# Patient Record
Sex: FEMALE | Race: BLACK OR AFRICAN AMERICAN | Employment: FULL TIME | URBAN - METROPOLITAN AREA
[De-identification: names, ages, dates, MRNs, and addresses within clinical notes are randomized per-mention and may not be internally consistent; named-entity substitution may affect disease eponyms.]

---

## 2017-01-02 ENCOUNTER — THERAPY VISIT (OUTPATIENT)
Dept: OCCUPATIONAL THERAPY | Facility: CLINIC | Age: 57
End: 2017-01-02
Payer: COMMERCIAL

## 2017-01-02 DIAGNOSIS — M65.90 SYNOVITIS AND TENOSYNOVITIS: ICD-10-CM

## 2017-01-02 DIAGNOSIS — M65.4 RADIAL STYLOID TENOSYNOVITIS: ICD-10-CM

## 2017-01-02 DIAGNOSIS — M25.542 PAIN IN THUMB JOINT WITH MOVEMENT OF LEFT HAND: Primary | ICD-10-CM

## 2017-01-02 PROCEDURE — 97140 MANUAL THERAPY 1/> REGIONS: CPT | Mod: GO | Performed by: OCCUPATIONAL THERAPIST

## 2017-01-02 PROCEDURE — 97165 OT EVAL LOW COMPLEX 30 MIN: CPT | Mod: GO | Performed by: OCCUPATIONAL THERAPIST

## 2017-01-02 PROCEDURE — 97110 THERAPEUTIC EXERCISES: CPT | Mod: GO | Performed by: OCCUPATIONAL THERAPIST

## 2017-01-02 NOTE — PROGRESS NOTES
Hand Therapy Initial Evaluation    Current Date:  1/2/2017    Diagnosis: L thumb DeQuervain's tendonitis  DOI: 12/28/16 MD order, started  Approximately 5 weeks 11/28/16    Subjective:  Roxie Vargas is a 56 year old right  hand dominant female.    Patient reports symptoms of pain of the left 1st  DC  which occurred due to unknown reasons . Since onset symptoms are Unchanged  Special tests: none .  Previous treatment: off shelf thumb spica (has been wearing 3 weeks now. Ibuprofen  General health as reported by patient is good. Pertinent medical history includes:auto immune hepatitis. Medical allergies:aspirin shrimp. Surgical history: none. Medication history: none.    Current occupation is   Currently working in normal job without restrictions  Job Tasks: computer work  Barriers include: none  Prior functional level: no limitations    Additional Occupational Profile Information (patterns of daily living, interests, values and needs): putting coat applying lotion to body, carry plate, putting seat belt on carry milk jug     Upper Extremity Functional Index Score:  SCORE:   Column Totals: /80: 66   (A lower score indicates greater disability.)    Objective:  Pain Level Report  VAS(0-10) 1/2/2017   At Rest: 0   With Use: 9   Report of Pain:  Location: L  wrist and thumb 1st DC   Pain Quality: Sharp  Frequency: intermittent    Pain is worst:  daytime  Exacerbated by: Use, Ibuprofen  Relieved by:  rest  Progression: Unchanged   ROM:Wrist UD measured with full composite thumb flexion  1/2/2017 Date: 1/2/2017 1/2/2017   Right  Side: Left    5 Wrist UD with Composite Thumb Flexion +10       Pain level report on scale of 0-10/10  Resisted Testing 1/2/2017   APL +9   EPB +9   RD +6   UD -   Wrist Ext -   Wrist Flex -     Strength:   (Measured in pounds)  Pain Report:  - none    + mild    ++ moderate    +++ severe     1/2/2017   Trials Right  Left    1  2  3 70  70  70 35  45  30   Average: 70 35      Lat Pinch  1/2/2017   Trials Right  Left    1  2  3 19  19  18 11  12  13   Average: 19 12     3 Pt Pinch  1/2/2017   Trials Right  Left    1  2  3 13  12  14 8  10  10   Average: 13 9     Pain Report:  - none    + mild    ++ moderate    +++ severe   Tenderness 1/2/2017   Radial Styloid +   SBRN -     Special Tests 1/2/2017   Finkelsteins R:  L:   Radial Nerve Tinel's -     Assessment:  Patient presents with symptoms consistent with diagnosis of DeQuervains tenosynovitis, with conservative intervention.   Patient's limitations or Problem List includes:  Pain, Decreased ROM/motion, Weakness, Decreased  and pinch strength and tightness in musculature of the left wrist and thumb which interferes with the patient's ability to perform Self Care Tasks (hygiene/washing hair ), Work Tasks, Household Chores and Driving  as compared to previous level of function.    Rehab Potential:  Excellent - Return to full activity, no limitations    Patient will benefit from skilled Occupational Therapy to increase wrist and thumb ROM, flexibility,  strength and pinch strength and decrease pain to return to previous activity level and resume normal daily tasks and to reach their rehab potential.    Barriers to Learning:  No barrier    Communication Issues:  Patient appears to be able to clearly communicate and understand verbal and written communication and follow directions correctly.    Assessment of Occupational Performance:  1-3 Performance Deficits  Identified Performance Deficits: bathing, household chores , driving and work      Clinical Decision Making (Complexity): Low complexity    Treatment Explanation:  The following has been discussed with the patient:    RX ordered/plan of care  Anticipated outcomes  Possible risks and side effects    Frequency:  1 X week, once daily  Duration:  for 4 weeks then has return appointment scheduled with Dr. Reno     Treatment Plan:    Modalities:    US   Therapeutic Exercise:    AROM of wrist and thumb  PROM with stretch to wrist and thumb extensors   Manual Techniques:   Re-alignment of the wrist in relation to radius (glide radially)  Interosseous membrane glide  Friction massage over 1st dorsal compartment  Myofascial release of the thumb extensors and flexors  Orthotic Fabrication:  Forearm based thumb spica orthosis  Neuromuscular: K tape     Home Program:   Exercise:   AROM of wrist and thumb, PROM with stretch into simultaneous thumb flexion and ulnar deviation  Gentle pain free wrist flex ext and RD, UD periodically during the day with brace off.   Orthosis:  Small off shelf for day and large off shelf for night   Forearm based thumb spica orthosis during the day and at night if needed. Will remove for exercise and hygiene.  Activity:   Avoid activities that exacerbate pain in the wrist or thumb.    Avoid  with twist, wide grasp.  Keep Thumb straight   Manual              FM and MFR 1st DC with lotion in am    Discharge Plan:    Achieve all LTG.  Independent in home treatment program.  Reach maximal therapeutic benefit.    Home Exercise Program:  FM and MFR 1st DC   Avoid using thumb in extension  Tendon gliding with PROM thumb flexion and wrist UD   Gentle pain free wrist flexion and extension     Next Visit:  Patient will bring work station pictures for activity review.   IF time allows introduce US K tape

## 2017-01-09 ENCOUNTER — THERAPY VISIT (OUTPATIENT)
Dept: OCCUPATIONAL THERAPY | Facility: CLINIC | Age: 57
End: 2017-01-09
Payer: COMMERCIAL

## 2017-01-09 DIAGNOSIS — M65.4 RADIAL STYLOID TENOSYNOVITIS: ICD-10-CM

## 2017-01-09 DIAGNOSIS — M25.542 PAIN IN THUMB JOINT WITH MOVEMENT OF LEFT HAND: Primary | ICD-10-CM

## 2017-01-09 DIAGNOSIS — M65.90 SYNOVITIS AND TENOSYNOVITIS: ICD-10-CM

## 2017-01-09 PROCEDURE — 97535 SELF CARE MNGMENT TRAINING: CPT | Mod: GO | Performed by: OCCUPATIONAL THERAPIST

## 2017-01-09 PROCEDURE — 97035 APP MDLTY 1+ULTRASOUND EA 15: CPT | Mod: GO | Performed by: OCCUPATIONAL THERAPIST

## 2017-01-09 PROCEDURE — 97140 MANUAL THERAPY 1/> REGIONS: CPT | Mod: GO | Performed by: OCCUPATIONAL THERAPIST

## 2017-01-09 NOTE — PROGRESS NOTES
SOAP note objective information for 1/9/2017.    Diagnosis: L thumb DeQuervain's tendonitis  DOI: 12/28/16 MD order, started  Approximately 5 weeks 11/28/16    Roxie Vargas is a 56 year old right  hand dominant female.Patient reports symptoms of pain of the left 1st DC which occurred due to unknown reasons. Since onset symptoms are Unchanged.  Special tests: none. Previous treatment: off shelf thumb spica (has been wearing 3 weeks now. Ibuprofen  General health as reported by patient is good. Pertinent medical history includes:auto immune hepatitis. Medical allergies:aspirin shrimp. Surgical history: none. Medication history: none.    Current occupation is   [employer].   Currently working in normal job without restrictions  Job Tasks: computer work    S:  Subjective changes as noted by patient: I brought my prefab night splint for you to see.   Functional changes noted by patient: Decreased Performance in pain, has had an increase in pain   Response to previous treatment: none   Patient has noted adverse reaction to: None      Objective:  Pain Level Report  VAS(0-10) 1/2/2017 1/9/17   At Rest: 0 3/10   With Use: 9 8/10   Report of Pain:  Location: L  wrist and thumb 1st DC   Pain Quality: Sharp  Frequency: intermittent    Pain is worst:  daytime  Exacerbated by: Use, Ibuprofen  Relieved by:  rest  Progression: Unchanged   ROM:Wrist UD measured with full composite thumb flexion  1/2/2017 Date: 1/2/2017 1/2/2017 1/9   Right  Side: Left  Left    5 Wrist UD with Composite Thumb Flexion +10 -10       Pain level report on scale of 0-10/10  Resisted Testing 1/2/2017   APL +9   EPB +9   RD +6   UD -   Wrist Ext -   Wrist Flex -     Strength:   (Measured in pounds)  Pain Report:  - none    + mild    ++ moderate    +++ severe     1/2/2017   Trials Right  Left    1  2  3 70  70  70 35  45  30   Average: 70 35     Lat Pinch  1/2/2017   Trials Right  Left    1  2  3 19  19  18 11  12  13    Average: 19 12     3 Pt Pinch  1/2/2017   Trials Right  Left    1  2  3 13  12  14 8  10  10   Average: 13 9     Pain Report:  - none    + mild    ++ moderate    +++ severe   Tenderness 1/2/2017   Radial Styloid +   SBRN -     Special Tests 1/2/2017   Finkelsteins R:  L:   Radial Nerve Tinel's -     A:  Response to therapy has been lack of progress in:  Pain:  intensity of pain has increased  Overall Assessment:  Patient would benefit from continued therapy to achieve rehab potential  STG/LTG:  See goal sheet for details and updates of remaining functional limitations.     Plan:  Frequency:  1 X week, once daily  Duration:  for 4 weeks then has return appointment scheduled with Dr. Reno       Treatment Plan:  Modalities:    US   Therapeutic Exercise:   AROM of wrist and thumb  PROM with stretch to wrist and thumb extensors   Manual Techniques:   Re-alignment of the wrist in relation to radius (glide radially)  Interosseous membrane glide  Friction massage over 1st dorsal compartment  Myofascial release of the thumb extensors and flexors  Orthotic Fabrication:  Forearm based thumb spica orthosis  Neuromuscular: K tape     Home Program:   Exercise:   AROM of wrist and thumb, PROM with stretch into simultaneous thumb flexion and ulnar deviation  Gentle pain free wrist flex ext and RD, UD periodically during the day with brace off.   Orthosis:  Small off shelf for day and large off shelf for night   Forearm based thumb spica orthosis during the day and at night if needed. Will remove for exercise and hygiene.  Activity:   Avoid activities that exacerbate pain in the wrist or thumb.    Avoid  with twist, wide grasp.  Keep Thumb straight   Manual              FM and MFR 1st DC with lotion in am    Home Exercise Program:  FM and MFR 1st DC   Avoid using thumb in extension  Tendon gliding with PROM thumb flexion and wrist UD   Small day time orthosis at work   Larger night time orthosis while sleeping   Gentle  pain free wrist flexion and extension   Work station, drop feet, get wrist rest, train self to rest between key strokes. Do not hold fingers up like flag poles between key strokes   Get arms level with keyboard, currently is on tray too low. Try getting keyboard up on desk next to monitor which seems more in line with elbows.     Next Visit:  Check implementation of work station recommendations.   US, MFR,tendon glide.  Start trial of K tape.

## 2017-01-16 ENCOUNTER — THERAPY VISIT (OUTPATIENT)
Dept: OCCUPATIONAL THERAPY | Facility: CLINIC | Age: 57
End: 2017-01-16
Payer: COMMERCIAL

## 2017-01-16 DIAGNOSIS — M65.4 RADIAL STYLOID TENOSYNOVITIS: ICD-10-CM

## 2017-01-16 DIAGNOSIS — M65.90 SYNOVITIS AND TENOSYNOVITIS: ICD-10-CM

## 2017-01-16 DIAGNOSIS — M25.542 PAIN IN THUMB JOINT WITH MOVEMENT OF LEFT HAND: Primary | ICD-10-CM

## 2017-01-16 PROCEDURE — 97035 APP MDLTY 1+ULTRASOUND EA 15: CPT | Mod: GO | Performed by: OCCUPATIONAL THERAPIST

## 2017-01-16 PROCEDURE — 97112 NEUROMUSCULAR REEDUCATION: CPT | Mod: GO | Performed by: OCCUPATIONAL THERAPIST

## 2017-01-16 PROCEDURE — 97140 MANUAL THERAPY 1/> REGIONS: CPT | Mod: GO | Performed by: OCCUPATIONAL THERAPIST

## 2017-01-16 NOTE — PROGRESS NOTES
SOAP note objective information for 1/16/2017.    Diagnosis: L thumb DeQuervain's tendonitis  DOI: 12/28/16 MD order, started  Approximately 5 weeks 11/28/16    Roxie Vargas is a 56 year old right  hand dominant female.Patient reports symptoms of pain of the left 1st DC which occurred due to unknown reasons. Since onset symptoms are Unchanged.  Special tests: none. Previous treatment: off shelf thumb spica (has been wearing 3 weeks now.),  Ibuprofen general health as reported by patient is good. Pertinent medical history includes:auto immune hepatitis. Medical allergies:aspirin shrimp. Surgical history: none. Medication history: none.    Current occupation is   [employer].   Currently working in normal job without restrictions  Job Tasks: computer work    O:  Pain Level Report  VAS(0-10) 1/2/2017 1/9/17 1/16/17   At Rest: 0 3/10 0/10   With Use: 9 8/10 7/10   Report of Pain:  Location: L  wrist and thumb 1st DC   Pain Quality: Sharp  Frequency: intermittent    Pain is worst:  daytime  Exacerbated by: Use, Ibuprofen  Relieved by:  rest  Progression: Unchanged   ROM:Wrist UD measured with full composite thumb flexion  1/2/2017 Date: 1/2/2017 1/2/2017 1/9 1/16   Right  Side: Left  Left     5 Wrist UD with Composite Thumb Flexion +10 -10 10       Pain level report on scale of 0-10/10  Resisted Testing 1/2/2017   APL +9   EPB +9   RD +6   UD -   Wrist Ext -   Wrist Flex -     Strength:   (Measured in pounds)  Pain Report:  - none    + mild    ++ moderate    +++ severe     1/2/2017   Trials Right  Left    1  2  3 70  70  70 35  45  30   Average: 70 35     Lat Pinch  1/2/2017   Trials Right  Left    1  2  3 19  19  18 11  12  13   Average: 19 12     3 Pt Pinch  1/2/2017   Trials Right  Left    1  2  3 13  12  14 8  10  10   Average: 13 9     Pain Report:  - none    + mild    ++ moderate    +++ severe   Tenderness 1/2/2017   Radial Styloid +   SBRN -     Special Tests 1/2/2017   Finkelsteins  R:  L:   Radial Nerve Tinel's -        Plan:  Frequency:  1 X week, once daily  Duration:  for 4 weeks then has return appointment scheduled with Dr. Reno       Treatment Plan:  Modalities:    US   Therapeutic Exercise:   AROM of wrist and thumb  PROM with stretch to wrist and thumb extensors   Manual Techniques:   Re-alignment of the wrist in relation to radius (glide radially)  Interosseous membrane glide  Friction massage over 1st dorsal compartment  Myofascial release of the thumb extensors and flexors  Orthotic Fabrication:  Forearm based thumb spica orthosis  Neuromuscular: K tape     Manual: 1st DC FM                Neuromuscular: Trial K tape    Home Exercise Program:  FM and MFR 1st DC   Avoid using thumb in extension  Tendon gliding with PROM thumb flexion and wrist UD   Small day time orthosis at work   Larger night time orthosis while sleeping   Gentle pain free wrist flexion and extension   Work station, drop feet, get wrist rest, train self to rest between key strokes. Do not hold fingers up like flag poles between key strokes   Get arms level with keyboard, currently is on tray too low. Try getting keyboard up on desk next to monitor which seems more in line with elbows. FM and MFR 1st DC with lotion in am  Self FM after shower     Next Visit:  Check response to K tape, implementation of work station recommendations.   US, MFR,tendon glide.

## 2017-03-06 DIAGNOSIS — D50.9 IRON DEFICIENCY ANEMIA, UNSPECIFIED IRON DEFICIENCY ANEMIA TYPE: Primary | ICD-10-CM

## 2017-06-19 DIAGNOSIS — K75.4 AUTOIMMUNE HEPATITIS (H): Primary | ICD-10-CM

## 2017-06-19 DIAGNOSIS — K75.4 AUTOIMMUNE HEPATITIS (H): ICD-10-CM

## 2017-06-19 LAB
ALBUMIN SERPL-MCNC: 3.6 G/DL (ref 3.4–5)
ALP SERPL-CCNC: 81 U/L (ref 40–150)
ALT SERPL W P-5'-P-CCNC: 16 U/L (ref 0–50)
ANION GAP SERPL CALCULATED.3IONS-SCNC: 6 MMOL/L (ref 3–14)
AST SERPL W P-5'-P-CCNC: 11 U/L (ref 0–45)
BILIRUB DIRECT SERPL-MCNC: <0.1 MG/DL (ref 0–0.2)
BILIRUB SERPL-MCNC: 0.3 MG/DL (ref 0.2–1.3)
BUN SERPL-MCNC: 11 MG/DL (ref 7–30)
CALCIUM SERPL-MCNC: 9.1 MG/DL (ref 8.5–10.1)
CHLORIDE SERPL-SCNC: 108 MMOL/L (ref 94–109)
CO2 SERPL-SCNC: 27 MMOL/L (ref 20–32)
CREAT SERPL-MCNC: 0.66 MG/DL (ref 0.52–1.04)
ERYTHROCYTE [DISTWIDTH] IN BLOOD BY AUTOMATED COUNT: 14.5 % (ref 10–15)
GFR SERPL CREATININE-BSD FRML MDRD: ABNORMAL ML/MIN/1.7M2
GLUCOSE SERPL-MCNC: 103 MG/DL (ref 70–99)
HCT VFR BLD AUTO: 36 % (ref 35–47)
HGB BLD-MCNC: 11.2 G/DL (ref 11.7–15.7)
INR PPP: 1.01 (ref 0.86–1.14)
MCH RBC QN AUTO: 28 PG (ref 26.5–33)
MCHC RBC AUTO-ENTMCNC: 31.1 G/DL (ref 31.5–36.5)
MCV RBC AUTO: 90 FL (ref 78–100)
PLATELET # BLD AUTO: 200 10E9/L (ref 150–450)
POTASSIUM SERPL-SCNC: 4.2 MMOL/L (ref 3.4–5.3)
PROT SERPL-MCNC: 7.9 G/DL (ref 6.8–8.8)
RBC # BLD AUTO: 4 10E12/L (ref 3.8–5.2)
SODIUM SERPL-SCNC: 141 MMOL/L (ref 133–144)
WBC # BLD AUTO: 6.3 10E9/L (ref 4–11)

## 2017-06-21 ENCOUNTER — OFFICE VISIT (OUTPATIENT)
Dept: GASTROENTEROLOGY | Facility: CLINIC | Age: 57
End: 2017-06-21
Attending: INTERNAL MEDICINE
Payer: COMMERCIAL

## 2017-06-21 VITALS
WEIGHT: 238.7 LBS | HEIGHT: 64 IN | RESPIRATION RATE: 16 BRPM | OXYGEN SATURATION: 99 % | HEART RATE: 88 BPM | BODY MASS INDEX: 40.75 KG/M2 | SYSTOLIC BLOOD PRESSURE: 119 MMHG | DIASTOLIC BLOOD PRESSURE: 81 MMHG | TEMPERATURE: 98 F

## 2017-06-21 DIAGNOSIS — K75.4 AUTOIMMUNE HEPATITIS (H): Primary | ICD-10-CM

## 2017-06-21 PROCEDURE — 99212 OFFICE O/P EST SF 10 MIN: CPT | Mod: ZF

## 2017-06-21 RX ORDER — CETIRIZINE HYDROCHLORIDE 10 MG/1
10 TABLET ORAL DAILY PRN
COMMUNITY
End: 2018-12-06

## 2017-06-21 ASSESSMENT — PAIN SCALES - GENERAL: PAINLEVEL: NO PAIN (0)

## 2017-06-21 NOTE — LETTER
6/21/2017      RE: Roxie Vagras  [id number] [facility] [id number]       CHIEF COMPLAINT:  Reason for the visit is autoimmune hepatitis.      HISTORY OF PRESENT ILLNESS:  Ms. Vargas is a 56-year-old [place] immigrant who I saw when she was admitted to the hospital on 12/2015 when she presented with very high liver function tests, fatigue and change urine color.  At that time, we did workup, and this included a liver biopsy.  Eventually, we found that she had chronic hepatitis which was consistent with autoimmune hepatitis.  She had some bridging fibrosis, and we started her on steroids and azathioprine.  The steroids were eventually discontinued, the azathioprine continued and she is tolerating it very good.  Her liver enzymes today were completely normal.  She does not have any signs of chronic liver disease including fluid retention, edema or abdominal distention.  Her weight has gone up which she attributes to her steroids, which she has discontinued since.  She has good appetite.  No nausea, vomiting or abdominal pain.  She is having good bowel movements.      Current Outpatient Prescriptions   Medication     cetirizine (ZYRTEC) 10 MG tablet     carboxymethylcellulose (CARBOXYMETHYLCELLULOSE SODIUM) 0.5 % SOLN ophthalmic solution     atovaquone-proguanil (MALARONE) 250-100 MG per tablet     azaTHIOprine (IMURAN) 50 MG tablet     Cholecalciferol (VITAMIN D PO)     ascorbic acid (VITAMIN C) 1000 MG TABS     ibuprofen (ADVIL,MOTRIN) 600 MG tablet     Ferrous Sulfate (IRON CR PO)     No current facility-administered medications for this visit.        PHYSICAL EXAMINATION:   VITAL SIGNS:  As of today, 06/21/2017, blood pressure is 119/81, temperature is 98 degrees Fahrenheit, pulse is 88, respirations 16, height is 1.626 meters, weight is 108.3 kg, BMI is 41.04.   HEENT:  Eyes are not icteric.  Mucosa moist.   NECK:  Supple, no lymphadenopathy.   CHEST:  Clear to auscultation.   ABDOMEN:  Not  distended, bowel sounds are present.  No hepatomegaly and no splenomegaly.   EXTREMITIES:  No edema, no clubbing.   CENTRAL NERVOUS SYSTEM:  Alert and oriented to place, person and time.  No asterixis noted.      IMPRESSION AND PLAN:  Autoimmune hepatitis.  Ms. Vargas has autoimmune hepatitis.  She is responding to the immunosuppression with azathioprine alone now.  She has achieved a clinical biochemical remission.  She will continue these, and we will talk with her about another liver biopsy to see if there is any regression of her fibrosis.  We did explain all that to her.  She will be coming here in 6 months, at which time if she continues to be having normal liver function tests then we will talk with her about the liver biopsy.  Otherwise, for her other medical issues, she will follow with her primary care physician.      This was a 25-minute visit of which more than 50% was spent in explaining to the patient what our plan of care was.  We answered all of her questions.      cc:   MD Wen Fuller MD

## 2017-06-21 NOTE — PROGRESS NOTES
CHIEF COMPLAINT:  Reason for the visit is autoimmune hepatitis.      HISTORY OF PRESENT ILLNESS:  Ms. Vargas is a 56-year-old [place] immigrant who I saw when she was admitted to the hospital on 12/2015 when she presented with very high liver function tests, fatigue and change urine color.  At that time, we did workup, and this included a liver biopsy.  Eventually, we found that she had chronic hepatitis which was consistent with autoimmune hepatitis.  She had some bridging fibrosis, and we started her on steroids and azathioprine.  The steroids were eventually discontinued, the azathioprine continued and she is tolerating it very good.  Her liver enzymes today were completely normal.  She does not have any signs of chronic liver disease including fluid retention, edema or abdominal distention.  Her weight has gone up which she attributes to her steroids, which she has discontinued since.  She has good appetite.  No nausea, vomiting or abdominal pain.  She is having good bowel movements.      Current Outpatient Prescriptions   Medication     cetirizine (ZYRTEC) 10 MG tablet     carboxymethylcellulose (CARBOXYMETHYLCELLULOSE SODIUM) 0.5 % SOLN ophthalmic solution     atovaquone-proguanil (MALARONE) 250-100 MG per tablet     azaTHIOprine (IMURAN) 50 MG tablet     Cholecalciferol (VITAMIN D PO)     ascorbic acid (VITAMIN C) 1000 MG TABS     ibuprofen (ADVIL,MOTRIN) 600 MG tablet     Ferrous Sulfate (IRON CR PO)     No current facility-administered medications for this visit.        PHYSICAL EXAMINATION:   VITAL SIGNS:  As of today, 06/21/2017, blood pressure is 119/81, temperature is 98 degrees Fahrenheit, pulse is 88, respirations 16, height is 1.626 meters, weight is 108.3 kg, BMI is 41.04.   HEENT:  Eyes are not icteric.  Mucosa moist.   NECK:  Supple, no lymphadenopathy.   CHEST:  Clear to auscultation.   ABDOMEN:  Not distended, bowel sounds are present.  No hepatomegaly and no splenomegaly.   EXTREMITIES:  No  edema, no clubbing.   CENTRAL NERVOUS SYSTEM:  Alert and oriented to place, person and time.  No asterixis noted.      IMPRESSION AND PLAN:  Autoimmune hepatitis.  Ms. Vargas has autoimmune hepatitis.  She is responding to the immunosuppression with azathioprine alone now.  She has achieved a clinical biochemical remission.  She will continue these, and we will talk with her about another liver biopsy to see if there is any regression of her fibrosis.  We did explain all that to her.  She will be coming here in 6 months, at which time if she continues to be having normal liver function tests then we will talk with her about the liver biopsy.  Otherwise, for her other medical issues, she will follow with her primary care physician.      This was a 25-minute visit of which more than 50% was spent in explaining to the patient what our plan of care was.  We answered all of her questions.      cc:   Tara Vargas MD

## 2017-06-21 NOTE — NURSING NOTE
"Chief Complaint   Patient presents with     RECHECK     autoimmune hepatitis       Initial /81 (BP Location: Left arm, Patient Position: Chair, Cuff Size: Adult Large)  Pulse 88  Temp 98  F (36.7  C) (Oral)  Resp 16  Ht 1.626 m (5' 4.02\")  Wt 108.3 kg (238 lb 11.2 oz)  SpO2 99%  BMI 40.95 kg/m2 Estimated body mass index is 40.95 kg/(m^2) as calculated from the following:    Height as of this encounter: 1.626 m (5' 4.02\").    Weight as of this encounter: 108.3 kg (238 lb 11.2 oz).  Medication Reconciliation: complete     [facility]  6/21/2017 11:01 AM        "

## 2017-06-21 NOTE — MR AVS SNAPSHOT
After Visit Summary   6/21/2017    Roxie Vargas    MRN: [id number]           Patient Information     Date Of Birth          1960        Visit Information        Provider Department      6/21/2017 10:50 AM Wen Neri MD [facility]        Today's Diagnoses     Autoimmune hepatitis (H)    -  1       Follow-ups after your visit        Follow-up notes from your care team     Return in about 1 year (around 6/21/2018).      Your next 10 appointments already scheduled     Canelo 15, 2018  5:00 PM CDT   LAB with  LAB   [facility] ([facility])    [address] [place] [id number] [phone]           Patient must bring picture ID.  Patient should be prepared to give a urine specimen  Please do not eat 10-12 hours before your appointment if you are coming in fasting for labs on lipids, cholesterol, or glucose (sugar).  Pregnant women should follow their Care Team instructions. Water with medications is okay. Do not drink coffee or other fluids.   If you have concerns about taking  your medications, please ask at office or if scheduling via saambaa, send a message by clicking on Secure Messaging, Message Your Care Team.            Jun 20, 2018  8:50 AM CDT   (Arrive by 8:35 AM)   Return General Liver with Wen Neri MD   [facility] ([facility])    [address] [place] [address] [phone]              Who to contact     If you have questions or need follow up information about today's clinic visit or your schedule please contact [facility] directly at [phone].  Normal or non-critical lab and imaging results will be communicated to you by MyChart, letter or phone within 4 business days after the clinic has received the results. If you do not hear from us within 7 days, please contact the clinic through MyChart or phone. If you  "have a critical or abnormal lab result, we will notify you by phone as soon as possible.  Submit refill requests through [employer] or call your pharmacy and they will forward the refill request to us. Please allow 3 business days for your refill to be completed.          Additional Information About Your Visit        SquareTradehart Information     [employer] gives you secure access to your electronic health record. If you see a primary care provider, you can also send messages to your care team and make appointments. If you have questions, please call your primary care clinic.  If you do not have a primary care provider, please call [phone] and they will assist you.        Care EveryWhere ID     This is your Care EveryWhere ID. This could be used by other organizations to access your [facility] medical records  [id number]        Your Vitals Were     Pulse Temperature Respirations Height Pulse Oximetry BMI (Body Mass Index)    88 98  F (36.7  C) (Oral) 16 1.626 m (5' 4.02\") 99% 40.95 kg/m2       Blood Pressure from Last 3 Encounters:   06/21/17 119/81   12/28/16 133/72   12/22/16 133/72    Weight from Last 3 Encounters:   06/21/17 108.3 kg (238 lb 11.2 oz)   12/28/16 106.1 kg (234 lb)   12/22/16 106.5 kg (234 lb 11.2 oz)              Today, you had the following     No orders found for display       Primary Care Provider Office Phone # Fax #    Marley Carlos Coronado [phone]       [facility] [address] [facility] [address]        Equal Access to Services     [facility]: Hadii aad ku hadasho Soomaali, waaxda luqadaha, qaybta kaalmada adegaurav, cara cheatham . So [facility] [phone].    ATENCIÓN: Si habla español, tiene a owod disposición servicios gratuitos de asistencia lingüística. Llame al [phone].    We comply with applicable federal civil rights laws and [place] laws. We do not discriminate on the basis of race, color, national origin, age, " disability sex, sexual orientation or gender identity.            Thank you!     Thank you for choosing [facility]  for your care. Our goal is always to provide you with excellent care. Hearing back from our patients is one way we can continue to improve our services. Please take a few minutes to complete the written survey that you may receive in the mail after your visit with us. Thank you!             Your Updated Medication List - Protect others around you: Learn how to safely use, store and throw away your medicines at www.disposemymeds.org.          This list is accurate as of: 6/21/17 11:59 PM.  Always use your most recent med list.                   Brand Name Dispense Instructions for use Diagnosis    ascorbic acid 1000 MG Tabs    vitamin C     Take 1,000 mg by mouth daily.        atovaquone-proguanil 250-100 MG per tablet    MALARONE    32 tablet    Take 1 tablet by mouth daily Start 2 days before travel and continue 7 days after return.    Preventive medication therapy needed, Counseling for travel       azaTHIOprine 50 MG tablet    IMURAN    30 tablet    Take 1 tablet (50 mg) by mouth daily    Autoimmune hepatitis (H)       carboxymethylcellulose 0.5 % Soln ophthalmic solution    carboxymethylcellulose sodium    1 Bottle    Place 1-2 drops into both eyes 3 times daily as needed for dry eyes    Episcleritis of both eyes       cetirizine 10 MG tablet    zyrTEC     Take 10 mg by mouth daily as needed for allergies        ibuprofen 600 MG tablet    ADVIL/MOTRIN     Take 600 mg by mouth every 6 hours as needed.        IRON CR PO      Take 1 tablet by mouth daily.        VITAMIN D PO      Take  by mouth daily.

## 2017-12-20 ENCOUNTER — OFFICE VISIT (OUTPATIENT)
Dept: OBGYN | Facility: CLINIC | Age: 57
End: 2017-12-20
Attending: ADVANCED PRACTICE MIDWIFE
Payer: COMMERCIAL

## 2017-12-20 VITALS
HEIGHT: 64 IN | BODY MASS INDEX: 41.85 KG/M2 | SYSTOLIC BLOOD PRESSURE: 135 MMHG | HEART RATE: 90 BPM | WEIGHT: 245.1 LBS | DIASTOLIC BLOOD PRESSURE: 79 MMHG

## 2017-12-20 DIAGNOSIS — Z12.4 SCREENING FOR MALIGNANT NEOPLASM OF CERVIX: ICD-10-CM

## 2017-12-20 DIAGNOSIS — Z01.419 ENCOUNTER FOR GYNECOLOGICAL EXAMINATION WITHOUT ABNORMAL FINDING: ICD-10-CM

## 2017-12-20 DIAGNOSIS — Z00.00 VISIT FOR PREVENTIVE HEALTH EXAMINATION: Primary | ICD-10-CM

## 2017-12-20 DIAGNOSIS — D25.9 UTERINE LEIOMYOMA, UNSPECIFIED LOCATION: ICD-10-CM

## 2017-12-20 PROCEDURE — G0145 SCR C/V CYTO,THINLAYER,RESCR: HCPCS | Performed by: ADVANCED PRACTICE MIDWIFE

## 2017-12-20 PROCEDURE — 99211 OFF/OP EST MAY X REQ PHY/QHP: CPT | Mod: ZF

## 2017-12-20 PROCEDURE — 87624 HPV HI-RISK TYP POOLED RSLT: CPT | Performed by: ADVANCED PRACTICE MIDWIFE

## 2017-12-20 ASSESSMENT — PAIN SCALES - GENERAL: PAINLEVEL: MILD PAIN (2)

## 2017-12-20 NOTE — NURSING NOTE
Annual exam   And low pelvic tightness  Feels a pulling sensation   Started three weeks ago-  Sensation comes and goes.  Rated a 2

## 2017-12-20 NOTE — MR AVS SNAPSHOT
After Visit Summary   12/20/2017    Roxie Vargas    MRN: [id number]           Patient Information     Date Of Birth          1960        Visit Information        Provider Department      12/20/2017 3:45 PM Ming Lorenzo CNM [facility]        Today's Diagnoses     Visit for preventive health examination    -  1    Screening for malignant neoplasm of cervix        Encounter for gynecological examination without abnormal finding        Uterine leiomyoma, unspecified location          Care Instructions      PREVENTIVE HEALTH RECOMMENDATIONS:   Most women need a yearly breast and pelvic exam.    A PAP screen, a test done DURING a pelvic exam, is NO longer recommended yearly.    March 2013, screening guidelines recommended by ACOG for PAP screen are:    1) First pap at age 21.    2) Pap every 3 years until age 30.    3) After age 30, pap every 3 years or Pap with HR HPV screen every 5 years until age 65.  4) Women do NOT need a vaginal Pap screen after a hysterectomy (surgical removal of the uterus) when they have not had cancer.    Exceptions:  1) Yearly pap if HIV+ or immunosuppressed secondary to organ transplant  2) AURA II-III continue routine screening for 20 years.    I encourage you continue looking for opportunities to choose a healthy lifestyle:       * Choose to eat a heart healthy diet. Check out the FOOD PLATE guidelines at: http://www.choosemyplate.gov/ for helpful hints on weight and cholesterol management.  Balance your caloric intake with exercise to maintain a BMI in the 22 to 26 range. For bone health: Eat calcium-rich foods like yogurt, broccoli or take chewable calcium pills (500 to 600 mg) twice a day with food.       * Exercise for at least an average of 30 minutes a day, 5 days of the week. This will help you control your weight, release stress, and help prevent disease.      * Take a Vitamin D3 supplement daily fall through spring and  during summer unless you aomh48-72' full body sun exposure to skin without sunscreen.      * DO wear sunscreen to prevent skin cancer after the first 15-30 minutes.      * Identify stressors in your life, find ways to release the stress, and, make time for yourself. PLEASE ask for help if mood changes last longer than two weeks.     * Limit alcohol to one drink per day.  No smoking.  Avoid second hand smoke. If you smoke, ask for help to stop.       *  If you are in a sexual relationship, talk with your partner about possible infection risks and take action to protect yourself from exposure to a sexual infection.    Please request an infection screen for STIs (sexually transmitted infections) if you are less than age 26 OR believe that you may be at risk.     Get a flu shot each year. Get a tetanus shot every 10 years. EVERYONE needs a pertussis (Whooping cough) booster.    See your dentist twice a year for an exam and preventive care cleaning.     Consider the following screen tests:    1) cholesterol test every 5 years.     2) yearly mammogram after age 40 unless you have identified risks.    3) colonoscopy every 10 years after age 50 unless you have identified risks.    4) diabetes blood test screening if you are at risk for diabetes.      Additional information that you may also find helpful:  The Internet now gives us access to LOTS of information -- some of it helpful, research documented and also plenty of harmful, anecdotal information that may not pertain to your situtaion. Consider visiting the following websites for accurate health information:    www.vitamindcouncil.org/ : Info and ongoing research re Vitamin D    www.fairview.org : Up to date and easily searchable information on multiple topics.    www.medlineplus.gov : medication info, interactive tutorials, watch real surgeries online    www.cdc.gov : public health info, travel advisories, epidemics (H1N1)    www.jodri/std.org: current research re  diagnosis, treatment and prevention of sexually contacted infections.    www.health.state.[place] : [place] dept of heatl, public health issues in [place], N1N1    www.familydoctor.org : good info from the Academy of Family Physicians                Follow-ups after your visit        Follow-up notes from your care team     Return in 1 year (on 12/20/2018) for Mammogram as soon as possible; Preventative Health Visit.      Your next 10 appointments already scheduled     Dec 26, 2017  1:00 PM CST   ULTRASOUND with [facility] ULTRASOUND   [facility] ([facility])    [facility] [address]  [address]  [facility] [id number] [phone]            Dec 28, 2017  2:30 PM CST   (Arrive by 2:15 PM)   MA SCREENING DIGITAL BILATERAL with URBCMA1   [facility] ([facility])    [address], [address]  [facility] [id number] [phone]           Do not use any powder, lotion or deodorant under your arms or on your breast. If you do, we will ask you to remove it before your exam.  Wear comfortable, two-piece clothing.  If you have any allergies, tell your care team.  Bring any previous mammograms from other facilities or have them mailed to the breast center.            Canelo 15, 2018  5:00 PM CDT   LAB with  LAB   [facility] ([facility])    [address]  1[address]  [facility] [id number] [phone]           Please do not eat 10-12 hours before your appointment if you are coming in fasting for labs on lipids, cholesterol, or glucose (sugar). This does not apply to pregnant women. Water, hot tea and black coffee (with nothing added) are okay. Do not drink other fluids, diet soda or chew gum.            Jun 20, 2018  8:50 AM CDT   (Arrive by 8:35 AM)   Return General Liver with Wen Neri MD   [facility] ([facility])    [address]  [address]  "[facility] [address] [phone]              Who to contact     Please call your clinic at [phone] to:    Ask questions about your health    Make or cancel appointments    Discuss your medicines    Learn about your test results    Speak to your doctor   If you have compliments or concerns about an experience at your clinic, or if you wish to file a complaint, please contact [facility] Patient Relations at [phone] or email us at [email or web address]         Additional Information About Your Visit        yoonewharAcunote Information     [employer] gives you secure access to your electronic health record. If you see a primary care provider, you can also send messages to your care team and make appointments. If you have questions, please call your primary care clinic.  If you do not have a primary care provider, please call [phone] and they will assist you.      [employer] is an electronic gateway that provides easy, online access to your medical records. With [employer], you can request a clinic appointment, read your test results, renew a prescription or communicate with your care team.     To access your existing account, please contact your [facility] or call [phone] for assistance.        Care EveryWhere ID     This is your Care EveryWhere ID. This could be used by other organizations to access your [facility] medical records  [id number]        Your Vitals Were     Pulse Height BMI (Body Mass Index)             90 1.631 m (5' 4.2\") 41.81 kg/m2          Blood Pressure from Last 3 Encounters:   12/20/17 135/79   06/21/17 119/81   12/28/16 133/72    Weight from Last 3 Encounters:   12/20/17 111.2 kg (245 lb 1.6 oz)   06/21/17 108.3 kg (238 lb 11.2 oz)   12/28/16 106.1 kg (234 lb)              We Performed the Following     HPV High Risk Types DNA Cervical     Pap imaged thin layer screen with HPV - recommended age 30 - 65 years " (select HPV order below)     Pap Smear Exam [] Do Not Remove     Pelvic and Breast Exam Procedure []        Primary Care Provider Office Phone # Fax #    Marley Carlos Coronado [phone]       [facility] [address]  [facility] [address]        Equal Access to Services     [facility] : Hadii aad ku hadasho Soomaali, waaxda luqadaha, qaybta kaalmada adeegyada, waxay mitain haychrissyn adeglenna vu labradn . So [facility] [phone].    ATENCIÓN: Si habla español, tiene a wood disposición servicios gratuitos de asistencia lingüística. Romero al [phone].    We comply with applicable federal civil rights laws and [place] laws. We do not discriminate on the basis of race, color, national origin, age, disability, sex, sexual orientation, or gender identity.            Thank you!     Thank you for choosing [facility]  for your care. Our goal is always to provide you with excellent care. Hearing back from our patients is one way we can continue to improve our services. Please take a few minutes to complete the written survey that you may receive in the mail after your visit with us. Thank you!             Your Updated Medication List - Protect others around you: Learn how to safely use, store and throw away your medicines at www.disposemymeds.org.          This list is accurate as of: 12/20/17 11:59 PM.  Always use your most recent med list.                   Brand Name Dispense Instructions for use Diagnosis    ascorbic acid 1000 MG Tabs    vitamin C     Take 1,000 mg by mouth daily.        atovaquone-proguanil 250-100 MG per tablet    MALARONE    32 tablet    Take 1 tablet by mouth daily Start 2 days before travel and continue 7 days after return.    Preventive medication therapy needed, Counseling for travel       azaTHIOprine 50 MG tablet    IMURAN    30 tablet    Take 1 tablet (50 mg) by mouth daily    Autoimmune hepatitis (H)       carboxymethylcellulose  0.5 % Soln ophthalmic solution    carboxymethylcellulose sodium    1 Bottle    Place 1-2 drops into both eyes 3 times daily as needed for dry eyes    Episcleritis of both eyes       cetirizine 10 MG tablet    zyrTEC     Take 10 mg by mouth daily as needed for allergies        ibuprofen 600 MG tablet    ADVIL/MOTRIN     Take 600 mg by mouth every 6 hours as needed.        IRON CR PO      Take 1 tablet by mouth daily.        VITAMIN D PO      Take  by mouth daily.

## 2017-12-20 NOTE — PATIENT INSTRUCTIONS

## 2017-12-20 NOTE — PROGRESS NOTES
"    Progress Note    SUBJECTIVE:  Roxie Vargas is an 57 year old, , who requests an Annual Preventive Exam.     Concerns today include: Feeling well overall.     Patient does note some lower abdominal cramping that has been coming and going. Describes as \"low pelvic tightening;\" unsure of how long she has noticed it. Not worsening. Denies any urinary or vaginal symptoms. Normal BM, no abdominal pain. Has had no vaginal bleeding since cessation of menses at age 43.  Has a hx of fibroids that were removed in . Had pelvic ultrasound in  and  that showed multiple fibroids. Pt would like a repeat pelvic ultrasound.    Not sexually active. Declines sti testing.    Followed closely by GI and IM d/t autoimmune hepatitis. Currently on azothioprine which is an immunosuppressive medication. Recommended yearly pap with hpv cotesting to which patient is agreeable. Last pap 16 NIL with negative HPV.     Last mammogram 16 normal with recommendation for yearly follow up. Patient plans to schedule in the next month. Denies any breast concerns, denies changes in skin color or texture, masses/lumps, pain or discharge.    Menstrual History:  Menstrual History 2017   Menarche age 16   Period Cycle (Days) post menapausal  46       Last    Lab Results   Component Value Date    PAP NIL 2016     History of abnormal Pap smear: No  Needs yearly pap with cotesting d/t immunosuppressive medication    Last   Lab Results   Component Value Date    HPV16 Negative 2016     Last   Lab Results   Component Value Date    HPV18 Negative 2016     Last   Lab Results   Component Value Date    HRHPV Negative 2016       Mammogram current: no (patient will schedule)  Last Mammogram:   Ma Screening Digital Bilateral    Result Date: 2015  Narrative: SCREENING MAMMOGRAM, BILATERAL, DIGITAL w/CAD - 2015 8:57 AM. HISTORY: No current breast complaints. COMPARISON:  11/3/2011, 3/13/2009, " 11/29/2008. BREAST DENSITY: Heterogeneously dense. COMMENTS: No significant change.             Last Colonoscopy:  Results for orders placed or performed during the hospital encounter of 04/01/13   COLONOSCOPY   Result Value Ref Range    COLONOSCOPY       [facility], [place]   [address] [place] [address] [phone]     Endoscopy Department  _______________________________________________________________________________  Patient Name: Roxie Vargas          Procedure Date: 4/1/2013 08:04:SS A4/P4     MRN: [id number]                       Account Number: [id number]                  YOB: 1960              Admit Type: Outpatient                      Age: 52                               [facility]                     Gender: Female                        Note Status: Finalized                      Attending MD: Tobias Merchant MD            Pause for the Cause: Pause for the cause   completed  _______________________________________________________________________________     Procedure:                Colonoscopy  Indications:              Screening for colorectal malignant neoplasm  Providers:                Tobias Merchant MD, Yoselyn Jalloh RN  Patient Profile:          52 year old  female with h/o anemia                             for [id number] colonoscopy  Referring MD:             Shannen King, NP  Medicines:                Fentanyl 100 micrograms IV, Midazolam 2 mg IV  Complications:            No immediate complications  _______________________________________________________________________________  Procedure:                Pre-Anesthesia Assessment:                            - Prior to the procedure, a History and Physical                             was performed, and patient medications and                             allergies were reviewed. The patient is competent.                             The risks and benefits of the  procedure and the                             sedation options and risks were discussed with the                             patient. All questions were answered and informed                             consent was obtained. Patient identification and                             proposed procedure were verified by the physician                             in the pre-procedure area. Mental Status                             Examination: alert and oriented. Airway                             Examination: normal oropharyngeal airway and neck                             mobility. Respiratory Examination: clear to                             auscultation. CV Examination: normal. Prophylactic                             Antibiotics: The patient does not require                             prophylactic antibiotics. Prior Anticoagulants: The                             patient has taken no previous anticoagulant or                             antiplatelet agents. ASA Grade Assessment: II - A                             patient with mild systemic disease. After reviewing                             the risks and benefits, the patient was deemed in                             satisfactory condition to undergo the procedure.                             The anesthesia plan was to use moderate sedation /                             analgesia (conscious sedation). Immediately prior                             to administration of medications, the patient was                             re-assessed for adequacy to receive sedatives. The                             heart rate, respiratory rate, oxygen saturations,                             blood pressure, adequacy of pulmonary ventilation,                             and response to care were monitored throughout the                             procedure. The physical status of the patient was                             re-assessed after the procedure.                             After obtaining informed consent, the colonoscope                             was passed under direct vision. Throughout the                             procedure, the patient's blood pressure, pulse, and                             oxygen saturations were monitored continuously. The                             Colonoscope was introduced through the anus and                             advanced to the cecum, identified by appendiceal                             orifice & ileocecal valve. The colonoscopy was                             performed without difficulty. The patient tolerated                             the procedure well. The quality of the bowel                             preparation was excellent.                                                                                   Findings:       The perianal and digital rectal examinations were normal. The colon        (entire examined portion) appeared normal. The retroflexed view of the        distal rectum was normal and showed no anal or rectal abnormalities.                                                                                   Impression:               - The entire examined colon is normal.  Recommendation:           - Discharge patient to home (with escort).                            - Repeat colonoscopy in 10 years for screening                             purposes.                            - Return to referring physician as previously                             scheduled.                                                                                     Signed electronically by Mendoza Merchant MD  _____________  Tobias Merchant MD  Signed Date: 4/1/2013 10:04:SS A4/P4  Number of Addenda: 0  I was physically present for the entire viewing portion of the exam.  __________________________  Signature of teaching physician  B4c/D4c  Note Initiated On: 4/1/2013 08:04:SS A4/P4  Scope Withdrawal Time: 0 hours 0 minutes 0 seconds   Scope Withdrawal  Time: 0 hours 0 minutes 0 seconds   Total Procedure Duration: 0 hours 0 minutes 0 seconds          HISTORY:    Current Outpatient Prescriptions on File Prior to Visit:  cetirizine (ZYRTEC) 10 MG tablet Take 10 mg by mouth daily as needed for allergies   carboxymethylcellulose (CARBOXYMETHYLCELLULOSE SODIUM) 0.5 % SOLN ophthalmic solution Place 1-2 drops into both eyes 3 times daily as needed for dry eyes   atovaquone-proguanil (MALARONE) 250-100 MG per tablet Take 1 tablet by mouth daily Start 2 days before travel and continue 7 days after return.   azaTHIOprine (IMURAN) 50 MG tablet Take 1 tablet (50 mg) by mouth daily   Cholecalciferol (VITAMIN D PO) Take  by mouth daily.   ascorbic acid (VITAMIN C) 1000 MG TABS Take 1,000 mg by mouth daily.   ibuprofen (ADVIL,MOTRIN) 600 MG tablet Take 600 mg by mouth every 6 hours as needed.   Ferrous Sulfate (IRON CR PO) Take 1 tablet by mouth daily.     No current facility-administered medications on file prior to visit.   Allergies   Allergen Reactions     Shrimp Anaphylaxis     Aspirin Other (See Comments)     Watery eyes. Happened a long time ago.      Immunization History   Administered Date(s) Administered     HEPA 2008, 2008     HepB 2008, 2008, 2008     Influenza Intranasal Vaccine 4 valent 10/05/2016     Mantoux Tuberculin Skin Test 2001     Meningococcal (Menactra ) 11/15/2016     Pneumo Conj 13-V (2010&after) 11/15/2016     TD (ADULT, 7+) 10/02/2000     TDAP Vaccine (Boostrix) 10/18/2011     Tdap (Adacel,Boostrix) 2008     Typhoid IM 11/15/2016       Obstetric History       T0      L1     SAB0   TAB1   Ectopic0   Multiple0   Live Births0      Past Medical History:   Diagnosis Date     Autoimmune hepatitis (H)      Iron deficiency anemia      Ulcer (H)      Past Surgical History:   Procedure Laterality Date     COLONOSCOPY  2013    Procedure: COLONOSCOPY;;  Surgeon: Tobias Merchant MD;  Location: [facility]      "uterine fibroids removed[  2005     wisdom teeth[       Family History   Problem Relation Age of Onset     Asthma Sister      Peptic Ulcer Disease Sister      and 2 nephews     Social History     Social History     Marital status: Single     Spouse name: N/A     Number of children: N/A     Years of education: N/A     Occupational History     Medical Records [employer]     Social History Main Topics     Smoking status: Never Smoker     Smokeless tobacco: Never Used     Alcohol use No     Drug use: No     Sexual activity: No     Other Topics Concern     None     Social History Narrative    Immigrated from [place] 1982.             How much exercise per week? Daily activities    How much calcium per day? supplements       How much caffeine per day? Tea 1     How much vitamin D per day? In supplements    Do you/your family wear seatbelts?  Yes    Do you/your family use safety helmets? Yes    Do you/your family use sunscreen? Yes    Do you/your family keep firearms in the home? No    Do you/your family have a smoke detector(s)? Yes            [facility]           ROS   ROS: 10 point ROS neg other than the symptoms noted above in the HPI.   No flowsheet data found.  No flowsheet data found.      EXAM:  Blood pressure 135/79, pulse 90, height 1.631 m (5' 4.2\"), weight 111.2 kg (245 lb 1.6 oz), not currently breastfeeding. Body mass index is 41.81 kg/(m^2).  General - pleasant female in no acute distress.  Skin - no suspicious lesions or rashes  EENT-  PERRLA, euthyroid with out palpable nodules  Neck - supple without lymphadenopathy.  Lungs - clear to auscultation bilaterally.  Heart - regular rate and rhythm without murmur.  Abdomen - soft, nontender, nondistended, no masses or organomegaly noted.  Musculoskeletal - no gross deformities.  Neurological - normal strength, sensation, and mental status.    Breast Exam:  Breast: Without visible skin changes. No dimpling or lesions seen.   " Breasts supple, non-tender with palpation, no dominant mass, nodularity, or nipple discharge noted bilaterally. Axillary nodes negative.      Pelvic Exam:  EG/BUS: Normal genital architecture without lesions, erythema or abnormal secretions Bartholin's, Urethra, Northridge's normal   Urethral meatus: normal without lesion  Urethra: no masses, tenderness, or scarring   Bladder: no masses or tenderness   Vagina: moist, pink, rugae with odorless and physiologic discharge  Secretions, mild atrophy noted  Cervix: extremely difficult to visualize cervix on spec exam, 2nd CNM to exam room and also unable to visualize. Additionally, unable to effectively palpate cervix on bimanual exam. Sample for pap obtained with assistance of colleague.   Uterus: exam findings compromised by body habitus, nontender  Adnexa: Not palpable secondary to body habitus, nontender  Rectum:anus normal       ASSESSMENT:  Encounter Diagnoses   Name Primary?     Visit for preventive health examination Yes     Screening for malignant neoplasm of cervix      Encounter for gynecological examination without abnormal finding      Uterine leiomyoma, unspecified location         PLAN:   Orders Placed This Encounter   Procedures     Pelvic and Breast Exam Procedure []     Pap Smear Exam [] Do Not Remove     US GYN Complete Transvaginal - 26575 (In Clinic)     Pap imaged thin layer screen with HPV - recommended age 30 - 65 years (select HPV order below)     HPV High Risk Types DNA Cervical       Additional teaching done at this visit regarding calcium (1200 mg per day), self breast exam, exercise, mental health and weight/diet.    - Consulted with Dr. Barth who recommended yearly pap with hpv cotesting d/t immunosuppressive medication.  - Pap with HPV cotesting collected today. Will follow up with results d/t difficulty of visualization and palpation of cervix.    - Recommended mammogram. Information for scheduling provided to patient.    - Pelvic  ultrasound ordered to reevaluate uterine fibroids and pelvic cramping.    - Colonoscopy due 2023.     - Pt interested in new internal med/pcp. Dr. Coronado and Dr. Costa's information given.    Return to clinic in one year for annual exam and pap.    Follow-up as needed.    SUZY Padilla, ALVERTOM

## 2017-12-20 NOTE — LETTER
"2017       RE: Roxie Vargas  [address] [facility] [address]     Dear Colleague,    Thank you for referring your patient, Roxie Vargas, to the [facility] at [facility]. Please see a copy of my visit note below.        Progress Note    SUBJECTIVE:  Roxie Vargas is an 57 year old, , who requests an Annual Preventive Exam.     Concerns today include: Feeling well overall.     Patient does note some lower abdominal cramping that has been coming and going. Describes as \"low pelvic tightening;\" unsure of how long she has noticed it. Not worsening. Denies any urinary or vaginal symptoms. Normal BM, no abdominal pain. Has had no vaginal bleeding since cessation of menses at age 43.  Has a hx of fibroids that were removed in . Had pelvic ultrasound in  and  that showed multiple fibroids. Pt would like a repeat pelvic ultrasound.    Not sexually active. Declines sti testing.    Followed closely by GI and IM d/t autoimmune hepatitis. Currently on azothioprine which is an immunosuppressive medication. Recommended yearly pap with hpv cotesting to which patient is agreeable. Last pap 16 NIL with negative HPV.     Last mammogram 16 normal with recommendation for yearly follow up. Patient plans to schedule in the next month. Denies any breast concerns, denies changes in skin color or texture, masses/lumps, pain or discharge.    Menstrual History:  Menstrual History 2017   Menarche age 16   Period Cycle (Days) post menapausal  46       Last    Lab Results   Component Value Date    PAP NIL 2016     History of abnormal Pap smear: No  Needs yearly pap with cotesting d/t immunosuppressive medication    Last   Lab Results   Component Value Date    HPV16 Negative 2016     Last   Lab Results   Component Value Date    HPV18 Negative 2016     Last   Lab Results   Component Value Date    HRHPV Negative " 12/22/2016       Mammogram current: no (patient will schedule)  Last Mammogram:   Ma Screening Digital Bilateral    Result Date: 11/20/2015  Narrative: SCREENING MAMMOGRAM, BILATERAL, DIGITAL w/CAD - 11/19/2015 8:57 AM. HISTORY: No current breast complaints. COMPARISON:  11/3/2011, 3/13/2009, 11/29/2008. BREAST DENSITY: Heterogeneously dense. COMMENTS: No significant change.             Last Colonoscopy:  Results for orders placed or performed during the hospital encounter of 04/01/13   COLONOSCOPY   Result Value Ref Range    COLONOSCOPY       [facility], [place]   [address] [place] [address] [phone]     Endoscopy Department  _______________________________________________________________________________  Patient Name: Roxie Vargas          Procedure Date: 4/1/2013 08:04:SS A4/P4     MRN: [id number]                       Account Number: [id number]                  YOB: 1960              Admit Type: Outpatient                      Age: 52                               [facility]                     Gender: Female                        Note Status: Finalized                      Attending MD: Tobias Merchant MD            Pause for the Cause: Pause for the cause   completed  _______________________________________________________________________________     Procedure:                Colonoscopy  Indications:              Screening for colorectal malignant neoplasm  Providers:                Tobias Merchant MD, Yoselyn Jalloh, RN  Patient Profile:          52 year old  female with h/o anemia                             for [id number] colonoscopy  Referring MD:             Shannen King, AUGUSTINE  Medicines:                Fentanyl 100 micrograms IV, Midazolam 2 mg IV  Complications:            No immediate complications  _______________________________________________________________________________  Procedure:                Pre-Anesthesia  Assessment:                            - Prior to the procedure, a History and Physical                             was performed, and patient medications and                             allergies were reviewed. The patient is competent.                             The risks and benefits of the procedure and the                             sedation options and risks were discussed with the                             patient. All questions were answered and informed                             consent was obtained. Patient identification and                             proposed procedure were verified by the physician                             in the pre-procedure area. Mental Status                             Examination: alert and oriented. Airway                             Examination: normal oropharyngeal airway and neck                             mobility. Respiratory Examination: clear to                             auscultation. CV Examination: normal. Prophylactic                             Antibiotics: The patient does not require                             prophylactic antibiotics. Prior Anticoagulants: The                             patient has taken no previous anticoagulant or                             antiplatelet agents. ASA Grade Assessment: II - A                             patient with mild systemic disease. After reviewing                             the risks and benefits, the patient was deemed in                             satisfactory condition to undergo the procedure.                             The anesthesia plan was to use moderate sedation /                             analgesia (conscious sedation). Immediately prior                             to administration of medications, the patient was                             re-assessed for adequacy to receive sedatives. The                             heart rate, respiratory rate, oxygen saturations,                              blood pressure, adequacy of pulmonary ventilation,                             and response to care were monitored throughout the                             procedure. The physical status of the patient was                             re-assessed after the procedure.                            After obtaining informed consent, the colonoscope                             was passed under direct vision. Throughout the                             procedure, the patient's blood pressure, pulse, and                             oxygen saturations were monitored continuously. The                             Colonoscope was introduced through the anus and                             advanced to the cecum, identified by appendiceal                             orifice & ileocecal valve. The colonoscopy was                             performed without difficulty. The patient tolerated                             the procedure well. The quality of the bowel                             preparation was excellent.                                                                                   Findings:       The perianal and digital rectal examinations were normal. The colon        (entire examined portion) appeared normal. The retroflexed view of the        distal rectum was normal and showed no anal or rectal abnormalities.                                                                                   Impression:               - The entire examined colon is normal.  Recommendation:           - Discharge patient to home (with escort).                            - Repeat colonoscopy in 10 years for screening                             purposes.                            - Return to referring physician as previously                             scheduled.                                                                                     Signed electronically by Mendoza Merchant MD  _____________  Tobias Merchant MD  Signed Date:  2013 10:04:SS A4/P4  Number of Addenda: 0  I was physically present for the entire viewing portion of the exam.  __________________________  Signature of teaching physician  [id number]  Note Initiated On: 2013 08:04:SS A4/P4  Scope Withdrawal Time: 0 hours 0 minutes 0 seconds   Scope Withdrawal Time: 0 hours 0 minutes 0 seconds   Total Procedure Duration: 0 hours 0 minutes 0 seconds          HISTORY:    Current Outpatient Prescriptions on File Prior to Visit:  cetirizine (ZYRTEC) 10 MG tablet Take 10 mg by mouth daily as needed for allergies   carboxymethylcellulose (CARBOXYMETHYLCELLULOSE SODIUM) 0.5 % SOLN ophthalmic solution Place 1-2 drops into both eyes 3 times daily as needed for dry eyes   atovaquone-proguanil (MALARONE) 250-100 MG per tablet Take 1 tablet by mouth daily Start 2 days before travel and continue 7 days after return.   azaTHIOprine (IMURAN) 50 MG tablet Take 1 tablet (50 mg) by mouth daily   Cholecalciferol (VITAMIN D PO) Take  by mouth daily.   ascorbic acid (VITAMIN C) 1000 MG TABS Take 1,000 mg by mouth daily.   ibuprofen (ADVIL,MOTRIN) 600 MG tablet Take 600 mg by mouth every 6 hours as needed.   Ferrous Sulfate (IRON CR PO) Take 1 tablet by mouth daily.     No current facility-administered medications on file prior to visit.   Allergies   Allergen Reactions     Shrimp Anaphylaxis     Aspirin Other (See Comments)     Watery eyes. Happened a long time ago.      Immunization History   Administered Date(s) Administered     HEPA 2008, 2008     HepB 2008, 2008, 2008     Influenza Intranasal Vaccine 4 valent 10/05/2016     Mantoux Tuberculin Skin Test 2001     Meningococcal (Menactra ) 11/15/2016     Pneumo Conj 13-V (2010&after) 11/15/2016     TD (ADULT, 7+) 10/02/2000     TDAP Vaccine (Boostrix) 10/18/2011     Tdap (Adacel,Boostrix) 2008     Typhoid IM 11/15/2016       Obstetric History       T0      L1     SAB0   TAB1   Ectopic0    "Multiple0   Live Births0      Past Medical History:   Diagnosis Date     Autoimmune hepatitis (H)      Iron deficiency anemia      Ulcer (H)      Past Surgical History:   Procedure Laterality Date     COLONOSCOPY  4/1/2013    Procedure: COLONOSCOPY;;  Surgeon: Tobias Merchant MD;  Location:  GI     uterine fibroids removed[  2005     wisdom teeth[       Family History   Problem Relation Age of Onset     Asthma Sister      Peptic Ulcer Disease Sister      and 2 nephews     Social History     Social History     Marital status: Single     Spouse name: N/A     Number of children: N/A     Years of education: N/A     Occupational History     Medical Records [facility]     Social History Main Topics     Smoking status: Never Smoker     Smokeless tobacco: Never Used     Alcohol use No     Drug use: No     Sexual activity: No     Other Topics Concern     None     Social History Narrative    Immigrated from [place] 1982.             How much exercise per week? Daily activities    How much calcium per day? supplements       How much caffeine per day? Tea 1     How much vitamin D per day? In supplements    Do you/your family wear seatbelts?  Yes    Do you/your family use safety helmets? Yes    Do you/your family use sunscreen? Yes    Do you/your family keep firearms in the home? No    Do you/your family have a smoke detector(s)? Yes            [facility]           ROS   ROS: 10 point ROS neg other than the symptoms noted above in the HPI.   No flowsheet data found.  No flowsheet data found.      EXAM:  Blood pressure 135/79, pulse 90, height 1.631 m (5' 4.2\"), weight 111.2 kg (245 lb 1.6 oz), not currently breastfeeding. Body mass index is 41.81 kg/(m^2).  General - pleasant female in no acute distress.  Skin - no suspicious lesions or rashes  EENT-  PERRLA, euthyroid with out palpable nodules  Neck - supple without lymphadenopathy.  Lungs - clear to auscultation bilaterally.  Heart - regular " rate and rhythm without murmur.  Abdomen - soft, nontender, nondistended, no masses or organomegaly noted.  Musculoskeletal - no gross deformities.  Neurological - normal strength, sensation, and mental status.    Breast Exam:  Breast: Without visible skin changes. No dimpling or lesions seen.   Breasts supple, non-tender with palpation, no dominant mass, nodularity, or nipple discharge noted bilaterally. Axillary nodes negative.      Pelvic Exam:  EG/BUS: Normal genital architecture without lesions, erythema or abnormal secretions Bartholin's, Urethra, Roodhouse's normal   Urethral meatus: normal without lesion  Urethra: no masses, tenderness, or scarring   Bladder: no masses or tenderness   Vagina: moist, pink, rugae with odorless and physiologic discharge  Secretions, mild atrophy noted  Cervix: extremely difficult to visualize cervix on spec exam, 2nd CNM to exam room and also unable to visualize. Additionally, unable to effectively palpate cervix on bimanual exam. Sample for pap obtained with assistance of colleague.   Uterus: exam findings compromised by body habitus, nontender  Adnexa: Not palpable secondary to body habitus, nontender  Rectum:anus normal       ASSESSMENT:  Encounter Diagnoses   Name Primary?     Visit for preventive health examination Yes     Screening for malignant neoplasm of cervix      Encounter for gynecological examination without abnormal finding      Uterine leiomyoma, unspecified location         PLAN:   Orders Placed This Encounter   Procedures     Pelvic and Breast Exam Procedure []     Pap Smear Exam [] Do Not Remove     US GYN Complete Transvaginal - 43496 (In Clinic)     Pap imaged thin layer screen with HPV - recommended age 30 - 65 years (select HPV order below)     HPV High Risk Types DNA Cervical       Additional teaching done at this visit regarding calcium (1200 mg per day), self breast exam, exercise, mental health and weight/diet.    - Consulted with Dr. Barth  who recommended yearly pap with hpv cotesting d/t immunosuppressive medication.  - Pap with HPV cotesting collected today. Will follow up with results d/t difficulty of visualization and palpation of cervix.    - Recommended mammogram. Information for scheduling provided to patient.    - Pelvic ultrasound ordered to reevaluate uterine fibroids and pelvic cramping.    - Colonoscopy due 2023.     - Pt interested in new internal med/pcp. Dr. Coronado and Dr. Costa's information given.    Return to clinic in one year for annual exam and pap.    Follow-up as needed.    SUZY Padilla, CNM

## 2017-12-26 ENCOUNTER — OFFICE VISIT (OUTPATIENT)
Dept: OBGYN | Facility: CLINIC | Age: 57
End: 2017-12-26
Attending: ADVANCED PRACTICE MIDWIFE
Payer: COMMERCIAL

## 2017-12-26 DIAGNOSIS — D25.9 UTERINE LEIOMYOMA, UNSPECIFIED LOCATION: ICD-10-CM

## 2017-12-26 PROCEDURE — 76830 TRANSVAGINAL US NON-OB: CPT | Mod: ZF

## 2017-12-26 NOTE — LETTER
12/26/2017       RE: Roxie Vargas  [address] [facility] [address]     Dear Colleague,    Thank you for referring your patient, Roxie Vargas, to the [facility] at [facility]. Please see a copy of my visit note below.    57 year old female presents for gynecologic ultrasound indicated by h/o fibroids, lower pubic pain and cramping.  This study was done transvaginally.    Uterine findings:   Presence: Visible Size: Normal 4.8 x 6.0 x 4.3 cm.  Endometrium = 8.5 mm. Thickened, fluid visualized. Difficult to appreciate due to displacement by myoma(s).    Cx length = 32.5 mm.      Flexion:  Anteverted Position: Midline Margins: Abnormal Shape: Abnormal   Contour: Regular Texture: Homogeneous Cavity: Abnormal Masses: Abnormal   Multiple myomas throughout.  The three largest:  1). Anterior right: 1.9 x 1.8 x 1.9cm  2). Posterior left: 3.3 x 2.9 x 2.8cm  3). Right mid: 3.6 x 3.3 x 3.0cm    Pelvic findings:    Right Adnexa: Normal   Left Adnexa: Normal   Bladder:  Normal         Cul - de - sac fluid: None    Ovarian follicles:   Right ovary:  1.5 x 1.6 x 1.8cm.     0 follicles     Left ovary:  1.4 x 1.8 x 2.2cm.     0 follicles      Comments:  Endometrium significantly displaced making interpretation of stripe difficult. Multiple myomata.     CRISELDA Hong

## 2017-12-26 NOTE — PROGRESS NOTES
57 year old female presents for gynecologic ultrasound indicated by h/o fibroids, lower pubic pain and cramping.  This study was done transvaginally.    Uterine findings:   Presence: Visible Size: Normal 4.8 x 6.0 x 4.3 cm.  Endometrium = 8.5 mm. Thickened, fluid visualized. Difficult to appreciate due to displacement by myoma(s).    Cx length = 32.5 mm.      Flexion:  Anteverted Position: Midline Margins: Abnormal Shape: Abnormal   Contour: Regular Texture: Homogeneous Cavity: Abnormal Masses: Abnormal   Multiple myomas throughout.  The three largest:  1). Anterior right: 1.9 x 1.8 x 1.9cm  2). Posterior left: 3.3 x 2.9 x 2.8cm  3). Right mid: 3.6 x 3.3 x 3.0cm    Pelvic findings:    Right Adnexa: Normal   Left Adnexa: Normal   Bladder:  Normal         Cul - de - sac fluid: None    Ovarian follicles:   Right ovary:  1.5 x 1.6 x 1.8cm.     0 follicles     Left ovary:  1.4 x 1.8 x 2.2cm.     0 follicles      Comments:  Endometrium significantly displaced making interpretation of stripe difficult. Multiple myomata.     Shefali Nova, CRISELDA Salgado

## 2017-12-26 NOTE — MR AVS SNAPSHOT
After Visit Summary   12/26/2017    Roxie Vargas    MRN: [id number]           Patient Information     Date Of Birth          1960        Visit Information        Provider Department      12/26/2017 1:00 PM [facility] ULTRASOUND [facility]        Today's Diagnoses     Uterine leiomyoma, unspecified location           Follow-ups after your visit        Your next 10 appointments already scheduled     Dec 28, 2017  2:30 PM CST   (Arrive by 2:15 PM)   MA SCREENING DIGITAL BILATERAL with URBCMA1   [facility] ([facility])    [address] [place] [address], [address]  [facility] [id number] [phone]           Do not use any powder, lotion or deodorant under your arms or on your breast. If you do, we will ask you to remove it before your exam.  Wear comfortable, two-piece clothing.  If you have any allergies, tell your care team.  Bring any previous mammograms from other facilities or have them mailed to the breast center.            Canelo 15, 2018  5:00 PM CDT   LAB with [facility] ([facility])    [address] [place] [address]  [address] [facility] [address] [phone]           Please do not eat 10-12 hours before your appointment if you are coming in fasting for labs on lipids, cholesterol, or glucose (sugar). This does not apply to pregnant women. Water, hot tea and black coffee (with nothing added) are okay. Do not drink other fluids, diet soda or chew gum.            Jun 20, 2018  8:50 AM CDT   (Arrive by 8:35 AM)   Return General Liver with Wen Neri MD   [facility] ([facility])    [address] [place] [address]  3rd Floor  [facility] [address] [phone]              Future tests that were ordered for you today     Open Future Orders        Priority Expected Expires Ordered    MA Screening Digital Bilateral Routine  12/26/2018 12/26/2017            Who to contact     Please  call your clinic at [phone] to:    Ask questions about your health    Make or cancel appointments    Discuss your medicines    Learn about your test results    Speak to your doctor   If you have compliments or concerns about an experience at your clinic, or if you wish to file a complaint, please contact [facility] Patient Relations at [phone] or email us at [email or web address]         Additional Information About Your Visit        MyChart Information     popexpertt gives you secure access to your electronic health record. If you see a primary care provider, you can also send messages to your care team and make appointments. If you have questions, please call your primary care clinic.  If you do not have a primary care provider, please call [phone] and they will assist you.      [employer] is an electronic gateway that provides easy, online access to your medical records. With [employer], you can request a clinic appointment, read your test results, renew a prescription or communicate with your care team.     To access your existing account, please contact your [facility] or call [phone] for assistance.        Care EveryWhere ID     This is your Care EveryWhere ID. This could be used by other organizations to access your [facility] medical records  [id number]         Blood Pressure from Last 3 Encounters:   12/20/17 135/79   06/21/17 119/81   12/28/16 133/72    Weight from Last 3 Encounters:   12/20/17 111.2 kg (245 lb 1.6 oz)   06/21/17 108.3 kg (238 lb 11.2 oz)   12/28/16 106.1 kg (234 lb)              We Performed the Following     US GYN Complete Transvaginal - 71794 (In Clinic)        Primary Care Provider Office Phone # Fax #    Marley Coronado [phone]       [facility] [address] [place] [id number]        Equal Access to Services     CALI SINGH AH: Roxy Rucker  waheribertoda luqadaha, qaybta kaalmorgan sarabia, cara carrollsydney ah. So [facility] [phone].    ATENCIÓN: Si franny kan, tiene a wood disposición servicios gratuitos de asistencia lingüística. Romero al [phone].    We comply with applicable federal civil rights laws and [place] laws. We do not discriminate on the basis of race, color, national origin, age, disability, sex, sexual orientation, or gender identity.            Thank you!     Thank you for choosing [facility]  for your care. Our goal is always to provide you with excellent care. Hearing back from our patients is one way we can continue to improve our services. Please take a few minutes to complete the written survey that you may receive in the mail after your visit with us. Thank you!             Your Updated Medication List - Protect others around you: Learn how to safely use, store and throw away your medicines at www.disposemymeds.org.          This list is accurate as of: 12/26/17 11:59 PM.  Always use your most recent med list.                   Brand Name Dispense Instructions for use Diagnosis    ascorbic acid 1000 MG Tabs    vitamin C     Take 1,000 mg by mouth daily.        atovaquone-proguanil 250-100 MG per tablet    MALARONE    32 tablet    Take 1 tablet by mouth daily Start 2 days before travel and continue 7 days after return.    Preventive medication therapy needed, Counseling for travel       azaTHIOprine 50 MG tablet    IMURAN    30 tablet    Take 1 tablet (50 mg) by mouth daily    Autoimmune hepatitis (H)       carboxymethylcellulose 0.5 % Soln ophthalmic solution    carboxymethylcellulose sodium    1 Bottle    Place 1-2 drops into both eyes 3 times daily as needed for dry eyes    Episcleritis of both eyes       cetirizine 10 MG tablet    zyrTEC     Take 10 mg by mouth daily as needed for allergies        ibuprofen 600 MG tablet    ADVIL/MOTRIN     Take 600 mg by mouth every 6 hours as  needed.        IRON CR PO      Take 1 tablet by mouth daily.        VITAMIN D PO      Take  by mouth daily.

## 2017-12-27 LAB
COPATH REPORT: NORMAL
PAP: NORMAL

## 2017-12-28 ENCOUNTER — RADIANT APPOINTMENT (OUTPATIENT)
Dept: MAMMOGRAPHY | Facility: CLINIC | Age: 57
End: 2017-12-28
Attending: ADVANCED PRACTICE MIDWIFE
Payer: COMMERCIAL

## 2017-12-28 DIAGNOSIS — Z12.31 VISIT FOR SCREENING MAMMOGRAM: ICD-10-CM

## 2017-12-28 PROCEDURE — G0202 SCR MAMMO BI INCL CAD: HCPCS

## 2017-12-29 LAB
FINAL DIAGNOSIS: NORMAL
HPV HR 12 DNA CVX QL NAA+PROBE: NEGATIVE
HPV16 DNA SPEC QL NAA+PROBE: NEGATIVE
HPV18 DNA SPEC QL NAA+PROBE: NEGATIVE
SPECIMEN DESCRIPTION: NORMAL

## 2018-05-21 DIAGNOSIS — K75.4 AUTOIMMUNE HEPATITIS (H): Primary | ICD-10-CM

## 2018-06-05 DIAGNOSIS — K75.4 AUTOIMMUNE HEPATITIS (H): ICD-10-CM

## 2018-06-05 LAB
ALBUMIN SERPL-MCNC: 3.6 G/DL (ref 3.4–5)
ALP SERPL-CCNC: 82 U/L (ref 40–150)
ALT SERPL W P-5'-P-CCNC: 10 U/L (ref 0–50)
ANION GAP SERPL CALCULATED.3IONS-SCNC: 9 MMOL/L (ref 3–14)
AST SERPL W P-5'-P-CCNC: 8 U/L (ref 0–45)
BILIRUB DIRECT SERPL-MCNC: <0.1 MG/DL (ref 0–0.2)
BILIRUB SERPL-MCNC: 0.3 MG/DL (ref 0.2–1.3)
BUN SERPL-MCNC: 7 MG/DL (ref 7–30)
CALCIUM SERPL-MCNC: 9.2 MG/DL (ref 8.5–10.1)
CHLORIDE SERPL-SCNC: 108 MMOL/L (ref 94–109)
CO2 SERPL-SCNC: 26 MMOL/L (ref 20–32)
CREAT SERPL-MCNC: 0.59 MG/DL (ref 0.52–1.04)
ERYTHROCYTE [DISTWIDTH] IN BLOOD BY AUTOMATED COUNT: 14.4 % (ref 10–15)
GFR SERPL CREATININE-BSD FRML MDRD: >90 ML/MIN/1.7M2
GLUCOSE SERPL-MCNC: 87 MG/DL (ref 70–99)
HCT VFR BLD AUTO: 36.3 % (ref 35–47)
HGB BLD-MCNC: 11.6 G/DL (ref 11.7–15.7)
INR PPP: 1.09 (ref 0.86–1.14)
MCH RBC QN AUTO: 27.3 PG (ref 26.5–33)
MCHC RBC AUTO-ENTMCNC: 32 G/DL (ref 31.5–36.5)
MCV RBC AUTO: 85 FL (ref 78–100)
PLATELET # BLD AUTO: 204 10E9/L (ref 150–450)
POTASSIUM SERPL-SCNC: 3.9 MMOL/L (ref 3.4–5.3)
PROT SERPL-MCNC: 8.2 G/DL (ref 6.8–8.8)
RBC # BLD AUTO: 4.25 10E12/L (ref 3.8–5.2)
SODIUM SERPL-SCNC: 143 MMOL/L (ref 133–144)
WBC # BLD AUTO: 6.8 10E9/L (ref 4–11)

## 2018-06-05 PROCEDURE — 80076 HEPATIC FUNCTION PANEL: CPT | Performed by: PHYSICIAN ASSISTANT

## 2018-06-05 PROCEDURE — 80048 BASIC METABOLIC PNL TOTAL CA: CPT | Performed by: PHYSICIAN ASSISTANT

## 2018-06-05 PROCEDURE — 36415 COLL VENOUS BLD VENIPUNCTURE: CPT | Performed by: PHYSICIAN ASSISTANT

## 2018-06-05 PROCEDURE — 85027 COMPLETE CBC AUTOMATED: CPT | Performed by: PHYSICIAN ASSISTANT

## 2018-06-05 PROCEDURE — 85610 PROTHROMBIN TIME: CPT | Performed by: PHYSICIAN ASSISTANT

## 2018-06-07 ENCOUNTER — OFFICE VISIT (OUTPATIENT)
Dept: GASTROENTEROLOGY | Facility: CLINIC | Age: 58
End: 2018-06-07
Attending: PHYSICIAN ASSISTANT
Payer: COMMERCIAL

## 2018-06-07 VITALS
TEMPERATURE: 98.2 F | HEIGHT: 64 IN | DIASTOLIC BLOOD PRESSURE: 84 MMHG | WEIGHT: 243.4 LBS | OXYGEN SATURATION: 98 % | HEART RATE: 71 BPM | SYSTOLIC BLOOD PRESSURE: 141 MMHG | BODY MASS INDEX: 41.55 KG/M2

## 2018-06-07 DIAGNOSIS — K75.4 AUTOIMMUNE HEPATITIS (H): ICD-10-CM

## 2018-06-07 PROCEDURE — G0463 HOSPITAL OUTPT CLINIC VISIT: HCPCS | Mod: ZF

## 2018-06-07 PROCEDURE — 91200 LIVER ELASTOGRAPHY: CPT | Mod: ZF

## 2018-06-07 RX ORDER — AZATHIOPRINE 50 MG/1
50 TABLET ORAL DAILY
Qty: 30 TABLET | Refills: 5 | Status: SHIPPED | OUTPATIENT
Start: 2018-06-07 | End: 2019-06-07

## 2018-06-07 ASSESSMENT — PAIN SCALES - GENERAL: PAINLEVEL: NO PAIN (0)

## 2018-06-07 NOTE — PROGRESS NOTES
Hepatology Clinic note  Roxie Vargas   Date of Birth 1960  Date of Service 2018         Assessment/plan:   Roxie Vargas is a 57 year old female with autoimmune hepatitis, maintained on 50 mg of azathioprine.  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  Her transaminases have been normal for the past 2 years and she has been very compliant with medications.  She may have some improvement in her fibrosis. We discussed re-evaluating with a non-invasive fibrosis scan today.  No biochemical or clinical signs of cirrhosis.  We did talk about the importance of maintaining a healthy weight including good nutrition, emphasizing moderation carbohydrates and regular exercise for continued liver health.     -Fibrosis scan today to reevaluate fibrosis  -Continue 50 mg of azathioprine daily  -Follow-up in clinic in 6 months with me or Dr. Daron Sanchez PA-C   [facility] Hepatology clinic    -----------------------------------------------------       HPI:   Roxie Vargas is a 57 year old Lebanese female with Autoimmune Hepatitis presenting for the follow-up.    Autoimmune Hepatitis   -Diagnosed: 2015  -Prior biopsy: 2106, bridging fibrosis   -Prior treatments: Prednisone for induction at diagnosis, Azothioprine   Previous IG on 2016    Patient last saw Dr. Neri on 2017.  Patient has been maintained on 50 mg of azathioprine for the past 2 years.  Her transaminases have been normal since 2016.  Patient has no particular complaints or concerns today.  She states she takes her medications regularly and has had no problems with refills of her medications.  She denies any recent hospitalizations or ER visits.  No new medications.  She does state that she has gained some weight recently, likely due to decreased exercise.  She states that she does have some lower extremity swelling that comes and goes throughout the day on occasion.  She has  "regular bowel movements. She has a history of anemia and she states her PCP is investigating the cause. She states that she does take her iron supplement every day.     Patient denies jaundice, abdominal distension or confusion.  Patient also denies melena, hematochezia or hematemesis. Patient denies  fevers, sweats or chills.      She works at the [employer].  She does not drink alcohol.    Medical hx Surgical hx   Past Medical History:   Diagnosis Date     Autoimmune hepatitis (H)      Fibroids      Iron deficiency anemia      Ulcer (H)     Past Surgical History:   Procedure Laterality Date     COLONOSCOPY  4/1/2013    Procedure: COLONOSCOPY;;  Surgeon: Tobias Merchant MD;  Location:  GI     dilation & curretage         uterine fibroids removed[  2005     wisdom teeth[                   Medications:     Current Outpatient Prescriptions   Medication     ascorbic acid (VITAMIN C) 1000 MG TABS     atovaquone-proguanil (MALARONE) 250-100 MG per tablet     azaTHIOprine (IMURAN) 50 MG tablet     carboxymethylcellulose (CARBOXYMETHYLCELLULOSE SODIUM) 0.5 % SOLN ophthalmic solution     cetirizine (ZYRTEC) 10 MG tablet     Cholecalciferol (VITAMIN D PO)     Ferrous Sulfate (IRON CR PO)     ibuprofen (ADVIL,MOTRIN) 600 MG tablet     No current facility-administered medications for this visit.             Allergies:     Allergies   Allergen Reactions     Shrimp Anaphylaxis     Aspirin Other (See Comments)     Watery eyes. Happened a long time ago.             Review of Systems:   10 points ROS was obtained and highlighted in the HPI, otherwise negative.          Physical Exam:   VS:  /84  Pulse 71  Temp 98.2  F (36.8  C) (Oral)  Ht 1.631 m (5' 4.2\")  Wt 110.4 kg (243 lb 6.4 oz)  SpO2 98%  BMI 41.52 kg/m2      Gen- well, NAD, A+Ox3, normal color  Lym- no palpable LAD  CVS- RRR  RS- CTA  Abd- central obesity, soft, nontender. No shifting dullness  Extr- hands normal, no LINDA  Skin- no rash or " jaundice  Neuro- no asterixis  Psych- normal mood         Data:   Reviewed in person and significant for:    Lab Results   Component Value Date     06/05/2018      Lab Results   Component Value Date    POTASSIUM 3.9 06/05/2018     Lab Results   Component Value Date    CHLORIDE 108 06/05/2018     Lab Results   Component Value Date    CO2 26 06/05/2018     Lab Results   Component Value Date    BUN 7 06/05/2018     Lab Results   Component Value Date    CR 0.59 06/05/2018       Lab Results   Component Value Date    WBC 6.8 06/05/2018     Lab Results   Component Value Date    HGB 11.6 06/05/2018     Lab Results   Component Value Date    HCT 36.3 06/05/2018     Lab Results   Component Value Date    MCV 85 06/05/2018     Lab Results   Component Value Date     06/05/2018       Lab Results   Component Value Date    AST 8 06/05/2018     Lab Results   Component Value Date    ALT 10 06/05/2018     No results found for: BILICONJ   Lab Results   Component Value Date    BILITOTAL 0.3 06/05/2018       Lab Results   Component Value Date    ALBUMIN 3.6 06/05/2018     Lab Results   Component Value Date    PROTTOTAL 8.2 06/05/2018      Lab Results   Component Value Date    ALKPHOS 82 06/05/2018       Lab Results   Component Value Date    INR 1.09 06/05/2018

## 2018-06-07 NOTE — PATIENT INSTRUCTIONS
Continue 50 mg of azathioprine every day  Focus on good nutrition, monitoring carbohydrates, and getting regular exercise

## 2018-06-07 NOTE — MR AVS SNAPSHOT
After Visit Summary   6/7/2018    Roxie Vargas    MRN: [id number]           Patient Information     Date Of Birth          1960        Visit Information        Provider Department      6/7/2018 2:30 PM Ridge Sanchez PA-C [facility] Hepatology        Today's Diagnoses     Autoimmune hepatitis (H)          Care Instructions    Continue 50 mg of azathioprine every day  Focus on good nutrition, monitoring carbohydrates, and getting regular exercise          Follow-ups after your visit        Follow-up notes from your care team     Return in about 6 months (around 12/7/2018).      Your next 10 appointments already scheduled     Canelo 15, 2018  5:00 PM CDT   LAB with  LAB   [facility] ([facility])    [address]  1st Floor  [facility] [address] [phone]           Please do not eat 10-12 hours before your appointment if you are coming in fasting for labs on lipids, cholesterol, or glucose (sugar). This does not apply to pregnant women. Water, hot tea and black coffee (with nothing added) are okay. Do not drink other fluids, diet soda or chew gum.            Dec 06, 2018  2:30 PM CST   (Arrive by 2:15 PM)   Return General Liver with Ridge Sanchez PA-C   [facility] Hepatology ([facility])    [address] [place] [address]  [address] [place] [address] [phone]              Future tests that were ordered for you today     Open Future Orders        Priority Expected Expires Ordered    Fibrosis Scan (In-Clinic) Routine 6/7/2018 6/7/2019 6/7/2018            Who to contact     If you have questions or need follow up information about today's clinic visit or your schedule please contact [facility] HEPATOLOGY directly at [phone].  Normal or non-critical lab and imaging results will be communicated to you by MyChart, letter or phone within 4 business days after the clinic has received the results. If you do not hear  "from us within 7 days, please contact the clinic through [employer] or phone. If you have a critical or abnormal lab result, we will notify you by phone as soon as possible.  Submit refill requests through [employer] or call your pharmacy and they will forward the refill request to us. Please allow 3 business days for your refill to be completed.          Additional Information About Your Visit        [employer] Information     [employer] gives you secure access to your electronic health record. If you see a primary care provider, you can also send messages to your care team and make appointments. If you have questions, please call your primary care clinic.  If you do not have a primary care provider, please call [phone] and they will assist you.        Care EveryWhere ID     This is your Care EveryWhere ID. This could be used by other organizations to access your [facility] medical records  [id number]        Your Vitals Were     Pulse Temperature Height Pulse Oximetry BMI (Body Mass Index)       71 98.2  F (36.8  C) (Oral) 1.631 m (5' 4.2\") 98% 41.52 kg/m2        Blood Pressure from Last 3 Encounters:   06/07/18 141/84   12/20/17 135/79   06/21/17 119/81    Weight from Last 3 Encounters:   06/07/18 110.4 kg (243 lb 6.4 oz)   12/20/17 111.2 kg (245 lb 1.6 oz)   06/21/17 108.3 kg (238 lb 11.2 oz)                 Where to get your medicines      These medications were sent to [facility] [address]  [address] [address]     Phone:  [phone]     azaTHIOprine 50 MG tablet          Primary Care Provider Office Phone # Fax #    Marley Coronado [phone]       [facility] [address]  [facility] [address]        Equal Access to Services     CALI SINGH : Roxy Rucker, elliott jeff, cara tariq. [facility] [phone].    ATENCIÓN: Si franny kan, " tiene a wood disposición servicios gratuitos de asistencia lingüística. Romero levin [phone].    We comply with applicable federal civil rights laws and [place] laws. We do not discriminate on the basis of race, color, national origin, age, disability, sex, sexual orientation, or gender identity.            Thank you!     Thank you for choosing [facility] HEPATOLOG[facility]  for your care. Our goal is always to provide you with excellent care. Hearing back from our patients is one way we can continue to improve our services. Please take a few minutes to complete the written survey that you may receive in the mail after your visit with us. Thank you!             Your Updated Medication List - Protect others around you: Learn how to safely use, store and throw away your medicines at www.disposemymeds.org.          This list is accurate as of 6/7/18  3:03 PM.  Always use your most recent med list.                   Brand Name Dispense Instructions for use Diagnosis    ascorbic acid 1000 MG Tabs    vitamin C     Take 1,000 mg by mouth daily.        atovaquone-proguanil 250-100 MG per tablet    MALARONE    32 tablet    Take 1 tablet by mouth daily Start 2 days before travel and continue 7 days after return.    Preventive medication therapy needed, Counseling for travel       azaTHIOprine 50 MG tablet    IMURAN    30 tablet    Take 1 tablet (50 mg) by mouth daily    Autoimmune hepatitis (H)       carboxymethylcellulose 0.5 % Soln ophthalmic solution    carboxymethylcellulose sodium    1 Bottle    Place 1-2 drops into both eyes 3 times daily as needed for dry eyes    Episcleritis of both eyes       cetirizine 10 MG tablet    zyrTEC     Take 10 mg by mouth daily as needed for allergies        ibuprofen 600 MG tablet    ADVIL/MOTRIN     Take 600 mg by mouth every 6 hours as needed.        IRON CR PO      Take 1 tablet by mouth daily.        VITAMIN D PO      Take  by mouth daily.

## 2018-06-07 NOTE — LETTER
2018      RE: Roxie Vargas  [address] [facility] [address]       Hepatology Clinic note  Roxie Vargas   Date of Birth 1960  Date of Service 2018         Assessment/plan:   Roxie Vargas is a 57 year old female with autoimmune hepatitis, maintained on 50 mg of azathioprine.  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  Her transaminases have been normal for the past 2 years and she has been very compliant with medications.  She may have some improvement in her fibrosis. We discussed re-evaluating with a non-invasive fibrosis scan today.  No biochemical or clinical signs of cirrhosis.  We did talk about the importance of maintaining a healthy weight including good nutrition, emphasizing moderation carbohydrates and regular exercise for continued liver health.     -Fibrosis scan today to reevaluate fibrosis  -Continue 50 mg of azathioprine daily  -Follow-up in clinic in 6 months with me or Dr. Daron Sanchez PA-C   [facility] Hepatology clinic    -----------------------------------------------------       HPI:   Roxie Vargas is a 57 year old Turkish female with Autoimmune Hepatitis presenting for the follow-up.    Autoimmune Hepatitis   -Diagnosed: 2015  -Prior biopsy: 2106, bridging fibrosis   -Prior treatments: Prednisone for induction at diagnosis, Azothioprine   Previous IG on 2016    Patient last saw Dr. Neri on 2017.  Patient has been maintained on 50 mg of azathioprine for the past 2 years.  Her transaminases have been normal since 2016.  Patient has no particular complaints or concerns today.  She states she takes her medications regularly and has had no problems with refills of her medications.  She denies any recent hospitalizations or ER visits.  No new medications.  She does state that she has gained some weight recently, likely due to decreased exercise.  She states that she does have some  "lower extremity swelling that comes and goes throughout the day on occasion.  She has regular bowel movements. She has a history of anemia and she states her PCP is investigating the cause. She states that she does take her iron supplement every day.     Patient denies jaundice, abdominal distension or confusion.  Patient also denies melena, hematochezia or hematemesis. Patient denies  fevers, sweats or chills.      She works at the [employer].  She does not drink alcohol.    Medical hx Surgical hx   Past Medical History:   Diagnosis Date     Autoimmune hepatitis (H)      Fibroids      Iron deficiency anemia      Ulcer (H)     Past Surgical History:   Procedure Laterality Date     COLONOSCOPY  4/1/2013    Procedure: COLONOSCOPY;;  Surgeon: Tobias Merchant MD;  Location: [facility] GI     dilation & curretage         uterine fibroids removed[  2005     wisdom teeth[                   Medications:     Current Outpatient Prescriptions   Medication     ascorbic acid (VITAMIN C) 1000 MG TABS     atovaquone-proguanil (MALARONE) 250-100 MG per tablet     azaTHIOprine (IMURAN) 50 MG tablet     carboxymethylcellulose (CARBOXYMETHYLCELLULOSE SODIUM) 0.5 % SOLN ophthalmic solution     cetirizine (ZYRTEC) 10 MG tablet     Cholecalciferol (VITAMIN D PO)     Ferrous Sulfate (IRON CR PO)     ibuprofen (ADVIL,MOTRIN) 600 MG tablet     No current facility-administered medications for this visit.             Allergies:     Allergies   Allergen Reactions     Shrimp Anaphylaxis     Aspirin Other (See Comments)     Watery eyes. Happened a long time ago.             Review of Systems:   10 points ROS was obtained and highlighted in the HPI, otherwise negative.          Physical Exam:   VS:  /84  Pulse 71  Temp 98.2  F (36.8  C) (Oral)  Ht 1.631 m (5' 4.2\")  Wt 110.4 kg (243 lb 6.4 oz)  SpO2 98%  BMI 41.52 kg/m2      Gen- well, NAD, A+Ox3, normal color  Lym- no palpable LAD  CVS- RRR  RS- CTA  Abd- central obesity, soft, " nontender. No shifting dullness  Extr- hands normal, no LINDA  Skin- no rash or jaundice  Neuro- no asterixis  Psych- normal mood         Data:   Reviewed in person and significant for:    Lab Results   Component Value Date     06/05/2018      Lab Results   Component Value Date    POTASSIUM 3.9 06/05/2018     Lab Results   Component Value Date    CHLORIDE 108 06/05/2018     Lab Results   Component Value Date    CO2 26 06/05/2018     Lab Results   Component Value Date    BUN 7 06/05/2018     Lab Results   Component Value Date    CR 0.59 06/05/2018       Lab Results   Component Value Date    WBC 6.8 06/05/2018     Lab Results   Component Value Date    HGB 11.6 06/05/2018     Lab Results   Component Value Date    HCT 36.3 06/05/2018     Lab Results   Component Value Date    MCV 85 06/05/2018     Lab Results   Component Value Date     06/05/2018       Lab Results   Component Value Date    AST 8 06/05/2018     Lab Results   Component Value Date    ALT 10 06/05/2018     No results found for: BILICONJ   Lab Results   Component Value Date    BILITOTAL 0.3 06/05/2018       Lab Results   Component Value Date    ALBUMIN 3.6 06/05/2018     Lab Results   Component Value Date    PROTTOTAL 8.2 06/05/2018      Lab Results   Component Value Date    ALKPHOS 82 06/05/2018       Lab Results   Component Value Date    INR 1.09 06/05/2018       Ridge Sanchez PA-C

## 2018-12-03 ENCOUNTER — TELEPHONE (OUTPATIENT)
Dept: GASTROENTEROLOGY | Facility: CLINIC | Age: 58
End: 2018-12-03

## 2018-12-03 DIAGNOSIS — K75.4 AUTOIMMUNE HEPATITIS (H): Primary | ICD-10-CM

## 2018-12-03 NOTE — TELEPHONE ENCOUNTER
[facility] Call Center    Phone Message    May a detailed message be left on voicemail: yes    Reason for Call: Other: Patient is requesting her lab orders be placed on her chart so she can use the lab on the [employer] tomorrow (she works on the [employer]).      Action Taken: Message routed to:  [facility] ([facility] Hepatology

## 2018-12-04 ENCOUNTER — PATIENT OUTREACH (OUTPATIENT)
Dept: CARE COORDINATION | Facility: CLINIC | Age: 58
End: 2018-12-04

## 2018-12-05 DIAGNOSIS — K75.4 AUTOIMMUNE HEPATITIS (H): ICD-10-CM

## 2018-12-05 LAB
ALBUMIN SERPL-MCNC: 3.6 G/DL (ref 3.4–5)
ALP SERPL-CCNC: 85 U/L (ref 40–150)
ALT SERPL W P-5'-P-CCNC: 14 U/L (ref 0–50)
ANION GAP SERPL CALCULATED.3IONS-SCNC: 7 MMOL/L (ref 3–14)
AST SERPL W P-5'-P-CCNC: 10 U/L (ref 0–45)
BILIRUB DIRECT SERPL-MCNC: <0.1 MG/DL (ref 0–0.2)
BILIRUB SERPL-MCNC: 0.3 MG/DL (ref 0.2–1.3)
BUN SERPL-MCNC: 10 MG/DL (ref 7–30)
CALCIUM SERPL-MCNC: 9.2 MG/DL (ref 8.5–10.1)
CHLORIDE SERPL-SCNC: 106 MMOL/L (ref 94–109)
CO2 SERPL-SCNC: 27 MMOL/L (ref 20–32)
CREAT SERPL-MCNC: 0.67 MG/DL (ref 0.52–1.04)
ERYTHROCYTE [DISTWIDTH] IN BLOOD BY AUTOMATED COUNT: 14.7 % (ref 10–15)
GFR SERPL CREATININE-BSD FRML MDRD: 90 ML/MIN/1.7M2
GLUCOSE SERPL-MCNC: 85 MG/DL (ref 70–99)
HCT VFR BLD AUTO: 36.9 % (ref 35–47)
HGB BLD-MCNC: 11.3 G/DL (ref 11.7–15.7)
INR PPP: 1.05 (ref 0.86–1.14)
MCH RBC QN AUTO: 26.3 PG (ref 26.5–33)
MCHC RBC AUTO-ENTMCNC: 30.6 G/DL (ref 31.5–36.5)
MCV RBC AUTO: 86 FL (ref 78–100)
PLATELET # BLD AUTO: 185 10E9/L (ref 150–450)
POTASSIUM SERPL-SCNC: 3.6 MMOL/L (ref 3.4–5.3)
PROT SERPL-MCNC: 8.1 G/DL (ref 6.8–8.8)
RBC # BLD AUTO: 4.29 10E12/L (ref 3.8–5.2)
SODIUM SERPL-SCNC: 140 MMOL/L (ref 133–144)
WBC # BLD AUTO: 7.1 10E9/L (ref 4–11)

## 2018-12-05 PROCEDURE — 85610 PROTHROMBIN TIME: CPT | Performed by: PHYSICIAN ASSISTANT

## 2018-12-05 PROCEDURE — 36415 COLL VENOUS BLD VENIPUNCTURE: CPT | Performed by: PHYSICIAN ASSISTANT

## 2018-12-05 PROCEDURE — 85027 COMPLETE CBC AUTOMATED: CPT | Performed by: PHYSICIAN ASSISTANT

## 2018-12-05 PROCEDURE — 80076 HEPATIC FUNCTION PANEL: CPT | Performed by: PHYSICIAN ASSISTANT

## 2018-12-05 PROCEDURE — 80048 BASIC METABOLIC PNL TOTAL CA: CPT | Performed by: PHYSICIAN ASSISTANT

## 2018-12-06 ENCOUNTER — OFFICE VISIT (OUTPATIENT)
Dept: GASTROENTEROLOGY | Facility: CLINIC | Age: 58
End: 2018-12-06
Attending: PHYSICIAN ASSISTANT
Payer: COMMERCIAL

## 2018-12-06 VITALS
HEIGHT: 64 IN | HEART RATE: 121 BPM | DIASTOLIC BLOOD PRESSURE: 68 MMHG | SYSTOLIC BLOOD PRESSURE: 112 MMHG | TEMPERATURE: 98.6 F | BODY MASS INDEX: 41.13 KG/M2 | WEIGHT: 240.9 LBS

## 2018-12-06 DIAGNOSIS — K75.4 AUTOIMMUNE HEPATITIS (H): Primary | ICD-10-CM

## 2018-12-06 PROCEDURE — G0463 HOSPITAL OUTPT CLINIC VISIT: HCPCS | Mod: ZF

## 2018-12-06 ASSESSMENT — PAIN SCALES - GENERAL: PAINLEVEL: NO PAIN (0)

## 2018-12-06 NOTE — LETTER
2018      RE: Roxie Vargas  [address] [facility] [address]       Hepatology Clinic note  Roxie Vargas   Date of Birth 1960  Date of Service 2018         Assessment/plan:   Roxie Vargas is a 58 year old female with history of autoimmune hepatitis maintained on 50 mg of Azathioprine. Recent Fibrosis scan showing F0-F1, 5.8 kilopascals. Her transaminases are normal and low-normal platelets, otherwise normal liver function. Her transaminases have been normal and well controlled for two and half years. We discussed the importance of continued compliance with medications. We also discussed the importance of eating healthfully and starting regular exercise for weight maintenance and prevent of fatty liver.     - Continue 50 mg Azathioprine daily   - Repeat BMP, Hepatic panel, CBC, INR in 6 months  - Follow up with PCP for routine health maintenance    - Follow up in clinic in 6 months     Ridge Sanchez PA-C   [facility] Hepatology clinic    -----------------------------------------------------       HPI:   Roxie Vargas is a 58 year old female  presenting for the follow-up.    Autoimmune Hepatitis   -Diagnosed: 2015  -Prior biopsy: 2106, bridging fibrosis   Fibrosis Scan: 2018, F0-F1 , 5.8 kilopascals   -Prior treatments: Prednisone for induction at diagnosis, Azothioprine   Previous IG on 2016     Patient was last seen by me on 2018. She denies any new medications, ER visits or hospitalizations. She takes her Azathioprine with good compliance.     She states she has gained about 10 lbs over the past year. Her appetite is normal. Her bowel habits have been normal. Patient denies jaundice, lower extremity edema, abdominal distension or confusion.  Patient also denies melena, hematochezia or hematemesis. Patient denies weight loss, fevers, sweats or chills.    She does not drink alcohol.     Medical hx Surgical hx   Past Medical History:  "  Diagnosis Date     Autoimmune hepatitis (H)      Fibroids      Iron deficiency anemia      Ulcer     Past Surgical History:   Procedure Laterality Date     COLONOSCOPY  4/1/2013    Procedure: COLONOSCOPY;;  Surgeon: Tobias Merchant MD;  Location:  GI     dilation & curretage         uterine fibroids removed[  2005     wisdom teeth[                   Medications:     Current Outpatient Prescriptions   Medication     ascorbic acid (VITAMIN C) 1000 MG TABS     atovaquone-proguanil (MALARONE) 250-100 MG per tablet     azaTHIOprine (IMURAN) 50 MG tablet     Ferrous Sulfate (IRON CR PO)     carboxymethylcellulose (CARBOXYMETHYLCELLULOSE SODIUM) 0.5 % SOLN ophthalmic solution     Cholecalciferol (VITAMIN D PO)     ibuprofen (ADVIL,MOTRIN) 600 MG tablet     No current facility-administered medications for this visit.             Allergies:     Allergies   Allergen Reactions     Shrimp Anaphylaxis     Aspirin Other (See Comments)     Watery eyes. Happened a long time ago.             Review of Systems:   10 points ROS was obtained and highlighted in the HPI, otherwise negative.          Physical Exam:   VS:  /68  Pulse 121  Temp 98.6  F (37  C) (Oral)  Ht 1.626 m (5' 4\")  Wt 109.3 kg (240 lb 14.4 oz)  BMI 41.35 kg/m2      Gen- well, NAD, A+Ox3, normal color  Lym- no palpable LAD  CVS- RRR  RS- CTA  Abd- central adiposity, soft, nontender.   Extr- hands normal, no LINDA  Skin- no rash or jaundice  Neuro- no asterixis  Psych- normal mood         Data:   Reviewed in person and significant for:    Lab Results   Component Value Date     12/05/2018      Lab Results   Component Value Date    POTASSIUM 3.6 12/05/2018     Lab Results   Component Value Date    CHLORIDE 106 12/05/2018     Lab Results   Component Value Date    CO2 27 12/05/2018     Lab Results   Component Value Date    BUN 10 12/05/2018     Lab Results   Component Value Date    CR 0.67 12/05/2018       Lab Results   Component Value Date    WBC 7.1 " 12/05/2018     Lab Results   Component Value Date    HGB 11.3 12/05/2018     Lab Results   Component Value Date    HCT 36.9 12/05/2018     Lab Results   Component Value Date    MCV 86 12/05/2018     Lab Results   Component Value Date     12/05/2018       Lab Results   Component Value Date    AST 10 12/05/2018     Lab Results   Component Value Date    ALT 14 12/05/2018     No results found for: BILICONJ   Lab Results   Component Value Date    BILITOTAL 0.3 12/05/2018       Lab Results   Component Value Date    ALBUMIN 3.6 12/05/2018     Lab Results   Component Value Date    PROTTOTAL 8.1 12/05/2018      Lab Results   Component Value Date    ALKPHOS 85 12/05/2018       Lab Results   Component Value Date    INR 1.05 12/05/2018       Ridge Sanchez PA-C

## 2018-12-06 NOTE — PROGRESS NOTES
Hepatology Clinic note  Roxie Vargas   Date of Birth 1960  Date of Service 2018         Assessment/plan:   Roxie Vargas is a 58 year old female with history of autoimmune hepatitis maintained on 50 mg of Azathioprine. Recent Fibrosis scan showing F0-F1, 5.8 kilopascals. Her transaminases are normal and low-normal platelets, otherwise normal liver function. Her transaminases have been normal and well controlled for two and half years. We discussed the importance of continued compliance with medications. We also discussed the importance of eating healthfully and starting regular exercise for weight maintenance and prevent of fatty liver.     - Continue 50 mg Azathioprine daily   - Repeat BMP, Hepatic panel, CBC, INR in 6 months  - Follow up with PCP for routine health maintenance    - Follow up in clinic in 6 months     Ridge Sanchez PA-C   [facility] Hepatology clinic    -----------------------------------------------------       HPI:   Roxie Vargas is a 58 year old female  presenting for the follow-up.    Autoimmune Hepatitis   -Diagnosed: 2015  -Prior biopsy: 2106, bridging fibrosis   Fibrosis Scan: 2018, F0-F1 , 5.8 kilopascals   -Prior treatments: Prednisone for induction at diagnosis, Azothioprine   Previous IG on 2016     Patient was last seen by me on 2018. She denies any new medications, ER visits or hospitalizations. She takes her Azathioprine with good compliance.     She states she has gained about 10 lbs over the past year. Her appetite is normal. Her bowel habits have been normal. Patient denies jaundice, lower extremity edema, abdominal distension or confusion.  Patient also denies melena, hematochezia or hematemesis. Patient denies weight loss, fevers, sweats or chills.    She does not drink alcohol.     Medical hx Surgical hx   Past Medical History:   Diagnosis Date     Autoimmune hepatitis (H)      Fibroids      Iron deficiency anemia       "Ulcer     Past Surgical History:   Procedure Laterality Date     COLONOSCOPY  4/1/2013    Procedure: COLONOSCOPY;;  Surgeon: Tobias Merchant MD;  Location: [facility]     dilation & curretage         uterine fibroids removed[  2005     wisdom teeth[                   Medications:     Current Outpatient Prescriptions   Medication     ascorbic acid (VITAMIN C) 1000 MG TABS     atovaquone-proguanil (MALARONE) 250-100 MG per tablet     azaTHIOprine (IMURAN) 50 MG tablet     Ferrous Sulfate (IRON CR PO)     carboxymethylcellulose (CARBOXYMETHYLCELLULOSE SODIUM) 0.5 % SOLN ophthalmic solution     Cholecalciferol (VITAMIN D PO)     ibuprofen (ADVIL,MOTRIN) 600 MG tablet     No current facility-administered medications for this visit.             Allergies:     Allergies   Allergen Reactions     Shrimp Anaphylaxis     Aspirin Other (See Comments)     Watery eyes. Happened a long time ago.             Review of Systems:   10 points ROS was obtained and highlighted in the HPI, otherwise negative.          Physical Exam:   VS:  /68  Pulse 121  Temp 98.6  F (37  C) (Oral)  Ht 1.626 m (5' 4\")  Wt 109.3 kg (240 lb 14.4 oz)  BMI 41.35 kg/m2      Gen- well, NAD, A+Ox3, normal color  Lym- no palpable LAD  CVS- RRR  RS- CTA  Abd- central adiposity, soft, nontender.   Extr- hands normal, no LINDA  Skin- no rash or jaundice  Neuro- no asterixis  Psych- normal mood         Data:   Reviewed in person and significant for:    Lab Results   Component Value Date     12/05/2018      Lab Results   Component Value Date    POTASSIUM 3.6 12/05/2018     Lab Results   Component Value Date    CHLORIDE 106 12/05/2018     Lab Results   Component Value Date    CO2 27 12/05/2018     Lab Results   Component Value Date    BUN 10 12/05/2018     Lab Results   Component Value Date    CR 0.67 12/05/2018       Lab Results   Component Value Date    WBC 7.1 12/05/2018     Lab Results   Component Value Date    HGB 11.3 12/05/2018     Lab Results "   Component Value Date    HCT 36.9 12/05/2018     Lab Results   Component Value Date    MCV 86 12/05/2018     Lab Results   Component Value Date     12/05/2018       Lab Results   Component Value Date    AST 10 12/05/2018     Lab Results   Component Value Date    ALT 14 12/05/2018     No results found for: BILICONJ   Lab Results   Component Value Date    BILITOTAL 0.3 12/05/2018       Lab Results   Component Value Date    ALBUMIN 3.6 12/05/2018     Lab Results   Component Value Date    PROTTOTAL 8.1 12/05/2018      Lab Results   Component Value Date    ALKPHOS 85 12/05/2018       Lab Results   Component Value Date    INR 1.05 12/05/2018

## 2018-12-06 NOTE — MR AVS SNAPSHOT
After Visit Summary   12/6/2018    Roxie Vargas    MRN: [id number]           Patient Information     Date Of Birth          1960        Visit Information        Provider Department      12/6/2018 2:30 PM Ridge Sanchez PA-C [facility] Hepatology         Follow-ups after your visit        Follow-up notes from your care team     Return in about 6 months (around 6/6/2019).      Your next 10 appointments already scheduled     Jun 06, 2019  2:30 PM CDT   (Arrive by 2:15 PM)   Return General Liver with HUBER Baca [facility] Hepatology ([facility])    [address]  [address]  [facility] [facility] [address] [phone]              Who to contact     If you have questions or need follow up information about today's clinic visit or your schedule please contact [facility] HEPATOLOGY directly at [phone].  Normal or non-critical lab and imaging results will be communicated to you by Telefonicahart, letter or phone within 4 business days after the clinic has received the results. If you do not hear from us within 7 days, please contact the clinic through Telefonicahart or phone. If you have a critical or abnormal lab result, we will notify you by phone as soon as possible.  Submit refill requests through [employer] or call your pharmacy and they will forward the refill request to us. Please allow 3 business days for your refill to be completed.          Additional Information About Your Visit        MyChart Information     [employer] gives you secure access to your electronic health record. If you see a primary care provider, you can also send messages to your care team and make appointments. If you have questions, please call your primary care clinic.  If you do not have a primary care provider, please call [phone] and they will assist you.        Care EveryWhere ID     This is your Care EveryWhere ID. This could be used by other organizations to access your  "[place] medical records  [id number]        Your Vitals Were     Pulse Temperature Height BMI (Body Mass Index)          121 98.6  F (37  C) (Oral) 1.626 m (5' 4\") 41.35 kg/m2         Blood Pressure from Last 3 Encounters:   12/06/18 112/68   06/07/18 141/84   12/20/17 135/79    Weight from Last 3 Encounters:   12/06/18 109.3 kg (240 lb 14.4 oz)   06/07/18 110.4 kg (243 lb 6.4 oz)   12/20/17 111.2 kg (245 lb 1.6 oz)              Today, you had the following     No orders found for display         Today's Medication Changes          These changes are accurate as of 12/6/18  2:33 PM.  If you have any questions, ask your nurse or doctor.               Stop taking these medicines if you haven't already. Please contact your care team if you have questions.     cetirizine 10 MG tablet   Commonly known as:  zyrTEC   Stopped by:  Ridge Sanchez PA-C                    Primary Care Provider Office Phone # Fax #    Marley Carlos Coronado [phone]       [facility] [address] [facility] [address]        Equal Access to Services     CALI SINGH AH: Hadii chirag smitho Somarbin, waaxda luqadaha, qaybta kaalmada adeegyada, cara souza. So [facility] [phone].    ATENCIÓN: Si habla español, tiene a wood disposición servicios gratuitos de asistencia lingüística. Llame al [phone].    We comply with applicable federal civil rights laws and [place] laws. We do not discriminate on the basis of race, color, national origin, age, disability, sex, sexual orientation, or gender identity.            Thank you!     Thank you for choosing [facility] HEPATOLOGY  for your care. Our goal is always to provide you with excellent care. Hearing back from our patients is one way we can continue to improve our services. Please take a few minutes to complete the written survey that you may receive in the mail after your visit with us. Thank you!             Your Updated " Medication List - Protect others around you: Learn how to safely use, store and throw away your medicines at www.disposemymeds.org.          This list is accurate as of 12/6/18  2:33 PM.  Always use your most recent med list.                   Brand Name Dispense Instructions for use Diagnosis    atovaquone-proguanil 250-100 MG tablet    MALARONE    32 tablet    Take 1 tablet by mouth daily Start 2 days before travel and continue 7 days after return.    Preventive medication therapy needed, Counseling for travel       azaTHIOprine 50 MG tablet    IMURAN    30 tablet    Take 1 tablet (50 mg) by mouth daily    Autoimmune hepatitis (H)       carboxymethylcellulose 0.5 % Soln ophthalmic solution    carboxymethylcellulose sodium    1 Bottle    Place 1-2 drops into both eyes 3 times daily as needed for dry eyes    Episcleritis of both eyes       ibuprofen 600 MG tablet    ADVIL/MOTRIN     Take 600 mg by mouth every 6 hours as needed.        IRON CR PO      Take 1 tablet by mouth daily.        vitamin C 1000 MG Tabs    ASCORBIC ACID     Take 1,000 mg by mouth daily.        VITAMIN D PO      Take  by mouth daily.

## 2018-12-06 NOTE — NURSING NOTE
"/68  Pulse 121  Temp 98.6  F (37  C) (Oral)  Ht 1.626 m (5' 4\")  Wt 109.3 kg (240 lb 14.4 oz)  BMI 41.35 kg/m2  Chief Complaint   Patient presents with     RECHECK     follow up with hepatitis, tzimmer xma       "

## 2019-06-04 DIAGNOSIS — K75.4 AUTOIMMUNE HEPATITIS (H): Primary | ICD-10-CM

## 2019-06-05 DIAGNOSIS — K75.4 AUTOIMMUNE HEPATITIS (H): ICD-10-CM

## 2019-06-05 LAB
ALBUMIN SERPL-MCNC: 3.5 G/DL (ref 3.4–5)
ALP SERPL-CCNC: 79 U/L (ref 40–150)
ALT SERPL W P-5'-P-CCNC: 14 U/L (ref 0–50)
ANION GAP SERPL CALCULATED.3IONS-SCNC: 6 MMOL/L (ref 3–14)
AST SERPL W P-5'-P-CCNC: 12 U/L (ref 0–45)
BILIRUB DIRECT SERPL-MCNC: <0.1 MG/DL (ref 0–0.2)
BILIRUB SERPL-MCNC: 0.3 MG/DL (ref 0.2–1.3)
BUN SERPL-MCNC: 8 MG/DL (ref 7–30)
CALCIUM SERPL-MCNC: 8.7 MG/DL (ref 8.5–10.1)
CHLORIDE SERPL-SCNC: 108 MMOL/L (ref 94–109)
CO2 SERPL-SCNC: 27 MMOL/L (ref 20–32)
CREAT SERPL-MCNC: 0.68 MG/DL (ref 0.52–1.04)
ERYTHROCYTE [DISTWIDTH] IN BLOOD BY AUTOMATED COUNT: 15.4 % (ref 10–15)
GFR SERPL CREATININE-BSD FRML MDRD: >90 ML/MIN/{1.73_M2}
GLUCOSE SERPL-MCNC: 104 MG/DL (ref 70–99)
HCT VFR BLD AUTO: 37.1 % (ref 35–47)
HGB BLD-MCNC: 11.5 G/DL (ref 11.7–15.7)
INR PPP: 0.98 (ref 0.86–1.14)
MCH RBC QN AUTO: 26.7 PG (ref 26.5–33)
MCHC RBC AUTO-ENTMCNC: 31 G/DL (ref 31.5–36.5)
MCV RBC AUTO: 86 FL (ref 78–100)
PLATELET # BLD AUTO: 216 10E9/L (ref 150–450)
POTASSIUM SERPL-SCNC: 3.5 MMOL/L (ref 3.4–5.3)
PROT SERPL-MCNC: 8 G/DL (ref 6.8–8.8)
RBC # BLD AUTO: 4.3 10E12/L (ref 3.8–5.2)
SODIUM SERPL-SCNC: 141 MMOL/L (ref 133–144)
WBC # BLD AUTO: 6.8 10E9/L (ref 4–11)

## 2019-06-05 PROCEDURE — 36415 COLL VENOUS BLD VENIPUNCTURE: CPT | Performed by: PHYSICIAN ASSISTANT

## 2019-06-05 PROCEDURE — 85027 COMPLETE CBC AUTOMATED: CPT | Performed by: PHYSICIAN ASSISTANT

## 2019-06-05 PROCEDURE — 80076 HEPATIC FUNCTION PANEL: CPT | Performed by: PHYSICIAN ASSISTANT

## 2019-06-05 PROCEDURE — 80048 BASIC METABOLIC PNL TOTAL CA: CPT | Performed by: PHYSICIAN ASSISTANT

## 2019-06-05 PROCEDURE — 85610 PROTHROMBIN TIME: CPT | Performed by: PHYSICIAN ASSISTANT

## 2019-06-07 ENCOUNTER — OFFICE VISIT (OUTPATIENT)
Dept: GASTROENTEROLOGY | Facility: CLINIC | Age: 59
End: 2019-06-07
Attending: PHYSICIAN ASSISTANT
Payer: COMMERCIAL

## 2019-06-07 VITALS
BODY MASS INDEX: 41.44 KG/M2 | WEIGHT: 241.4 LBS | TEMPERATURE: 98.9 F | OXYGEN SATURATION: 97 % | HEART RATE: 95 BPM | DIASTOLIC BLOOD PRESSURE: 78 MMHG | SYSTOLIC BLOOD PRESSURE: 121 MMHG

## 2019-06-07 DIAGNOSIS — K75.4 AUTOIMMUNE HEPATITIS (H): ICD-10-CM

## 2019-06-07 PROCEDURE — G0463 HOSPITAL OUTPT CLINIC VISIT: HCPCS | Mod: ZF

## 2019-06-07 RX ORDER — AZATHIOPRINE 50 MG/1
50 TABLET ORAL DAILY
Qty: 30 TABLET | Refills: 11 | Status: SHIPPED | OUTPATIENT
Start: 2019-06-07 | End: 2021-08-08

## 2019-06-07 ASSESSMENT — PAIN SCALES - GENERAL: PAINLEVEL: NO PAIN (0)

## 2019-06-07 NOTE — NURSING NOTE
"Chief Complaint   Patient presents with     RECHECK     6 month follwo up Autoimmune Hep     Vital signs:  Temp: 98.9  F (37.2  C) Temp src: Oral BP: 121/78 Pulse: 95     SpO2: 97 %       Weight: 109.5 kg (241 lb 6.4 oz)  Estimated body mass index is 41.44 kg/m  as calculated from the following:    Height as of 12/6/18: 1.626 m (5' 4\").    Weight as of this encounter: 109.5 kg (241 lb 6.4 oz).      Dipika Cordon CMA    "

## 2019-06-07 NOTE — PROGRESS NOTES
Hepatology Follow-up Clinic note  Roxie Vargas   Date of Birth 1960  Date of Service 2019    Reason for follow-up: Autoimmune Hepatitis          Assessment/plan:   Roxie Vargas is a 58 year old female with history of autoimmune hepatitis who is maintained on 50 mg Azathioprine. Her fibrosis scan showing F0-F1, 5.8 kilopascals. Her transaminases remain normal. Her liver function is otherwise normal. She has no physical stigmata of chronic liver disease. She is compliant with her medications and understands the importance of taking her medications and having regular follow-up. We discussed recommendations of weight loss to reduce risk factors for additional co-morbidities.     - Hepatic panel and CBC in 6 months  - Continue 50 mg Azathioprine daily  - Increase physical activity   - Recommend slow gradual weight loss  - Follow up with PCP for routine health maintenance   - Follow up in clinic in one year    Ridge Sanchez PA-C   [facility] Hepatology clinic    Total time involved with patient was 25 minutes with >50% of time involved with counseling and coordination of care as noted above.     -----------------------------------------------------       HPI:   Roxie Vargas is a 58 year old female presenting for follow-up.     Autoimmune Hepatitis   -Diagnosed: 2015  -Prior biopsy: 2106, bridging fibrosis   Fibrosis Scan: 2018, F0-F1 , 5.8 kilopascals   -Prior treatments:   - Prednisone for induction at diagnosis,   Azothioprine since 2016  Previous IG on 2016    Patient was last seen 2018. She denies any recent hospitalizations, ER visits or new medications. She has continued taking Azathioprine without difficulty.     She states she has gained weight again. 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 She does try to walk around at work and then to the parking ramp.    Patient denies  jaundice, abdominal distension or confusion.  Patient also denies melena, hematochezia or hematemesis. Patient denies weight loss, fevers, sweats or chills.    She does not drink alcohol.     Medical hx Surgical hx   Past Medical History:   Diagnosis Date     Autoimmune hepatitis (H)      Fibroids      Iron deficiency anemia      Ulcer     Past Surgical History:   Procedure Laterality Date     COLONOSCOPY  4/1/2013    Procedure: COLONOSCOPY;;  Surgeon: Tobias Merchant MD;  Location: [facility]     dilation & curretage         uterine fibroids removed[  2005     wisdom teeth[                   Medications:     Current Outpatient Medications   Medication     ascorbic acid (VITAMIN C) 1000 MG TABS     atovaquone-proguanil (MALARONE) 250-100 MG per tablet     azaTHIOprine (IMURAN) 50 MG tablet     Cholecalciferol (VITAMIN D PO)     Ferrous Sulfate (IRON CR PO)     ibuprofen (ADVIL,MOTRIN) 600 MG tablet     carboxymethylcellulose (CARBOXYMETHYLCELLULOSE SODIUM) 0.5 % SOLN ophthalmic solution     No current facility-administered medications for this visit.           Allergies:     Allergies   Allergen Reactions     Shrimp Anaphylaxis     Aspirin Other (See Comments)     Watery eyes. Happened a long time ago.             Review of Systems:   10 points ROS was obtained and highlighted in the HPI, otherwise negative.          Physical Exam:   VS:  /78 (BP Location: Right arm)   Pulse 95   Temp 98.9  F (37.2  C) (Oral)   Wt 109.5 kg (241 lb 6.4 oz)   SpO2 97%   BMI 41.44 kg/m        Gen: A&Ox3, NAD, obese  HEENT: non-icteric   CV: RRR, no overt murmurs  Lung: CTA Bilatererally, no wheezing or crackles.   Lym- no palpable lymphadenopathy  Abd: central adiposity, soft, NT, ND, no organomegaly.   Ext: intact pulses, trace edema   Skin: No rash,  no palmar erythema, telangiectasias or jaundice  Neuro: grossly intact, no asterixis   Psych: appropriate mood and affects           Data:   Reviewed in person and significant  for:    Lab Results   Component Value Date     06/05/2019      Lab Results   Component Value Date    POTASSIUM 3.5 06/05/2019     Lab Results   Component Value Date    CHLORIDE 108 06/05/2019     Lab Results   Component Value Date    CO2 27 06/05/2019     Lab Results   Component Value Date    BUN 8 06/05/2019     Lab Results   Component Value Date    CR 0.68 06/05/2019       Lab Results   Component Value Date    WBC 6.8 06/05/2019     Lab Results   Component Value Date    HGB 11.5 06/05/2019     Lab Results   Component Value Date    HCT 37.1 06/05/2019     Lab Results   Component Value Date    MCV 86 06/05/2019     Lab Results   Component Value Date     06/05/2019       Lab Results   Component Value Date    AST 12 06/05/2019     Lab Results   Component Value Date    ALT 14 06/05/2019     No results found for: BILICONJ   Lab Results   Component Value Date    BILITOTAL 0.3 06/05/2019       Lab Results   Component Value Date    ALBUMIN 3.5 06/05/2019     Lab Results   Component Value Date    PROTTOTAL 8.0 06/05/2019      Lab Results   Component Value Date    ALKPHOS 79 06/05/2019       Lab Results   Component Value Date    INR 0.98 06/05/2019

## 2019-10-23 NOTE — PROGRESS NOTES
Subjective     Roxie Vargas is a 59 year old female who presents to clinic today for the following health issues:    HPI   Joint Pain    Onset: ongoing x2 years    Description:   Location: left knee and right knee  Character: Sharp    Intensity: mild    Progression of Symptoms: worse    Accompanying Signs & Symptoms:  Other symptoms: none, but worse when climbing or going down stairs    History:   Previous similar pain: no       Precipitating factors:   Trauma or overuse: no     Alleviating factors:  Improved by: nothing    Therapies Tried and outcome: Physical therapy, not effective    -Patient reports bilateral knee pain for the last two years  -States that her pain came out of nowhere, not triggered by anything specific, gradually worsened, describes it as a 2-3/10 when going up stairs or getting in and out of the car, otherwise is not painful  -Notes that her knee will pop at times, does not hurt when this occurs  -States that her knee will lock up at times and be difficult to move, knee does not give out on her  -Patient will take Tylenol or Advil when the pain is intense, but does not take it regularly  -She has seen PT in the past but it was not effective  -She has a PMHx of autoimmune hepatitis  -Denies pain in other joints, knee swelling    Eyes  -Patient reports pain in her eye, believes it could be related to allergies, seems to occur more often when the weather gets cold  -She sometimes uses eye drops that were prescribed to her in the past for itching in her eyes  -Denies discharge    Problem list and histories reviewed & adjusted, as indicated.  Additional history: as documented    ROS:  CONSTITUTIONAL: NEGATIVE for fever, chills, change in weight  INTEGUMENTARY/SKIN: NEGATIVE for worrisome rashes, moles or lesions  EYES: NEGATIVE for vision changes, POSITIVE irritation  ENT/MOUTH: NEGATIVE for ear, mouth and throat problems  RESP: NEGATIVE for significant cough or SOB  BREAST: NEGATIVE for masses,  tenderness or discharge  CV: NEGATIVE for chest pain, palpitations or peripheral edema  GI: NEGATIVE for nausea, abdominal pain, heartburn, or change in bowel habits  : NEGATIVE for frequency, dysuria, or hematuria  MUSCULOSKELETAL: NEGATIVE for significant myalgia, POSITIVE arthralgias  NEURO: NEGATIVE for weakness, dizziness or paresthesias  ENDOCRINE: NEGATIVE for temperature intolerance, skin/hair changes  HEME: NEGATIVE for bleeding problems  PSYCHIATRIC: NEGATIVE for changes in mood or affect    This document serves as a record of the services and decisions personally performed and made by Ange Saez PA-C. It was created on her behalf by Martin Gary, trained medical scribe. The creation of this document is based on the provider's statements to the medical scribe.  Martin Gary 10:05 AM October 24, 2019    Patient Active Problem List   Diagnosis     Anemia     Astigmatism     Myopia     Acute hepatitis     Autoimmune hepatitis (H)     Pain in thumb joint with movement of left hand     Radial styloid tenosynovitis     Synovitis and tenosynovitis     Morbid obesity (H)     Past Surgical History:   Procedure Laterality Date     COLONOSCOPY  4/1/2013    Procedure: COLONOSCOPY;;  Surgeon: Tobias Merchant MD;  Location: [facility]     dilation & curretage         uterine fibroids removed[  2005     wisdom teeth[         Social History     Tobacco Use     Smoking status: Never Smoker     Smokeless tobacco: Never Used   Substance Use Topics     Alcohol use: No     Alcohol/week: 0.0 standard drinks     Family History   Problem Relation Age of Onset     Asthma Sister      Peptic Ulcer Disease Sister         and 2 nephews           Labs reviewed in EPIC  Patient Active Problem List   Diagnosis     Anemia     Astigmatism     Myopia     Acute hepatitis     Autoimmune hepatitis (H)     Pain in thumb joint with movement of left hand     Radial styloid tenosynovitis     Synovitis and tenosynovitis     Morbid obesity (H)  "    Past Surgical History:   Procedure Laterality Date     COLONOSCOPY  4/1/2013    Procedure: COLONOSCOPY;;  Surgeon: Tobias Merchant MD;  [facility]     dilation & curretage         uterine fibroids removed[  2005     wisdom teeth[         Social History     Tobacco Use     Smoking status: Never Smoker     Smokeless tobacco: Never Used   Substance Use Topics     Alcohol use: No     Alcohol/week: 0.0 standard drinks     Family History   Problem Relation Age of Onset     Asthma Sister      Peptic Ulcer Disease Sister         and 2 nephews         Current Outpatient Medications   Medication Sig Dispense Refill     ascorbic acid (VITAMIN C) 1000 MG TABS Take 1,000 mg by mouth daily.       atovaquone-proguanil (MALARONE) 250-100 MG per tablet Take 1 tablet by mouth daily Start 2 days before travel and continue 7 days after return. 32 tablet 0     azaTHIOprine (IMURAN) 50 MG tablet Take 1 tablet (50 mg) by mouth daily 30 tablet 11     carboxymethylcellulose (CARBOXYMETHYLCELLULOSE SODIUM) 0.5 % SOLN ophthalmic solution Place 1-2 drops into both eyes 3 times daily as needed for dry eyes 1 Bottle 3     Cholecalciferol (VITAMIN D PO) Take  by mouth daily.       Ferrous Sulfate (IRON CR PO) Take 1 tablet by mouth daily.       ibuprofen (ADVIL,MOTRIN) 600 MG tablet Take 600 mg by mouth every 6 hours as needed.         OBJECTIVE:                                                    /70   Pulse 79   Temp 98.5  F (36.9  C) (Oral)   Ht 1.628 m (5' 4.09\")   Wt 111.5 kg (245 lb 14.4 oz)   SpO2 100%   BMI 42.08 kg/m   Body mass index is 42.08 kg/m .   GENERAL:: healthy, alert and no distress  EYES: Eyes grossly normal to inspection, PERRL and conjunctivae and sclerae normal, pteryerium in the left lateral eye  MS: extremities- no gross deformities noted, no edema, pain over the patella with resisted combined knee flexion and dorsiflexion bilaterally, negative anterior and posterior drawer's sign on the left, knee " "crepitus on the left with extension bilaterally, bilateral hip pain with hip flexion  SKIN: no suspicious lesions, no rashes  NEURO: strength and tone- normal, sensory exam- grossly normal, mentation- intact, speech- normal, reflexes- symmetric  PSYCH: Alert and oriented times 3; speech- coherent , normal rate and volume; able to articulate logical thoughts, able to abstract reason, no tangential thoughts, no hallucinations or delusions, affect- normal    No results found for this or any previous visit (from the past 24 hour(s)).       ASSESSMENT/PLAN:                                                        ICD-10-CM    1. Chronic pain of both knees M25.561 XR Knee AP Standing Bilateral    M25.562 XR Knee Bilateral 3 Views    G89.29 ORTHO  REFERRAL   2. Morbid obesity (H) E66.01      Likely arthritis. Failed physical therapy. Follow up with ortho. Weight loss would help. Return to clinic for any new or worsening symptoms or go to ER Urgent care in off hours     Patient Instructions   Get xrays done today  Schedule follow up with ortho  Use ibuprofen as needed  Return to clinic for any new or worsening symptoms or go to ER Urgent care in off hours           Estimated body mass index is 42.08 kg/m  as calculated from the following:    Height as of this encounter: 1.628 m (5' 4.09\").    Weight as of this encounter: 111.5 kg (245 lb 14.4 oz).       The information in this document, created by the medical scribe for me, accurately reflects the services I personally performed and the decisions made by me. I have reviewed and approved this document for accuracy prior to leaving the patient care area.  October 24, 2019 10:05 AM    Ange Saez  [facility] [place]    "

## 2019-10-24 ENCOUNTER — HOSPITAL ENCOUNTER (OUTPATIENT)
Dept: GENERAL RADIOLOGY | Facility: CLINIC | Age: 59
Discharge: HOME | End: 2019-10-24
Attending: PHYSICIAN ASSISTANT | Admitting: PHYSICIAN ASSISTANT
Payer: COMMERCIAL

## 2019-10-24 ENCOUNTER — OFFICE VISIT (OUTPATIENT)
Dept: FAMILY MEDICINE | Facility: CLINIC | Age: 59
End: 2019-10-24
Payer: COMMERCIAL

## 2019-10-24 ENCOUNTER — ANCILLARY PROCEDURE (OUTPATIENT)
Dept: MAMMOGRAPHY | Facility: CLINIC | Age: 59
End: 2019-10-24
Attending: INTERNAL MEDICINE
Payer: COMMERCIAL

## 2019-10-24 VITALS
HEART RATE: 79 BPM | DIASTOLIC BLOOD PRESSURE: 70 MMHG | BODY MASS INDEX: 41.98 KG/M2 | WEIGHT: 245.9 LBS | TEMPERATURE: 98.5 F | SYSTOLIC BLOOD PRESSURE: 111 MMHG | HEIGHT: 64 IN | OXYGEN SATURATION: 100 %

## 2019-10-24 DIAGNOSIS — M25.561 CHRONIC PAIN OF BOTH KNEES: ICD-10-CM

## 2019-10-24 DIAGNOSIS — G89.29 CHRONIC PAIN OF BOTH KNEES: Primary | ICD-10-CM

## 2019-10-24 DIAGNOSIS — M25.562 CHRONIC PAIN OF BOTH KNEES: ICD-10-CM

## 2019-10-24 DIAGNOSIS — M25.562 CHRONIC PAIN OF BOTH KNEES: Primary | ICD-10-CM

## 2019-10-24 DIAGNOSIS — M25.561 CHRONIC PAIN OF BOTH KNEES: Primary | ICD-10-CM

## 2019-10-24 DIAGNOSIS — Z12.31 VISIT FOR SCREENING MAMMOGRAM: ICD-10-CM

## 2019-10-24 DIAGNOSIS — G89.29 CHRONIC PAIN OF BOTH KNEES: ICD-10-CM

## 2019-10-24 DIAGNOSIS — E66.01 MORBID OBESITY (H): ICD-10-CM

## 2019-10-24 PROCEDURE — 73562 X-RAY EXAM OF KNEE 3: CPT | Mod: 50

## 2019-10-24 PROCEDURE — 99214 OFFICE O/P EST MOD 30 MIN: CPT | Performed by: PHYSICIAN ASSISTANT

## 2019-10-24 PROCEDURE — 77067 SCR MAMMO BI INCL CAD: CPT

## 2019-10-24 ASSESSMENT — MIFFLIN-ST. JEOR: SCORE: 1681.89

## 2019-10-24 NOTE — PATIENT INSTRUCTIONS
Get xrays done today  Schedule follow up with ortho  Use ibuprofen as needed  Return to clinic for any new or worsening symptoms or go to ER Urgent care in off hours

## 2019-11-04 NOTE — TELEPHONE ENCOUNTER
DIAGNOSIS: Chronic pain of both knees/Ange Saez PA-C/XR/Pref 1/ortho con   APPOINTMENT DATE: 11.7.19   NOTES STATUS DETAILS   OFFICE NOTE from referring provider Internal 10.24.19 Dr. Saez   OFFICE NOTE from other specialist N/A    DISCHARGE SUMMARY from hospital N/A    DISCHARGE REPORT from the ER N/A    OPERATIVE REPORT N/A    MEDICATION LIST Internal    IMPLANT RECORD/STICKER N/A    LABS     CBC/DIFF Internal 6.7.19   CULTURES N/A    INJECTIONS DONE IN RADIOLOGY N/A    MRI N/A    CT SCAN N/A    XRAYS (IMAGES & REPORTS) Internal 10.24.19     TUMOR     PATHOLOGY  Slides & report N/A

## 2019-11-05 ENCOUNTER — HEALTH MAINTENANCE LETTER (OUTPATIENT)
Age: 59
End: 2019-11-05

## 2019-11-07 ENCOUNTER — PRE VISIT (OUTPATIENT)
Dept: ORTHOPEDICS | Facility: CLINIC | Age: 59
End: 2019-11-07

## 2019-11-07 ENCOUNTER — OFFICE VISIT (OUTPATIENT)
Dept: ORTHOPEDICS | Facility: CLINIC | Age: 59
End: 2019-11-07
Payer: COMMERCIAL

## 2019-11-07 VITALS — BODY MASS INDEX: 41.97 KG/M2 | WEIGHT: 245.81 LBS | HEIGHT: 64 IN

## 2019-11-07 DIAGNOSIS — M17.0 PRIMARY OSTEOARTHRITIS OF BOTH KNEES: Primary | ICD-10-CM

## 2019-11-07 ASSESSMENT — PAIN SCALES - GENERAL: PAINLEVEL: MILD PAIN (3)

## 2019-11-07 ASSESSMENT — MIFFLIN-ST. JEOR: SCORE: 1681.43

## 2019-11-07 NOTE — LETTER
"  11/7/2019      RE: Roxie Vargas  [id number] [facility] [id number]       Sports Medicine Clinic Visit    PCP: Marley Coronado    Roxie Vargas is a 59 year old female who is seen  in consultation at the request of Dr. Saez presenting with bilateral knee pain. The left knee is worse.     Injury: None    Location of Pain: bilateral anterior knee pain  Duration of Pain: 2 year(s); 1 week worsening  Rating of Pain: 4/10  Pain is better with: No Treatment tried to date  Pain is worse with: Kneeling, walking  Additional Features: Popping. 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)  Treatment so far consists of: No Treatment tried to date  Prior History of related problems: None    Ht 1.628 m (5' 4.09\")   Wt 111.5 kg (245 lb 13 oz)   BMI 42.08 kg/m            PMH:  Past Medical History:   Diagnosis Date     Autoimmune hepatitis (H)      Colitis, ulcerative (H)      Fibroids      Heartburn      Iron deficiency anemia      Ulcer        Active problem list:  Patient Active Problem List   Diagnosis     Anemia     Astigmatism     Myopia     Acute hepatitis     Autoimmune hepatitis (H)     Pain in thumb joint with movement of left hand     Radial styloid tenosynovitis     Synovitis and tenosynovitis     Morbid obesity (H)       FH:  Family History   Problem Relation Age of Onset     Asthma Sister      Peptic Ulcer Disease Sister         and 2 nephews     Hypertension Mother        SH:  Social History     Socioeconomic History     Marital status: Single     Spouse name: Not on file     Number of children: Not on file     Years of education: Not on file     Highest education level: Not on file   Occupational History     Occupation: Medical Records     Employer: [employer]   Social Needs     Financial resource strain: Not on file     Food insecurity:     Worry: Not on file     Inability: Not on file     Transportation needs:     Medical: Not on file     Non-medical: Not on file "   Tobacco Use     Smoking status: Never Smoker     Smokeless tobacco: Never Used   Substance and Sexual Activity     Alcohol use: No     Alcohol/week: 0.0 standard drinks     Drug use: No     Sexual activity: Not Currently   Lifestyle     Physical activity:     Days per week: Not on file     Minutes per session: Not on file     Stress: Not on file   Relationships     Social connections:     Talks on phone: Not on file     Gets together: Not on file     Attends Congregation service: Not on file     Active member of club or organization: Not on file     Attends meetings of clubs or organizations: Not on file     Relationship status: Not on file     Intimate partner violence:     Fear of current or ex partner: Not on file     Emotionally abused: Not on file     Physically abused: Not on file     Forced sexual activity: Not on file   Other Topics Concern     Parent/sibling w/ CABG, MI or angioplasty before 65F 55M? Not Asked   Social History Narrative    Immigrated from [place] 1982.             How much exercise per week? Daily activities    How much calcium per day? supplements       How much caffeine per day? Tea 1     How much vitamin D per day? In supplements    Do you/your family wear seatbelts?  Yes    Do you/your family use safety helmets? Yes    Do you/your family use sunscreen? Yes    Do you/your family keep firearms in the home? No    Do you/your family have a smoke detector(s)? Yes            Reviewed [facility] 12-       MEDS:  See EMR, reviewed  ALL:  See EMR, reviewed    REVIEW OF SYSTEMS:  CONSTITUTIONAL:NEGATIVE for fever, chills, change in weight  INTEGUMENTARY/SKIN: NEGATIVE for worrisome rashes, moles or lesions  EYES: NEGATIVE for vision changes or irritation  ENT/MOUTH: NEGATIVE for ear, mouth and throat problems  RESP:NEGATIVE for significant cough or SOB  BREAST: NEGATIVE for masses, tenderness or discharge  CV: NEGATIVE for chest pain, palpitations or peripheral edema  GI: NEGATIVE for  "nausea, abdominal pain, heartburn, or change in bowel habits  :NEGATIVE for frequency, dysuria, or hematuria  :NEGATIVE for frequency, dysuria, or hematuria  NEURO: NEGATIVE for weakness, dizziness or paresthesias  ENDOCRINE: NEGATIVE for temperature intolerance, skin/hair changes  HEME/ALLERGY/IMMUNE: NEGATIVE for bleeding problems  PSYCHIATRIC: NEGATIVE for changes in mood or affect      Ht 1.628 m (5' 4.09\")   Wt 111.5 kg (245 lb 13 oz)   BMI 42.08 kg/m               KNEE 1-2 VIEWS,BILATERAL Oct 18, 2011 4:24:00 PM      HISTORY:   BILATERAL KNEE PAIN      COMPARISON: None.     FINDINGS: There is slight to moderate narrowing of the medial femoral  tibial joint spaces on the right and left. There is no evidence of the  knee effusion. There is question is subtle subchondral radiolucency,  on the lateral film, deep to the patellar cartilage on the right.  There is minimal osteophytic spur formation at the superior and  inferior poles of the right patella.     IMPRESSION:   1. Mild to moderate medial femoral tibial joint space narrowing  symmetrically and bilaterally consistent with some degenerative  change.     2. Some degenerative change thought to be present patellofemoral joint  on the right with marginal osteophytic spurs associated superior and  inferior pole of the patella and subchondral radiolucency approximate  8mm in size.             3 views bilateral knee(s) radiographs 10/24/2019 11:57 AM     History: Chronic pain of both knees; Chronic pain of both knees;  Chronic pain of both knees     Comparison: 10/18/2011     Findings:     Standing AP view of bilateral knees, lateral and patellofemoral views  of the each knee were obtained.      Left: No acute osseous abnormality.  No joint effusion.     Mild medial compartment joint space loss. Subchondral cystlike changes  of the patella.     No patellar tilt or lateral subluxation.  Soft tissue is unremarkable.     Right:  No acute osseous abnormality.  No " joint effusion.     Mild medial compartment joint space loss. Subchondral cystlike changes  of the patella.     No patellar tilt or lateral subluxation.  Soft tissue is unremarkable.                                                                      Impression:  1. No acute osseous abnormality.  2. Bilateral knee osteoarthrosis, most notable at the medial and  patellofemoral compartments.        PAT OSUNA            Subjective: This 59-year-old female in the last 4 days has had discomfort with her left knee especially with kneeling.  But when she looks back over the last year she is had a tendency to have intermittent bilateral knee discomfort with activity.  Especially with kneeling and stairs.  Is not associated with radicular pain in the extremity.  Is not associated with locking catching in the knee.  8 years ago she did some physical therapy for her knees and found it helpful.  At that time she had x-rays of both knees that showed mild amount of medial joint space narrowing.  Today she has standing x-rays of the knees that continue to show a mild amount of medial joint space narrowing as well as some patellofemoral DJD this more prominent on the right knee than the left knee.    Objective: Above ideal weight.  The bilateral knees reveal no effusion.  She can flex and extend both knees fully.  She is nontender over the patellar facets bilaterally.  Nontender over the medial joint line or the lateral joint line of either knee.  I can flex and extend both knees fully.  She has normal range of motion of both hips.  Anterior posterior drawer is negative bilaterally.  Nontender over the patellar tendon or pes anserine bursa or distal IT band of either knee.  Overlying skin is intact.  Appropriate conversation affect.    Assessment DJD of the bilateral knees    Plan: We went over x-rays in detail and compared them to x-rays in 2011.  We discussed options such as cortisone injections or Synvisc injections.   She knows if she wanted to pursue Synvisc injection she would need to check with her insurance company to see if it is covered.  She does not want injections at this time.  She would like to see physical therapy again as it was helpful in the past.  She is intent on losing weight which was encouraged.  She tries to limit her Tylenol use because she has a history of autoimmune hepatitis.  Her liver specialist allowed her to use it once a week and she is had recently normal liver function tests so I think this is reasonable.  They asked her to avoid nonsteroidal anti-inflammatories.  She knows injections are a possibility but she like to avoid them for now.  She will follow-up for injections as needed.    This note was created with the use of Dragon software and unintentional spelling or errors may have occurred.      Quinn Ramirez MD

## 2019-11-07 NOTE — PROGRESS NOTES
"Sports Medicine Clinic Visit    PCP: Marley Coronado    Roxie Vargas is a 59 year old female who is seen  in consultation at the request of Dr. Saez presenting with bilateral knee pain. The left knee is worse.     Injury: None    Location of Pain: bilateral anterior knee pain  Duration of Pain: 2 year(s); 1 week worsening  Rating of Pain: 4/10  Pain is better with: No Treatment tried to date  Pain is worse with: Kneeling, walking  Additional Features: Popping. 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)  Treatment so far consists of: No Treatment tried to date  Prior History of related problems: None    Ht 1.628 m (5' 4.09\")   Wt 111.5 kg (245 lb 13 oz)   BMI 42.08 kg/m           PMH:  Past Medical History:   Diagnosis Date     Autoimmune hepatitis (H)      Colitis, ulcerative (H)      Fibroids      Heartburn      Iron deficiency anemia      Ulcer        Active problem list:  Patient Active Problem List   Diagnosis     Anemia     Astigmatism     Myopia     Acute hepatitis     Autoimmune hepatitis (H)     Pain in thumb joint with movement of left hand     Radial styloid tenosynovitis     Synovitis and tenosynovitis     Morbid obesity (H)       FH:  Family History   Problem Relation Age of Onset     Asthma Sister      Peptic Ulcer Disease Sister         and 2 nephews     Hypertension Mother        SH:  Social History     Socioeconomic History     Marital status: Single     Spouse name: Not on file     Number of children: Not on file     Years of education: Not on file     Highest education level: Not on file   Occupational History     Occupation: Medical Records     Employer: [employer]   Social Needs     Financial resource strain: Not on file     Food insecurity:     Worry: Not on file     Inability: Not on file     Transportation needs:     Medical: Not on file     Non-medical: Not on file   Tobacco Use     Smoking status: Never Smoker     Smokeless tobacco: Never Used   Substance and " Sexual Activity     Alcohol use: No     Alcohol/week: 0.0 standard drinks     Drug use: No     Sexual activity: Not Currently   Lifestyle     Physical activity:     Days per week: Not on file     Minutes per session: Not on file     Stress: Not on file   Relationships     Social connections:     Talks on phone: Not on file     Gets together: Not on file     Attends [employer] service: Not on file     Active member of club or organization: Not on file     Attends meetings of clubs or organizations: Not on file     Relationship status: Not on file     Intimate partner violence:     Fear of current or ex partner: Not on file     Emotionally abused: Not on file     Physically abused: Not on file     Forced sexual activity: Not on file   Other Topics Concern     Parent/sibling w/ CABG, MI or angioplasty before 65F 55M? Not Asked   Social History Narrative    Immigrated from [place] 1982.             How much exercise per week? Daily activities    How much calcium per day? supplements       How much caffeine per day? Tea 1     How much vitamin D per day? In supplements    Do you/your family wear seatbelts?  Yes    Do you/your family use safety helmets? Yes    Do you/your family use sunscreen? Yes    Do you/your family keep firearms in the home? No    Do you/your family have a smoke detector(s)? Yes            Reviewed [facility] 12-       MEDS:  See EMR, reviewed  ALL:  See EMR, reviewed    REVIEW OF SYSTEMS:  CONSTITUTIONAL:NEGATIVE for fever, chills, change in weight  INTEGUMENTARY/SKIN: NEGATIVE for worrisome rashes, moles or lesions  EYES: NEGATIVE for vision changes or irritation  ENT/MOUTH: NEGATIVE for ear, mouth and throat problems  RESP:NEGATIVE for significant cough or SOB  BREAST: NEGATIVE for masses, tenderness or discharge  CV: NEGATIVE for chest pain, palpitations or peripheral edema  GI: NEGATIVE for nausea, abdominal pain, heartburn, or change in bowel habits  :NEGATIVE for frequency, dysuria, or  "hematuria  :NEGATIVE for frequency, dysuria, or hematuria  NEURO: NEGATIVE for weakness, dizziness or paresthesias  ENDOCRINE: NEGATIVE for temperature intolerance, skin/hair changes  HEME/ALLERGY/IMMUNE: NEGATIVE for bleeding problems  PSYCHIATRIC: NEGATIVE for changes in mood or affect      Ht 1.628 m (5' 4.09\")   Wt 111.5 kg (245 lb 13 oz)   BMI 42.08 kg/m              KNEE 1-2 VIEWS,BILATERAL Oct 18, 2011 4:24:00 PM      HISTORY:   BILATERAL KNEE PAIN      COMPARISON: None.     FINDINGS: There is slight to moderate narrowing of the medial femoral  tibial joint spaces on the right and left. There is no evidence of the  knee effusion. There is question is subtle subchondral radiolucency,  on the lateral film, deep to the patellar cartilage on the right.  There is minimal osteophytic spur formation at the superior and  inferior poles of the right patella.     IMPRESSION:   1. Mild to moderate medial femoral tibial joint space narrowing  symmetrically and bilaterally consistent with some degenerative  change.     2. Some degenerative change thought to be present patellofemoral joint  on the right with marginal osteophytic spurs associated superior and  inferior pole of the patella and subchondral radiolucency approximate  8mm in size.             3 views bilateral knee(s) radiographs 10/24/2019 11:57 AM     History: Chronic pain of both knees; Chronic pain of both knees;  Chronic pain of both knees     Comparison: 10/18/2011     Findings:     Standing AP view of bilateral knees, lateral and patellofemoral views  of the each knee were obtained.      Left: No acute osseous abnormality.  No joint effusion.     Mild medial compartment joint space loss. Subchondral cystlike changes  of the patella.     No patellar tilt or lateral subluxation.  Soft tissue is unremarkable.     Right:  No acute osseous abnormality.  No joint effusion.     Mild medial compartment joint space loss. Subchondral cystlike changes  of the " patella.     No patellar tilt or lateral subluxation.  Soft tissue is unremarkable.                                                                      Impression:  1. No acute osseous abnormality.  2. Bilateral knee osteoarthrosis, most notable at the medial and  patellofemoral compartments.        PAT OSUNA            Subjective: This 59-year-old female in the last 4 days has had discomfort with her left knee especially with kneeling.  But when she looks back over the last year she is had a tendency to have intermittent bilateral knee discomfort with activity.  Especially with kneeling and stairs.  Is not associated with radicular pain in the extremity.  Is not associated with locking catching in the knee.  8 years ago she did some physical therapy for her knees and found it helpful.  At that time she had x-rays of both knees that showed mild amount of medial joint space narrowing.  Today she has standing x-rays of the knees that continue to show a mild amount of medial joint space narrowing as well as some patellofemoral DJD this more prominent on the right knee than the left knee.    Objective: Above ideal weight.  The bilateral knees reveal no effusion.  She can flex and extend both knees fully.  She is nontender over the patellar facets bilaterally.  Nontender over the medial joint line or the lateral joint line of either knee.  I can flex and extend both knees fully.  She has normal range of motion of both hips.  Anterior posterior drawer is negative bilaterally.  Nontender over the patellar tendon or pes anserine bursa or distal IT band of either knee.  Overlying skin is intact.  Appropriate conversation affect.    Assessment DJD of the bilateral knees    Plan: We went over x-rays in detail and compared them to x-rays in 2011.  We discussed options such as cortisone injections or Synvisc injections.  She knows if she wanted to pursue Synvisc injection she would need to check with her insurance  company to see if it is covered.  She does not want injections at this time.  She would like to see physical therapy again as it was helpful in the past.  She is intent on losing weight which was encouraged.  She tries to limit her Tylenol use because she has a history of autoimmune hepatitis.  Her liver specialist allowed her to use it once a week and she is had recently normal liver function tests so I think this is reasonable.  They asked her to avoid nonsteroidal anti-inflammatories.  She knows injections are a possibility but she like to avoid them for now.  She will follow-up for injections as needed.    This note was created with the use of Dragon software and unintentional spelling or errors may have occurred.

## 2019-11-07 NOTE — Clinical Note
Thank you for allowing me to see your patient in Sports Medicine Clinic.  Please see the attached copy of our visit.Sincerely,Quinn Ramirez MD

## 2019-11-13 ENCOUNTER — THERAPY VISIT (OUTPATIENT)
Dept: PHYSICAL THERAPY | Facility: CLINIC | Age: 59
End: 2019-11-13
Attending: FAMILY MEDICINE
Payer: COMMERCIAL

## 2019-11-13 DIAGNOSIS — M17.0 OSTEOARTHRITIS OF BOTH KNEES, UNSPECIFIED OSTEOARTHRITIS TYPE: ICD-10-CM

## 2019-11-13 DIAGNOSIS — M17.0 PRIMARY OSTEOARTHRITIS OF BOTH KNEES: ICD-10-CM

## 2019-11-13 PROCEDURE — 97530 THERAPEUTIC ACTIVITIES: CPT | Mod: GP | Performed by: PHYSICAL THERAPIST

## 2019-11-13 PROCEDURE — 97110 THERAPEUTIC EXERCISES: CPT | Mod: GP | Performed by: PHYSICAL THERAPIST

## 2019-11-13 PROCEDURE — 97161 PT EVAL LOW COMPLEX 20 MIN: CPT | Mod: GP | Performed by: PHYSICAL THERAPIST

## 2019-11-13 ASSESSMENT — ACTIVITIES OF DAILY LIVING (ADL)
GO UP STAIRS: ACTIVITY IS MINIMALLY DIFFICULT
SIT WITH YOUR KNEE BENT: ACTIVITY IS MINIMALLY DIFFICULT
WEAKNESS: THE SYMPTOM AFFECTS MY ACTIVITY SLIGHTLY
WALK: ACTIVITY IS NOT DIFFICULT
GO DOWN STAIRS: ACTIVITY IS MINIMALLY DIFFICULT
KNEEL ON THE FRONT OF YOUR KNEE: I AM UNABLE TO DO THE ACTIVITY
HOW_WOULD_YOU_RATE_THE_OVERALL_FUNCTION_OF_YOUR_KNEE_DURING_YOUR_USUAL_DAILY_ACTIVITIES?: NEARLY NORMAL
AS_A_RESULT_OF_YOUR_KNEE_INJURY,_HOW_WOULD_YOU_RATE_YOUR_CURRENT_LEVEL_OF_DAILY_ACTIVITY?: NEARLY NORMAL
SWELLING: THE SYMPTOM AFFECTS MY ACTIVITY SLIGHTLY
SQUAT: ACTIVITY IS MINIMALLY DIFFICULT
STIFFNESS: THE SYMPTOM PREVENTS ME FROM ALL DAILY ACTIVITIES
GIVING WAY, BUCKLING OR SHIFTING OF KNEE: THE SYMPTOM PREVENTS ME FROM ALL DAILY ACTIVITIES
PAIN: THE SYMPTOM AFFECTS MY ACTIVITY SLIGHTLY
RISE FROM A CHAIR: ACTIVITY IS FAIRLY DIFFICULT
STAND: I AM UNABLE TO DO THE ACTIVITY

## 2019-11-13 NOTE — PROGRESS NOTES
[facility]  System  Physical Exam  General   ROS  KNEE EVALUATION    Physical Therapy Initial Examination/Evaluation November 13, 2019   Roxie Vargas is a 59 year old female referred to physical therapy by Dr. Quinn Ramirez for treatment of bilat knee OA with Precautions/Restrictions/MD instructions E&T   Therapist Assessment:   Clinical Impression: Pt presents to [facility]  with primary complaint of bilat knee pain with L Knee worse than R.  Per clinical examination, pt with significant strength deficits in bilat LEs, both hip and knee. Pt will benefit from skilled physical therapy for strength training as well as education on proper body mechanics for daily activities.      Subjective: Pt reports that both knees have been painful since 2011. She has had PT In the past. She has noticed that L hip has been more painful as well.   DOI/onset: MD order: 11/07/2019   Mechanism of injury: None   DOS NA   Related PMH: chronic bilat knee issues Previous treatment: PT    Imaging: X-ray    Chief Complaint: Bilat knee pain with L worse than R   Pain: rest 3 /10, activity 3/10  Described as: Achy  Alleviated by: none Exacerbated by: Cold, stairs, kneeling, sitting for prolonged periods Progression of symptoms since initial onset: staying the same or worsening Time of day when pain is worse: no time of day    Sleeping: no issues    Social history: No stairs in home   Occupation: Analyst  Job duties: computer work    Current HEP/exercise regimen: None   Patient's goals are find exercises to help manage the pain; get back to the gym    Other pertinent PMH: Hepatitis, Menopausal General health as reported by patient: Fair   Return to MD: prn        Static Posture  Foot: Pes planus/cavus: planus     Knee: Varus/Valgus: valgus    Hip: Adduction/IR: IR       Dynamic Movement Screen  Single leg stance observations: 5s on the L, 20s on the R  Double limb squat observations: Good motion, poor mechanics       Range of  Motion        Knee AROM Extension Flexion   Left    Right        Left Right  Hamstring ROM WNL WNL      Hip and Knee Strength   MMT Hip Abduction Hip Extension Hip Flexion  Knee Flexion Knee Ext   Left 3/5 2+/5 3/5 4-/5 4-/5   Right 3/5 2+/5 3/5 4/5 4+/5     Knee MMT  Quad Set Decreased strength bilat    SLR Quad lag on the L without cueing     PALPATION  Unable to reproduce pain with palpation     Assessment/Plan:  Patient is a 59 year old female with both sides knee complaints.    Patient has the following significant findings with corresponding treatment plan.                Diagnosis 1:  Primary Knee OA  Pain -  hot/cold therapy, self management, education and home program  Decreased strength - therapeutic exercise, therapeutic activities and home program  Impaired balance - neuro re-education, therapeutic activities and home program  Impaired muscle performance - neuro re-education and home program  Decreased function - therapeutic activities and home program    Therapy Evaluation Codes:   1) History comprised of:   Personal factors that impact the plan of care:      Overall behavior pattern, Past/current experiences and Time since onset of symptoms.    Comorbidity factors that impact the plan of care are:      Menopausal and Hepatitis.     Medications impacting care: Vitamins.  2) Examination of Body Systems comprised of:   Body structures and functions that impact the plan of care:      Knee.   Activity limitations that impact the plan of care are:      Sitting, Squatting/kneeling, Stairs and Standing.  3) Clinical presentation characteristics are:   Stable/Uncomplicated.  4) Decision-Making    Low complexity using standardized patient assessment instrument and/or measureable assessment of functional outcome.  Cumulative Therapy Evaluation is: Low complexity.    Previous and current functional limitations:  (See Goal Flow Sheet for this information)    Short term and Long term goals: (See Goal  Flow Sheet for this information)     Communication ability:  Patient appears to be able to clearly communicate and understand verbal and written communication and follow directions correctly.  Treatment Explanation - The following has been discussed with the patient:   RX ordered/plan of care  Anticipated outcomes  Possible risks and side effects  This patient would benefit from PT intervention to resume normal activities.   Rehab potential is good.    Frequency:  1 X week, once daily  Duration:  for 6 weeks  Discharge Plan:  Achieve all LTG.  Independent in home treatment program.  Reach maximal therapeutic benefit.    Please refer to the daily flowsheet for treatment today, total treatment time and time spent performing 1:1 timed codes.

## 2019-11-25 ENCOUNTER — THERAPY VISIT (OUTPATIENT)
Dept: PHYSICAL THERAPY | Facility: CLINIC | Age: 59
End: 2019-11-25
Payer: COMMERCIAL

## 2019-11-25 DIAGNOSIS — M17.0 OSTEOARTHRITIS OF BOTH KNEES, UNSPECIFIED OSTEOARTHRITIS TYPE: ICD-10-CM

## 2019-11-25 PROCEDURE — 97110 THERAPEUTIC EXERCISES: CPT | Mod: GP | Performed by: PHYSICAL THERAPIST

## 2019-12-11 NOTE — PROGRESS NOTES
HPI:  Patient complains of itching in both eyes. She uses allergy drops OTC which helps.       Pertinent Medical History:    Acute hepatitis    Autoimmune hepatitis    Morbid obesity    Anemia    Ulcerative colitis    Iron deficiency anemia    Ocular History:    Viral Conjunctivitis both eyes 2016    Eye Medications:    None    Assessment and Plan:  1.   Allergic Conjunctivitis, both eyes.      Start pataday qday both eyes.     2.   White Without Pressure, right eye. Retina attached.     Educated on signs and symptoms of a retinal detachment to be seen immediately.       Medical History:  Past Medical History:   Diagnosis Date     Autoimmune hepatitis (H)      Colitis, ulcerative (H)      Fibroids      Heartburn      Iron deficiency anemia      Ulcer        Medications:  Current Outpatient Medications   Medication Sig Dispense Refill     ascorbic acid (VITAMIN C) 1000 MG TABS Take 1,000 mg by mouth daily.       atovaquone-proguanil (MALARONE) 250-100 MG per tablet Take 1 tablet by mouth daily Start 2 days before travel and continue 7 days after return. 32 tablet 0     azaTHIOprine (IMURAN) 50 MG tablet Take 1 tablet (50 mg) by mouth daily 30 tablet 11     carboxymethylcellulose (CARBOXYMETHYLCELLULOSE SODIUM) 0.5 % SOLN ophthalmic solution Place 1-2 drops into both eyes 3 times daily as needed for dry eyes 1 Bottle 3     Cholecalciferol (VITAMIN D PO) Take  by mouth daily.       Ferrous Sulfate (IRON CR PO) Take 1 tablet by mouth daily.       ibuprofen (ADVIL,MOTRIN) 600 MG tablet Take 600 mg by mouth every 6 hours as needed.     Complete documentation of historical and exam elements from today's encounter can be found in the full encounter summary report (not reduplicated in this progress note). I personally obtained the chief complaint(s) and history of present illness.  I confirmed and edited as necessary the review of systems, past medical/surgical history, family history, social history, and examination  findings as documented by others; and I examined the patient myself. I personally reviewed the relevant tests, images, and reports as documented above. I formulated and edited as necessary the assessment and plan and discussed the findings and management plan with the patient and family. - William Perdue OD

## 2019-12-12 ENCOUNTER — OFFICE VISIT (OUTPATIENT)
Dept: OPHTHALMOLOGY | Facility: CLINIC | Age: 59
End: 2019-12-12
Payer: COMMERCIAL

## 2019-12-12 DIAGNOSIS — H10.13 ALLERGIC CONJUNCTIVITIS OF BOTH EYES: Primary | ICD-10-CM

## 2019-12-12 DIAGNOSIS — H35.89 WHITE WITHOUT PRESSURE OF PERIPHERAL RETINA OF RIGHT EYE: ICD-10-CM

## 2019-12-12 RX ORDER — OLOPATADINE HYDROCHLORIDE 2 MG/ML
1 SOLUTION/ DROPS OPHTHALMIC DAILY
Qty: 1 BOTTLE | Refills: 11 | Status: SHIPPED | OUTPATIENT
Start: 2019-12-12

## 2019-12-12 ASSESSMENT — CONF VISUAL FIELD
METHOD: COUNTING FINGERS
OD_NORMAL: 1
OS_NORMAL: 1

## 2019-12-12 ASSESSMENT — TONOMETRY
OS_IOP_MMHG: 10
OD_IOP_MMHG: 11
IOP_METHOD: ICARE

## 2019-12-12 ASSESSMENT — VISUAL ACUITY
OS_SC: 20/20
OD_SC: 20/20
METHOD: SNELLEN - LINEAR

## 2019-12-12 ASSESSMENT — SLIT LAMP EXAM - LIDS
COMMENTS: NORMAL
COMMENTS: NORMAL

## 2019-12-12 ASSESSMENT — EXTERNAL EXAM - RIGHT EYE: OD_EXAM: NORMAL

## 2019-12-12 ASSESSMENT — EXTERNAL EXAM - LEFT EYE: OS_EXAM: NORMAL

## 2019-12-12 NOTE — NURSING NOTE
Chief Complaints and History of Present Illnesses   Patient presents with     Eye Itching Both Eyes     Chief Complaint(s) and History of Present Illness(es)     Eye Itching Both Eyes     Laterality: both eyes    Characteristics: red blood on eye    Associated symptoms: irritation and itching.  Negative for burning and tearing    Pain scale: 0/10    Frequency: intermittently    Duration: 2 weeks    Course: gradually worsening    Treatments tried: allergy drops    Response to treatment: no improvement              Comments     Pt states noticed itchiness in both eyes starting 2 weeks ago. Pt states nothing helps it get better. Pt feels vision stays stable. Refresh -not used often.      EVER Barnhart COT 3:27 PM December 12, 2019

## 2019-12-20 ENCOUNTER — OFFICE VISIT (OUTPATIENT)
Dept: OBGYN | Facility: CLINIC | Age: 59
End: 2019-12-20
Attending: OBSTETRICS & GYNECOLOGY
Payer: COMMERCIAL

## 2019-12-20 VITALS
DIASTOLIC BLOOD PRESSURE: 80 MMHG | WEIGHT: 249 LBS | SYSTOLIC BLOOD PRESSURE: 121 MMHG | HEART RATE: 84 BPM | HEIGHT: 64 IN | BODY MASS INDEX: 42.51 KG/M2

## 2019-12-20 DIAGNOSIS — D25.9 UTERINE LEIOMYOMA, UNSPECIFIED LOCATION: Primary | ICD-10-CM

## 2019-12-20 DIAGNOSIS — Z12.4 SCREENING FOR MALIGNANT NEOPLASM OF CERVIX: ICD-10-CM

## 2019-12-20 DIAGNOSIS — Z01.419 ENCOUNTER FOR GYNECOLOGICAL EXAMINATION WITHOUT ABNORMAL FINDING: ICD-10-CM

## 2019-12-20 PROCEDURE — 87624 HPV HI-RISK TYP POOLED RSLT: CPT | Performed by: OBSTETRICS & GYNECOLOGY

## 2019-12-20 PROCEDURE — G0145 SCR C/V CYTO,THINLAYER,RESCR: HCPCS | Performed by: OBSTETRICS & GYNECOLOGY

## 2019-12-20 ASSESSMENT — ANXIETY QUESTIONNAIRES
3. WORRYING TOO MUCH ABOUT DIFFERENT THINGS: NOT AT ALL
6. BECOMING EASILY ANNOYED OR IRRITABLE: NOT AT ALL
GAD7 TOTAL SCORE: 0
1. FEELING NERVOUS, ANXIOUS, OR ON EDGE: NOT AT ALL
2. NOT BEING ABLE TO STOP OR CONTROL WORRYING: NOT AT ALL
7. FEELING AFRAID AS IF SOMETHING AWFUL MIGHT HAPPEN: NOT AT ALL
5. BEING SO RESTLESS THAT IT IS HARD TO SIT STILL: NOT AT ALL

## 2019-12-20 ASSESSMENT — MIFFLIN-ST. JEOR: SCORE: 1695.89

## 2019-12-20 ASSESSMENT — PATIENT HEALTH QUESTIONNAIRE - PHQ9
5. POOR APPETITE OR OVEREATING: NOT AT ALL
SUM OF ALL RESPONSES TO PHQ QUESTIONS 1-9: 0

## 2019-12-20 NOTE — LETTER
2019       RE: Roxie Vargas  [address] [facility] [address]     Dear Colleague,    Thank you for referring your patient, Roxie Vargas, to the [facility] at [facility]. Please see a copy of my visit note below.      Progress Note    SUBJECTIVE:  Roxie Vargas is an 59 year old, , who requests an Annual Preventive Exam.   She has a known hx of fibroids and is s/p myomectomy about  with ?recurrent or persistent fibroids by U/S's; most recent U/S 2017; with endometrial displacment; max 33 mm.  She denies vaginal spotting. She is not sexually active. Menopause at age 43. Yearly paps were recommended by Dr. Barth for Autoimmune hepatitis and ulcerative colitis on immunosuppression.        Menstrual History:  Menstrual History 2017   Menarche Age 16   Period Cycle (Days) post menapausal  46       Last    Lab Results   Component Value Date    PAP NIL 2017         Last   Lab Results   Component Value Date    HPV16 Negative 2017     Last   Lab Results   Component Value Date    HPV18 Negative 2017     Last   Lab Results   Component Value Date    HRHPV Negative 2017       Mammogram current: yes  Last Mammogram:   Ma Screening Digital Bilateral    Result Date: 10/25/2019  Narrative: Examination: Bilateral digital screening mammography with computer aided detection. Comparison: Mammogram 2017, 2016, 2015. History/Family history: No symptoms, routine screening. BREAST DENSITY: Heterogeneously dense. COMMENTS: No significant change.     Ma Screening Digital Bilateral    Result Date: 2017  Narrative: Examination: Bilateral digital screening mammography with computer aided detection. Comparison: Mammogram 2016, 2015, 11/3/2011 History: No symptoms, routine screening. BREAST DENSITY: Heterogeneously dense. COMMENTS: No significant change.        Last  Colonoscopy:  Results for orders placed or performed during the hospital encounter of 04/01/13   COLONOSCOPY   Result Value Ref Range    COLONOSCOPY       [facility], [place]   [address] [place] [address] [phone]     Endoscopy Department  _______________________________________________________________________________  Patient Name: Roxie Vargas          Procedure Date: 4/1/2013 08:04:SS A4/P4     MRN: [id number]                       Account Number: [id number]                  YOB: 1960              Admit Type: Outpatient                      Age: 52                               [facility]                     Gender: Female                        Note Status: Finalized                      Attending MD: Tobias Merchant MD            Pause for the Cause: Pause for the cause   completed  _______________________________________________________________________________     Procedure:                Colonoscopy  Indications:              Screening for colorectal malignant neoplasm  Providers:                Tobias Merchant MD, Yoselyn Jalloh RN  Patient Profile:          52 year old  female with h/o anemia                             for [id number] colonoscopy  Referring MD:             Shannen King NP  Medicines:                Fentanyl 100 micrograms IV, Midazolam 2 mg IV  Complications:            No immediate complications  _______________________________________________________________________________  Procedure:                Pre-Anesthesia Assessment:                            - Prior to the procedure, a History and Physical                             was performed, and patient medications and                             allergies were reviewed. The patient is competent.                             The risks and benefits of the procedure and the                             sedation options and risks were discussed with the                              patient. All questions were answered and informed                             consent was obtained. Patient identification and                             proposed procedure were verified by the physician                             in the pre-procedure area. Mental Status                             Examination: alert and oriented. Airway                             Examination: normal oropharyngeal airway and neck                             mobility. Respiratory Examination: clear to                             auscultation. CV Examination: normal. Prophylactic                             Antibiotics: The patient does not require                             prophylactic antibiotics. Prior Anticoagulants: The                             patient has taken no previous anticoagulant or                             antiplatelet agents. ASA Grade Assessment: II - A                             patient with mild systemic disease. After reviewing                             the risks and benefits, the patient was deemed in                             satisfactory condition to undergo the procedure.                             The anesthesia plan was to use moderate sedation /                             analgesia (conscious sedation). Immediately prior                             to administration of medications, the patient was                             re-assessed for adequacy to receive sedatives. The                             heart rate, respiratory rate, oxygen saturations,                             blood pressure, adequacy of pulmonary ventilation,                             and response to care were monitored throughout the                             procedure. The physical status of the patient was                             re-assessed after the procedure.                            After obtaining informed consent, the colonoscope                             was passed under direct  vision. Throughout the                             procedure, the patient's blood pressure, pulse, and                             oxygen saturations were monitored continuously. The                             Colonoscope was introduced through the anus and                             advanced to the cecum, identified by appendiceal                             orifice & ileocecal valve. The colonoscopy was                             performed without difficulty. The patient tolerated                             the procedure well. The quality of the bowel                             preparation was excellent.                                                                                   Findings:       The perianal and digital rectal examinations were normal. The colon        (entire examined portion) appeared normal. The retroflexed view of the        distal rectum was normal and showed no anal or rectal abnormalities.                                                                                   Impression:               - The entire examined colon is normal.  Recommendation:           - Discharge patient to home (with escort).                            - Repeat colonoscopy in 10 years for screening                             purposes.                            - Return to referring physician as previously                             scheduled.                                                                                     Signed electronically by Mendoza Merchant MD  _____________  Tobias Merchant MD  Signed Date: 4/1/2013 10:04:SS A4/P4  Number of Addenda: 0  I was physically present for the entire viewing portion of the exam.  __________________________  Signature of teaching physician  B4c/D4c  Note Initiated On: 4/1/2013 08:04:SS A4/P4  Scope Withdrawal Time: 0 hours 0 minutes 0 seconds   Scope Withdrawal Time: 0 hours 0 minutes 0 seconds   Total Procedure Duration: 0 hours 0 minutes 0 seconds           HISTORY:  ascorbic acid (VITAMIN C) 1000 MG TABS, Take 1,000 mg by mouth daily.  atovaquone-proguanil (MALARONE) 250-100 MG per tablet, Take 1 tablet by mouth daily Start 2 days before travel and continue 7 days after return.  azaTHIOprine (IMURAN) 50 MG tablet, Take 1 tablet (50 mg) by mouth daily  carboxymethylcellulose (CARBOXYMETHYLCELLULOSE SODIUM) 0.5 % SOLN ophthalmic solution, Place 1-2 drops into both eyes 3 times daily as needed for dry eyes  Cholecalciferol (VITAMIN D PO), Take  by mouth daily.  Ferrous Sulfate (IRON CR PO), Take 1 tablet by mouth daily.  ibuprofen (ADVIL,MOTRIN) 600 MG tablet, Take 600 mg by mouth every 6 hours as needed.  olopatadine (PATADAY) 0.2 % ophthalmic solution, Place 1 drop into both eyes daily    No current facility-administered medications on file prior to visit.     Allergies   Allergen Reactions     Shrimp Anaphylaxis     Aspirin Other (See Comments)     Watery eyes. Happened a long time ago.      Immunization History   Administered Date(s) Administered     HEPA 2008, 2008     HepA-Adult 2008     HepB 2008, 2008, 2008     HepB, Unspecified 1998, 1999, 1999     HepB-Adult 2008, 2008     Influenza (intradermal) 10/03/2011, 10/03/2012     Influenza Intranasal Vaccine 4 valent 10/05/2016     MMR 1991, 10/02/1991     Mantoux Tuberculin Skin Test 2001     Meningococcal (Menactra ) 11/15/2016     Pneumo Conj 13-V (2010&after) 11/15/2016     TD (ADULT, 7+) 10/02/2000     TDAP Vaccine (Adacel) 2008, 10/18/2011     TDAP Vaccine (Boostrix) 10/18/2011     Td (Adult), Adsorbed 1990     Tdap (Adacel,Boostrix) 2008     Typhoid IM 11/15/2016       OB History    Para Term  AB Living   1 0 0 0 1 1   SAB TAB Ectopic Multiple Live Births   0 1 0 0 0   Obstetric Comments   1-- hospitalization for weakness      Past Medical History:   Diagnosis Date     Autoimmune hepatitis  (H)      Colitis, ulcerative (H)      Fibroids      Heartburn      Iron deficiency anemia      Ulcer      Past Surgical History:   Procedure Laterality Date     COLONOSCOPY  4/1/2013    Procedure: COLONOSCOPY;;  Surgeon: Tobias Merchant MD;  Location:  GI     dilation & curretage         uterine fibroids removed[  2005     wisdom teeth[       Family History   Problem Relation Age of Onset     Asthma Sister      Peptic Ulcer Disease Sister         and 2 nephews     Hypertension Mother      Social History     Socioeconomic History     Marital status: Single     Spouse name: None     Number of children: None     Years of education: None     Highest education level: None   Occupational History     Occupation: Medical Records     Employer: [employer]   Social Needs     Financial resource strain: None     Food insecurity:     Worry: None     Inability: None     Transportation needs:     Medical: None     Non-medical: None   Tobacco Use     Smoking status: Never Smoker     Smokeless tobacco: Never Used   Substance and Sexual Activity     Alcohol use: No     Alcohol/week: 0.0 standard drinks     Drug use: No     Sexual activity: Not Currently   Lifestyle     Physical activity:     Days per week: None     Minutes per session: None     Stress: None   Relationships     Social connections:     Talks on phone: None     Gets together: None     Attends Yazidi service: None     Active member of club or organization: None     Attends meetings of clubs or organizations: None     Relationship status: None     Intimate partner violence:     Fear of current or ex partner: None     Emotionally abused: None     Physically abused: None     Forced sexual activity: None   Other Topics Concern     Parent/sibling w/ CABG, MI or angioplasty before 65F 55M? Not Asked   Social History Narrative    Immigrated from [place] 1982.             How much exercise per week? Daily activities    How much calcium per day? supplements        "How much caffeine per day? Tea 1     How much vitamin D per day? In supplements    Do you/your family wear seatbelts?  Yes    Do you/your family use safety helmets? Yes    Do you/your family use sunscreen? Yes    Do you/your family keep firearms in the home? No    Do you/your family have a smoke detector(s)? Yes            [facility]       ROS  [unfilled]  PHQ-9 SCORE 12/20/2019   PHQ-9 Total Score 0     IVANA-7 SCORE 12/20/2019   Total Score 0         EXAM:  Blood pressure 121/80, pulse 84, height 1.628 m (5' 4.09\"), weight 112.9 kg (249 lb), not currently breastfeeding. Body mass index is 42.62 kg/m .  General - pleasant female in no acute distress.  Skin - no suspicious lesions or rashes  EENT-  PERRLA, euthyroid with out palpable nodules  Neck - supple without lymphadenopathy.  Lungs - clear to auscultation bilaterally.  Heart - regular rate and rhythm without murmur.  Abdomen - soft, nontender, nondistended, no masses or organomegaly noted.  Musculoskeletal - no gross deformities.  Neurological - normal strength, sensation, and mental status.    Breast Exam:  Breast: Without visible skin changes. No dimpling or lesions seen.   Breasts supple, non-tender with palpation, no dominant mass, nodularity, or nipple discharge noted bilaterally. Axillary nodes negative.      Pelvic Exam:  EG/BUS: Normal genital architecture without lesions, erythema or abnormal secretions Bartholin's, Urethra, [place]'s normal   Urethral meatus: normal   Urethra: no masses, tenderness, or scarring   Bladder: no masses or tenderness   Vagina: atrophic, thin, dry with absent  secretions  Cervix: Nulliparous, difficult to visualize in entirely and palpate due to scarring at fornix.  Uterus: exam findings compromised by body habitus  Adnexa: Not palpable secondary to body habitus        ASSESSMENT:  Encounter Diagnoses   Name Primary?     Screening for malignant neoplasm of cervix      Encounter for gynecological examination " without abnormal finding      Uterine leiomyoma, unspecified location Yes    On immunosuppresive drug; will check pap more frequently.    PLAN:   Orders Placed This Encounter   Procedures     Pap Smear Exam [] Do Not Remove     Pap imaged thin layer screen with HPV - recommended age 30 - 65 years (select HPV order below)     HPV High Risk Types DNA Cervical     Return to clinic in one year.  Follow-up as needed.    Gloria Baxter MD

## 2019-12-20 NOTE — PROGRESS NOTES
Progress Note    SUBJECTIVE:  Roxie Vargas is an 59 year old, , who requests an Annual Preventive Exam.   She has a known hx of fibroids and is s/p myomectomy about  with ?recurrent or persistent fibroids by U/S's; most recent U/S 2017; with endometrial displacment; max 33 mm.  She denies vaginal spotting. She is not sexually active. Menopause at age 43. Yearly paps were recommended by Dr. Barth for Autoimmune hepatitis and ulcerative colitis on immunosuppression.        Menstrual History:  Menstrual History 2017   Menarche Age 16   Period Cycle (Days) post menapausal  46       Last    Lab Results   Component Value Date    PAP NIL 2017         Last   Lab Results   Component Value Date    HPV16 Negative 2017     Last   Lab Results   Component Value Date    HPV18 Negative 2017     Last   Lab Results   Component Value Date    HRHPV Negative 2017       Mammogram current: yes  Last Mammogram:   Ma Screening Digital Bilateral    Result Date: 10/25/2019  Narrative: Examination: Bilateral digital screening mammography with computer aided detection. Comparison: Mammogram 2017, 2016, 2015. History/Family history: No symptoms, routine screening. BREAST DENSITY: Heterogeneously dense. COMMENTS: No significant change.     Ma Screening Digital Bilateral    Result Date: 2017  Narrative: Examination: Bilateral digital screening mammography with computer aided detection. Comparison: Mammogram 2016, 2015, 11/3/2011 History: No symptoms, routine screening. BREAST DENSITY: Heterogeneously dense. COMMENTS: No significant change.        Last Colonoscopy:  Results for orders placed or performed during the hospital encounter of 13   COLONOSCOPY   Result Value Ref Range    COLONOSCOPY       [facility], [place]   [address] [place] [address] [phone]     Endoscopy  Department  _______________________________________________________________________________  Patient Name: Roxie Vargas          Procedure Date: 4/1/2013 08:04:SS A4/P4     MRN: [id number]                       Account Number: [id number]                  YOB: 1960              Admit Type: Outpatient                      Age: 52                               [facility]                     Gender: Female                        Note Status: Finalized                      Attending MD: Tobias Merchant MD            Pause for the Cause: Pause for the cause   completed  _______________________________________________________________________________     Procedure:                Colonoscopy  Indications:              Screening for colorectal malignant neoplasm  Providers:                Tobias Merchant MD, Yoselyn Jalloh RN  Patient Profile:          52 year old  female with h/o anemia                             for [id number] colonoscopy  Referring MD:             Shannen King NP  Medicines:                Fentanyl 100 micrograms IV, Midazolam 2 mg IV  Complications:            No immediate complications  _______________________________________________________________________________  Procedure:                Pre-Anesthesia Assessment:                            - Prior to the procedure, a History and Physical                             was performed, and patient medications and                             allergies were reviewed. The patient is competent.                             The risks and benefits of the procedure and the                             sedation options and risks were discussed with the                             patient. All questions were answered and informed                             consent was obtained. Patient identification and                             proposed procedure were verified by the physician                             in the  pre-procedure area. Mental Status                             Examination: alert and oriented. Airway                             Examination: normal oropharyngeal airway and neck                             mobility. Respiratory Examination: clear to                             auscultation. CV Examination: normal. Prophylactic                             Antibiotics: The patient does not require                             prophylactic antibiotics. Prior Anticoagulants: The                             patient has taken no previous anticoagulant or                             antiplatelet agents. ASA Grade Assessment: II - A                             patient with mild systemic disease. After reviewing                             the risks and benefits, the patient was deemed in                             satisfactory condition to undergo the procedure.                             The anesthesia plan was to use moderate sedation /                             analgesia (conscious sedation). Immediately prior                             to administration of medications, the patient was                             re-assessed for adequacy to receive sedatives. The                             heart rate, respiratory rate, oxygen saturations,                             blood pressure, adequacy of pulmonary ventilation,                             and response to care were monitored throughout the                             procedure. The physical status of the patient was                             re-assessed after the procedure.                            After obtaining informed consent, the colonoscope                             was passed under direct vision. Throughout the                             procedure, the patient's blood pressure, pulse, and                             oxygen saturations were monitored continuously. The                             Colonoscope was introduced through the anus and                              advanced to the cecum, identified by appendiceal                             orifice & ileocecal valve. The colonoscopy was                             performed without difficulty. The patient tolerated                             the procedure well. The quality of the bowel                             preparation was excellent.                                                                                   Findings:       The perianal and digital rectal examinations were normal. The colon        (entire examined portion) appeared normal. The retroflexed view of the        distal rectum was normal and showed no anal or rectal abnormalities.                                                                                   Impression:               - The entire examined colon is normal.  Recommendation:           - Discharge patient to home (with escort).                            - Repeat colonoscopy in 10 years for screening                             purposes.                            - Return to referring physician as previously                             scheduled.                                                                                     Signed electronically by Mendoza Merchant MD  _____________  Tobias Merchant MD  Signed Date: 4/1/2013 10:04:SS A4/P4  Number of Addenda: 0  I was physically present for the entire viewing portion of the exam.  __________________________  Signature of teaching physician  B4c/D4c  Note Initiated On: 4/1/2013 08:04:SS A4/P4  Scope Withdrawal Time: 0 hours 0 minutes 0 seconds   Scope Withdrawal Time: 0 hours 0 minutes 0 seconds   Total Procedure Duration: 0 hours 0 minutes 0 seconds          HISTORY:  ascorbic acid (VITAMIN C) 1000 MG TABS, Take 1,000 mg by mouth daily.  atovaquone-proguanil (MALARONE) 250-100 MG per tablet, Take 1 tablet by mouth daily Start 2 days before travel and continue 7 days after return.  azaTHIOprine (IMURAN) 50 MG tablet,  Take 1 tablet (50 mg) by mouth daily  carboxymethylcellulose (CARBOXYMETHYLCELLULOSE SODIUM) 0.5 % SOLN ophthalmic solution, Place 1-2 drops into both eyes 3 times daily as needed for dry eyes  Cholecalciferol (VITAMIN D PO), Take  by mouth daily.  Ferrous Sulfate (IRON CR PO), Take 1 tablet by mouth daily.  ibuprofen (ADVIL,MOTRIN) 600 MG tablet, Take 600 mg by mouth every 6 hours as needed.  olopatadine (PATADAY) 0.2 % ophthalmic solution, Place 1 drop into both eyes daily    No current facility-administered medications on file prior to visit.     Allergies   Allergen Reactions     Shrimp Anaphylaxis     Aspirin Other (See Comments)     Watery eyes. Happened a long time ago.      Immunization History   Administered Date(s) Administered     HEPA 2008, 2008     HepA-Adult 2008     HepB 2008, 2008, 2008     HepB, Unspecified 1998, 1999, 1999     HepB-Adult 2008, 2008     Influenza (intradermal) 10/03/2011, 10/03/2012     Influenza Intranasal Vaccine 4 valent 10/05/2016     MMR 1991, 10/02/1991     Mantoux Tuberculin Skin Test 2001     Meningococcal (Menactra ) 11/15/2016     Pneumo Conj 13-V (2010&after) 11/15/2016     TD (ADULT, 7+) 10/02/2000     TDAP Vaccine (Adacel) 2008, 10/18/2011     TDAP Vaccine (Boostrix) 10/18/2011     Td (Adult), Adsorbed 1990     Tdap (Adacel,Boostrix) 2008     Typhoid IM 11/15/2016       OB History    Para Term  AB Living   1 0 0 0 1 1   SAB TAB Ectopic Multiple Live Births   0 1 0 0 0   Obstetric Comments   -- hospitalization for weakness      Past Medical History:   Diagnosis Date     Autoimmune hepatitis (H)      Colitis, ulcerative (H)      Fibroids      Heartburn      Iron deficiency anemia      Ulcer      Past Surgical History:   Procedure Laterality Date     COLONOSCOPY  2013    Procedure: COLONOSCOPY;;  Surgeon: Tobias Merchant MD;  Location:  GI     dilation &  romuloetaserenity         uterine fibroids removed[  2005     wisdom teeth[       Family History   Problem Relation Age of Onset     Asthma Sister      Peptic Ulcer Disease Sister         and 2 nephews     Hypertension Mother      Social History     Socioeconomic History     Marital status: Single     Spouse name: None     Number of children: None     Years of education: None     Highest education level: None   Occupational History     Occupation: Medical Records     Employer: [employer]   Social Needs     Financial resource strain: None     Food insecurity:     Worry: None     Inability: None     Transportation needs:     Medical: None     Non-medical: None   Tobacco Use     Smoking status: Never Smoker     Smokeless tobacco: Never Used   Substance and Sexual Activity     Alcohol use: No     Alcohol/week: 0.0 standard drinks     Drug use: No     Sexual activity: Not Currently   Lifestyle     Physical activity:     Days per week: None     Minutes per session: None     Stress: None   Relationships     Social connections:     Talks on phone: None     Gets together: None     Attends Yazdanism service: None     Active member of club or organization: None     Attends meetings of clubs or organizations: None     Relationship status: None     Intimate partner violence:     Fear of current or ex partner: None     Emotionally abused: None     Physically abused: None     Forced sexual activity: None   Other Topics Concern     Parent/sibling w/ CABG, MI or angioplasty before 65F 55M? Not Asked   Social History Narrative    Immigrated from [place] 1982.             How much exercise per week? Daily activities    How much calcium per day? supplements       How much caffeine per day? Tea 1     How much vitamin D per day? In supplements    Do you/your family wear seatbelts?  Yes    Do you/your family use safety helmets? Yes    Do you/your family use sunscreen? Yes    Do you/your family keep firearms in the home? No    Do  "you/your family have a smoke detector(s)? Yes            [facility]       ROS  [unfilled]  PHQ-9 SCORE 12/20/2019   PHQ-9 Total Score 0     IVANA-7 SCORE 12/20/2019   Total Score 0         EXAM:  Blood pressure 121/80, pulse 84, height 1.628 m (5' 4.09\"), weight 112.9 kg (249 lb), not currently breastfeeding. Body mass index is 42.62 kg/m .  General - pleasant female in no acute distress.  Skin - no suspicious lesions or rashes  EENT-  PERRLA, euthyroid with out palpable nodules  Neck - supple without lymphadenopathy.  Lungs - clear to auscultation bilaterally.  Heart - regular rate and rhythm without murmur.  Abdomen - soft, nontender, nondistended, no masses or organomegaly noted.  Musculoskeletal - no gross deformities.  Neurological - normal strength, sensation, and mental status.    Breast Exam:  Breast: Without visible skin changes. No dimpling or lesions seen.   Breasts supple, non-tender with palpation, no dominant mass, nodularity, or nipple discharge noted bilaterally. Axillary nodes negative.      Pelvic Exam:  EG/BUS: Normal genital architecture without lesions, erythema or abnormal secretions Bartholin's, Urethra, Brimhall Nizhoni's normal   Urethral meatus: normal   Urethra: no masses, tenderness, or scarring   Bladder: no masses or tenderness   Vagina: atrophic, thin, dry with absent  secretions  Cervix: Nulliparous, difficult to visualize in entirely and palpate due to scarring at fornix.  Uterus: exam findings compromised by body habitus  Adnexa: Not palpable secondary to body habitus        ASSESSMENT:  Encounter Diagnoses   Name Primary?     Screening for malignant neoplasm of cervix      Encounter for gynecological examination without abnormal finding      Uterine leiomyoma, unspecified location Yes    On immunosuppresive drug; will check pap more frequently.    PLAN:   Orders Placed This Encounter   Procedures     Pap Smear Exam [] Do Not Remove     Pap imaged thin layer screen with HPV " - recommended age 30 - 65 years (select HPV order below)     HPV High Risk Types DNA Cervical         Return to clinic in one year.  Follow-up as needed.

## 2019-12-21 ASSESSMENT — ANXIETY QUESTIONNAIRES: GAD7 TOTAL SCORE: 0

## 2019-12-26 LAB
COPATH REPORT: NORMAL
PAP: NORMAL

## 2019-12-30 LAB
FINAL DIAGNOSIS: NORMAL
HPV HR 12 DNA CVX QL NAA+PROBE: NEGATIVE
HPV16 DNA SPEC QL NAA+PROBE: NEGATIVE
HPV18 DNA SPEC QL NAA+PROBE: NEGATIVE
SPECIMEN DESCRIPTION: NORMAL
SPECIMEN SOURCE CVX/VAG CYTO: NORMAL

## 2020-04-16 PROBLEM — M17.0 OSTEOARTHRITIS OF BOTH KNEES, UNSPECIFIED OSTEOARTHRITIS TYPE: Status: RESOLVED | Noted: 2019-11-13 | Resolved: 2020-04-16

## 2020-04-16 NOTE — PROGRESS NOTES
Discharge Note    Progress reporting period is from Nov 13, 2019 to Nov 25, 2019.     Roxie failed to return for next follow up visit and current status is unknown.  Please see information below for last relevant information on current status.  Patient seen for Rxs Used: 2 visits.  SUBJECTIVE  Subjective changes noted by patient:  Subjective: Pt reports that knee pain is intermittent. Going upstairs continues to be difficult.   .  Current pain level is  .     Previous pain level was  Initial Pain level: 3/10.   Changes in function:  Yes (See Goal flowsheet attached for changes in current functional level)  Adverse reaction to treatment or activity: None    OBJECTIVE  Changes noted in objective findings: Objective: Difficulty with WB'ing exercises, pt fatigues quickly. Compensates with glutes     ASSESSMENT/PLAN  Diagnosis: Bilat knee OA   Updated problem list and treatment plan:   Pain - HEP  Decreased function - HEP  STG/LTGs have been met or progress has been made towards goals:  Yes, please see goal flowsheet for most current information  Assessment of Progress: current status is unknown.  Last current status: Assessment of progress: Pt is progressing as expected   Self Management Plans:  HEP  I have re-evaluated this patient and find that the nature, scope, duration and intensity of the therapy is appropriate for the medical condition of the patient.  Roxie continues to require the following intervention to meet STG and LTG's:  HEP.    Recommendations:  Discharge with current home program.  Patient to follow up with MD as needed.    Please refer to the daily flowsheet for treatment today, total treatment time and time spent performing 1:1 timed codes.

## 2020-06-12 ENCOUNTER — APPOINTMENT (OUTPATIENT)
Dept: LAB | Facility: CLINIC | Age: 60
End: 2020-06-12
Payer: COMMERCIAL

## 2020-06-12 PROCEDURE — 36415 COLL VENOUS BLD VENIPUNCTURE: CPT

## 2020-06-15 DIAGNOSIS — K75.4 AUTOIMMUNE HEPATITIS (H): Primary | ICD-10-CM

## 2020-06-26 DIAGNOSIS — K75.4 AUTOIMMUNE HEPATITIS (H): ICD-10-CM

## 2020-06-26 LAB
ALBUMIN SERPL-MCNC: 3.6 G/DL (ref 3.4–5)
ALP SERPL-CCNC: 81 U/L (ref 40–150)
ALT SERPL W P-5'-P-CCNC: 16 U/L (ref 0–50)
ANION GAP SERPL CALCULATED.3IONS-SCNC: 5 MMOL/L (ref 3–14)
AST SERPL W P-5'-P-CCNC: 9 U/L (ref 0–45)
BILIRUB DIRECT SERPL-MCNC: <0.1 MG/DL (ref 0–0.2)
BILIRUB SERPL-MCNC: 0.4 MG/DL (ref 0.2–1.3)
BUN SERPL-MCNC: 9 MG/DL (ref 7–30)
CALCIUM SERPL-MCNC: 8.8 MG/DL (ref 8.5–10.1)
CHLORIDE SERPL-SCNC: 107 MMOL/L (ref 94–109)
CO2 SERPL-SCNC: 26 MMOL/L (ref 20–32)
CREAT SERPL-MCNC: 0.67 MG/DL (ref 0.52–1.04)
ERYTHROCYTE [DISTWIDTH] IN BLOOD BY AUTOMATED COUNT: 15.1 % (ref 10–15)
GFR SERPL CREATININE-BSD FRML MDRD: >90 ML/MIN/{1.73_M2}
GLUCOSE SERPL-MCNC: 124 MG/DL (ref 70–99)
HCT VFR BLD AUTO: 37.1 % (ref 35–47)
HGB BLD-MCNC: 11.4 G/DL (ref 11.7–15.7)
INR PPP: 1.05 (ref 0.86–1.14)
MCH RBC QN AUTO: 26.8 PG (ref 26.5–33)
MCHC RBC AUTO-ENTMCNC: 30.7 G/DL (ref 31.5–36.5)
MCV RBC AUTO: 87 FL (ref 78–100)
PLATELET # BLD AUTO: 206 10E9/L (ref 150–450)
POTASSIUM SERPL-SCNC: 3.5 MMOL/L (ref 3.4–5.3)
PROT SERPL-MCNC: 8.1 G/DL (ref 6.8–8.8)
RBC # BLD AUTO: 4.25 10E12/L (ref 3.8–5.2)
SODIUM SERPL-SCNC: 138 MMOL/L (ref 133–144)
WBC # BLD AUTO: 6.5 10E9/L (ref 4–11)

## 2020-06-29 ENCOUNTER — VIRTUAL VISIT (OUTPATIENT)
Dept: GASTROENTEROLOGY | Facility: CLINIC | Age: 60
End: 2020-06-29
Attending: PHYSICIAN ASSISTANT
Payer: COMMERCIAL

## 2020-06-29 DIAGNOSIS — K75.4 AUTOIMMUNE HEPATITIS (H): Primary | ICD-10-CM

## 2020-06-29 RX ORDER — MULTIVIT WITH MINERALS/LUTEIN
1 TABLET ORAL DAILY
COMMUNITY

## 2020-06-29 ASSESSMENT — PAIN SCALES - GENERAL: PAINLEVEL: NO PAIN (0)

## 2020-06-29 NOTE — PROGRESS NOTES
"Roxie Vargas is a 60 year old female who is being evaluated via a billable telephone visit.      The patient has been notified of following:     \"This telephone visit will be conducted via a call between you and your physician/provider. We have found that certain health care needs can be provided without the need for a physical exam.  This service lets us provide the care you need with a short phone conversation.  If a prescription is necessary we can send it directly to your pharmacy.  If lab work is needed we can place an order for that and you can then stop by our lab to have the test done at a later time.    Telephone visits are billed at different rates depending on your insurance coverage. During this emergency period, for some insurers they may be billed the same as an in-person visit.  Please reach out to your insurance provider with any questions.    If during the course of the call the physician/provider feels a telephone visit is not appropriate, you will not be charged for this service.\"    Patient has given verbal consent for Telephone visit?  Yes    What phone number would you like to be contacted at? [phone]     [facility]    Phone call duration: 9 minutes    Ridge Sanchez PA-C          "

## 2020-06-29 NOTE — LETTER
2020         RE: Roxie Vargas  [address] [facility] [address]        Dear Colleague,    Thank you for referring your patient, Roxie Vargas, to the [facility] HEPATOLOGY. Please see a copy of my visit note below.    Hepatology Clinic note  Roxie Vargas   Date of Birth 1960  Date of Service 2020         Assessment/plan:   Roxie Vargas is a 60 year old female with history of autoimmune hepatitis who has been maintained on Azathioprine. She remains very compliant with her medications. Her transaminases are normal. Her liver function is normal and she does not show any biochemical or clinical signs of advanced fibrosis. A fibrosis scan in 2018 showed F0-F1, 5.8 kilopascals. She understands importance of continued follow up.     - Continue 50 mg Azathioprine  - BMP, Hepatic panel, CBC, INR in six months  - Follow up with PCP for overall health maintenance   - Follow-up in clinic in one year or sooner as needed    Ridge Sanchez PA-C   [facility]    -----------------------------------------------------       HPI:   Roxie Vargas is a 60 year old female  presenting for the follow-up.    Autoimmune Hepatitis   -Diagnosed: 2015  -Prior biopsy: 2106, bridging fibrosis   Fibrosis Scan: 2018, F0-F1 , 5.8 kilopascals   -Prior treatments:   - Prednisone for induction at diagnosis,   - Azothioprine since 2016  Previous IG on 2016    Patient was last seen by me on 2019. No recent hospitalizations or ER vists. No new medications.      She states she is doing well and has no particular complaints. Her appetite has been good. She states she has gained between 10-20 lbs over the past six months due to being less active.    Patient denies jaundice, lower extremity edema, abdominal distension or confusion.  Patient also denies melena, hematochezia or hematemesis. Patient denies weight loss, fevers, sweats or chills.    She does not  drink alcohol. She has been able to work from home.     Medical hx Surgical hx   Past Medical History:   Diagnosis Date     Autoimmune hepatitis (H)      Colitis, ulcerative (H)      Fibroids      Heartburn      Iron deficiency anemia      Ulcer     Past Surgical History:   Procedure Laterality Date     COLONOSCOPY  4/1/2013    Procedure: COLONOSCOPY;;  Surgeon: Tobias Merchant MD;  Location:  GI     dilation & curretage         uterine fibroids removed[  2005     wisdom teeth[                   Medications:     Current Outpatient Medications   Medication     ascorbic acid (VITAMIN C) 1000 MG TABS     atovaquone-proguanil (MALARONE) 250-100 MG per tablet     azaTHIOprine (IMURAN) 50 MG tablet     carboxymethylcellulose (CARBOXYMETHYLCELLULOSE SODIUM) 0.5 % SOLN ophthalmic solution     Cholecalciferol (VITAMIN D PO)     Ferrous Sulfate (IRON CR PO)     ibuprofen (ADVIL,MOTRIN) 600 MG tablet     olopatadine (PATADAY) 0.2 % ophthalmic solution     No current facility-administered medications for this visit.             Allergies:     Allergies   Allergen Reactions     Shrimp Anaphylaxis     Aspirin Other (See Comments)     Watery eyes. Happened a long time ago.             Review of Systems:   10 points ROS was obtained and highlighted in the HPI, otherwise negative.          Physical Exam:   PSYCH: Alert and oriented times 3; coherent speech, normal   rate and volume, able to articulate logical thoughts, able   to abstract reason, no tangential thoughts, no hallucinations   or delusions  Her affect is normal  RESP: No cough, no audible wheezing, able to talk in full sentences  Remainder of exam unable to be completed due to telephone visits           Data:   Reviewed in person and significant for:    Lab Results   Component Value Date     06/26/2020      Lab Results   Component Value Date    POTASSIUM 3.5 06/26/2020     Lab Results   Component Value Date    CHLORIDE 107 06/26/2020     Lab Results   Component  Value Date    CO2 26 06/26/2020     Lab Results   Component Value Date    BUN 9 06/26/2020     Lab Results   Component Value Date    CR 0.67 06/26/2020       Lab Results   Component Value Date    WBC 6.5 06/26/2020     Lab Results   Component Value Date    HGB 11.4 06/26/2020     Lab Results   Component Value Date    HCT 37.1 06/26/2020     Lab Results   Component Value Date    MCV 87 06/26/2020     Lab Results   Component Value Date     06/26/2020       Lab Results   Component Value Date    AST 9 06/26/2020     Lab Results   Component Value Date    ALT 16 06/26/2020     No results found for: BILICONJ   Lab Results   Component Value Date    BILITOTAL 0.4 06/26/2020       Lab Results   Component Value Date    ALBUMIN 3.6 06/26/2020     Lab Results   Component Value Date    PROTTOTAL 8.1 06/26/2020      Lab Results   Component Value Date    ALKPHOS 81 06/26/2020       Lab Results   Component Value Date    INR 1.05 06/26/2020             Roxie Vargas is a 60 year old female who is being evaluated via a billable telephone visit.          Phone call duration: 9 minutes    Ridge Sanchez PA-C

## 2020-06-29 NOTE — PROGRESS NOTES
Hepatology Clinic note  Roxie Vargas   Date of Birth 1960  Date of Service 2020         Assessment/plan:   Roxie Vargas is a 60 year old female with history of autoimmune hepatitis who has been maintained on Azathioprine. She remains very compliant with her medications. Her transaminases are normal. Her liver function is normal and she does not show any biochemical or clinical signs of advanced fibrosis. A fibrosis scan in 2018 showed F0-F1, 5.8 kilopascals. She understands importance of continued follow up.     - Continue 50 mg Azathioprine  - BMP, Hepatic panel, CBC, INR in six months  - Follow up with PCP for overall health maintenance   - Follow-up in clinic in one year or sooner as needed    Ridge Sanchez PA-C   [facility] Hepatology clinic    -----------------------------------------------------       HPI:   Roxie Vargas is a 60 year old female  presenting for the follow-up.    Autoimmune Hepatitis   -Diagnosed: 2015  -Prior biopsy: 2106, bridging fibrosis   Fibrosis Scan: 2018, F0-F1 , 5.8 kilopascals   -Prior treatments:   - Prednisone for induction at diagnosis,   - Azothioprine since 2016  Previous IG on 2016    Patient was last seen by me on 2019. No recent hospitalizations or ER vists. No new medications.      She states she is doing well and has no particular complaints. Her appetite has been good. She states she has gained between 10-20 lbs over the past six months due to being less active.    Patient denies jaundice, lower extremity edema, abdominal distension or confusion.  Patient also denies melena, hematochezia or hematemesis. Patient denies weight loss, fevers, sweats or chills.    She does not drink alcohol. She has been able to work from home.     Medical hx Surgical hx   Past Medical History:   Diagnosis Date     Autoimmune hepatitis (H)      Colitis, ulcerative (H)      Fibroids      Heartburn      Iron deficiency anemia      Ulcer      Past Surgical History:   Procedure Laterality Date     COLONOSCOPY  4/1/2013    Procedure: COLONOSCOPY;;  Surgeon: Tobias Merchant MD;  Location: [facility]     dilation & curretage         uterine fibroids removed[  2005     wisdom teeth[                   Medications:     Current Outpatient Medications   Medication     ascorbic acid (VITAMIN C) 1000 MG TABS     atovaquone-proguanil (MALARONE) 250-100 MG per tablet     azaTHIOprine (IMURAN) 50 MG tablet     carboxymethylcellulose (CARBOXYMETHYLCELLULOSE SODIUM) 0.5 % SOLN ophthalmic solution     Cholecalciferol (VITAMIN D PO)     Ferrous Sulfate (IRON CR PO)     ibuprofen (ADVIL,MOTRIN) 600 MG tablet     olopatadine (PATADAY) 0.2 % ophthalmic solution     No current facility-administered medications for this visit.             Allergies:     Allergies   Allergen Reactions     Shrimp Anaphylaxis     Aspirin Other (See Comments)     Watery eyes. Happened a long time ago.             Review of Systems:   10 points ROS was obtained and highlighted in the HPI, otherwise negative.          Physical Exam:   PSYCH: Alert and oriented times 3; coherent speech, normal   rate and volume, able to articulate logical thoughts, able   to abstract reason, no tangential thoughts, no hallucinations   or delusions  Her affect is normal  RESP: No cough, no audible wheezing, able to talk in full sentences  Remainder of exam unable to be completed due to telephone visits           Data:   Reviewed in person and significant for:    Lab Results   Component Value Date     06/26/2020      Lab Results   Component Value Date    POTASSIUM 3.5 06/26/2020     Lab Results   Component Value Date    CHLORIDE 107 06/26/2020     Lab Results   Component Value Date    CO2 26 06/26/2020     Lab Results   Component Value Date    BUN 9 06/26/2020     Lab Results   Component Value Date    CR 0.67 06/26/2020       Lab Results   Component Value Date    WBC 6.5 06/26/2020     Lab Results   Component Value  Date    HGB 11.4 06/26/2020     Lab Results   Component Value Date    HCT 37.1 06/26/2020     Lab Results   Component Value Date    MCV 87 06/26/2020     Lab Results   Component Value Date     06/26/2020       Lab Results   Component Value Date    AST 9 06/26/2020     Lab Results   Component Value Date    ALT 16 06/26/2020     No results found for: BILICONJ   Lab Results   Component Value Date    BILITOTAL 0.4 06/26/2020       Lab Results   Component Value Date    ALBUMIN 3.6 06/26/2020     Lab Results   Component Value Date    PROTTOTAL 8.1 06/26/2020      Lab Results   Component Value Date    ALKPHOS 81 06/26/2020       Lab Results   Component Value Date    INR 1.05 06/26/2020

## 2020-09-17 ENCOUNTER — OFFICE VISIT - HEALTHEAST (OUTPATIENT)
Dept: FAMILY MEDICINE | Facility: CLINIC | Age: 60
End: 2020-09-17

## 2020-09-17 DIAGNOSIS — Z12.4 SCREENING FOR CERVICAL CANCER: ICD-10-CM

## 2020-09-17 DIAGNOSIS — Z12.11 SCREEN FOR COLON CANCER: ICD-10-CM

## 2020-09-17 DIAGNOSIS — Z23 NEED FOR TDAP VACCINATION: ICD-10-CM

## 2020-09-17 DIAGNOSIS — Z13.1 ENCOUNTER FOR SCREENING FOR DIABETES MELLITUS: ICD-10-CM

## 2020-09-17 DIAGNOSIS — M79.669 PAIN IN SHIN, UNSPECIFIED LATERALITY: ICD-10-CM

## 2020-09-17 DIAGNOSIS — K75.4 AUTOIMMUNE HEPATITIS (H): ICD-10-CM

## 2020-09-17 DIAGNOSIS — L81.9 HYPERPIGMENTATION OF SKIN: ICD-10-CM

## 2020-09-17 DIAGNOSIS — Z13.220 ENCOUNTER FOR SCREENING FOR LIPOID DISORDERS: ICD-10-CM

## 2020-09-17 DIAGNOSIS — Z12.11 ENCOUNTER FOR COLORECTAL CANCER SCREENING: ICD-10-CM

## 2020-09-17 DIAGNOSIS — Z12.12 ENCOUNTER FOR COLORECTAL CANCER SCREENING: ICD-10-CM

## 2020-09-17 DIAGNOSIS — Z12.31 VISIT FOR SCREENING MAMMOGRAM: ICD-10-CM

## 2020-09-17 LAB
CHOLEST SERPL-MCNC: 194 MG/DL
FASTING STATUS PATIENT QL REPORTED: NO
HBA1C MFR BLD: 5.7 %
HDLC SERPL-MCNC: 55 MG/DL
LDLC SERPL CALC-MCNC: 121 MG/DL
TRIGL SERPL-MCNC: 92 MG/DL

## 2020-09-17 ASSESSMENT — ANXIETY QUESTIONNAIRES
IF YOU CHECKED OFF ANY PROBLEMS ON THIS QUESTIONNAIRE, HOW DIFFICULT HAVE THESE PROBLEMS MADE IT FOR YOU TO DO YOUR WORK, TAKE CARE OF THINGS AT HOME, OR GET ALONG WITH OTHER PEOPLE: NOT DIFFICULT AT ALL
6. BECOMING EASILY ANNOYED OR IRRITABLE: NOT AT ALL
1. FEELING NERVOUS, ANXIOUS, OR ON EDGE: NOT AT ALL
5. BEING SO RESTLESS THAT IT IS HARD TO SIT STILL: NOT AT ALL
2. NOT BEING ABLE TO STOP OR CONTROL WORRYING: NOT AT ALL
7. FEELING AFRAID AS IF SOMETHING AWFUL MIGHT HAPPEN: NOT AT ALL
3. WORRYING TOO MUCH ABOUT DIFFERENT THINGS: NOT AT ALL
4. TROUBLE RELAXING: NOT AT ALL
GAD7 TOTAL SCORE: 0

## 2020-09-17 ASSESSMENT — PATIENT HEALTH QUESTIONNAIRE - PHQ9: SUM OF ALL RESPONSES TO PHQ QUESTIONS 1-9: 0

## 2020-09-17 ASSESSMENT — MIFFLIN-ST. JEOR: SCORE: 1687.42

## 2020-09-18 ENCOUNTER — COMMUNICATION - HEALTHEAST (OUTPATIENT)
Dept: FAMILY MEDICINE | Facility: CLINIC | Age: 60
End: 2020-09-18

## 2020-09-18 LAB
ANA SER QL: 2.1 U
HPV SOURCE: NORMAL
HUMAN PAPILLOMA VIRUS 16 DNA: NEGATIVE
HUMAN PAPILLOMA VIRUS 18 DNA: NEGATIVE
HUMAN PAPILLOMA VIRUS FINAL DIAGNOSIS: NORMAL
HUMAN PAPILLOMA VIRUS OTHER HR: NEGATIVE
SPECIMEN DESCRIPTION: NORMAL

## 2020-09-25 LAB
BKR LAB AP ABNORMAL BLEEDING: NO
BKR LAB AP BIRTH CONTROL/HORMONES: NORMAL
BKR LAB AP CERVICAL APPEARANCE: NORMAL
BKR LAB AP GYN ADEQUACY: NORMAL
BKR LAB AP GYN INTERPRETATION: NORMAL
BKR LAB AP HPV REFLEX: NORMAL
BKR LAB AP LMP: NORMAL
BKR LAB AP PATIENT STATUS: NORMAL
BKR LAB AP PREVIOUS ABNORMAL: NO
BKR LAB AP PREVIOUS NORMAL: YES
HIGH RISK?: NO
PATH REPORT.COMMENTS IMP SPEC: NORMAL
RESULT FLAG (HE HISTORICAL CONVERSION): NORMAL

## 2020-09-28 ENCOUNTER — COMMUNICATION - HEALTHEAST (OUTPATIENT)
Dept: FAMILY MEDICINE | Facility: CLINIC | Age: 60
End: 2020-09-28

## 2020-09-29 ENCOUNTER — COMMUNICATION - HEALTHEAST (OUTPATIENT)
Dept: FAMILY MEDICINE | Facility: CLINIC | Age: 60
End: 2020-09-29

## 2020-11-20 ENCOUNTER — OFFICE VISIT - HEALTHEAST (OUTPATIENT)
Dept: FAMILY MEDICINE | Facility: CLINIC | Age: 60
End: 2020-11-20

## 2020-11-20 DIAGNOSIS — R51.9 TEMPORAL HEADACHE: ICD-10-CM

## 2020-11-20 DIAGNOSIS — K75.4 AUTOIMMUNE HEPATITIS (H): ICD-10-CM

## 2020-11-20 DIAGNOSIS — J34.89 SINUS PRESSURE: ICD-10-CM

## 2020-11-20 DIAGNOSIS — H04.123 DRY EYES: ICD-10-CM

## 2020-11-20 DIAGNOSIS — R20.9 SENSATION OF COLD IN LOWER EXTREMITY: ICD-10-CM

## 2020-11-22 ENCOUNTER — HEALTH MAINTENANCE LETTER (OUTPATIENT)
Age: 60
End: 2020-11-22

## 2020-12-04 ENCOUNTER — HOSPITAL ENCOUNTER (OUTPATIENT)
Dept: MAMMOGRAPHY | Facility: CLINIC | Age: 60
Discharge: HOME | End: 2020-12-04
Attending: FAMILY MEDICINE

## 2020-12-04 DIAGNOSIS — Z12.31 VISIT FOR SCREENING MAMMOGRAM: ICD-10-CM

## 2021-01-12 ENCOUNTER — OFFICE VISIT - HEALTHEAST (OUTPATIENT)
Dept: RHEUMATOLOGY | Facility: CLINIC | Age: 61
End: 2021-01-12

## 2021-01-12 DIAGNOSIS — E66.01 MORBID OBESITY (H): ICD-10-CM

## 2021-01-12 DIAGNOSIS — K75.4 AUTOIMMUNE HEPATITIS (H): ICD-10-CM

## 2021-01-12 DIAGNOSIS — M25.561 BILATERAL CHRONIC KNEE PAIN: ICD-10-CM

## 2021-01-12 DIAGNOSIS — G89.29 BILATERAL CHRONIC KNEE PAIN: ICD-10-CM

## 2021-01-12 DIAGNOSIS — M25.562 BILATERAL CHRONIC KNEE PAIN: ICD-10-CM

## 2021-02-03 ENCOUNTER — OFFICE VISIT - HEALTHEAST (OUTPATIENT)
Dept: FAMILY MEDICINE | Facility: CLINIC | Age: 61
End: 2021-02-03

## 2021-02-03 DIAGNOSIS — L71.0 PERIORAL DERMATITIS: ICD-10-CM

## 2021-02-03 DIAGNOSIS — R10.11 RUQ ABDOMINAL PAIN: ICD-10-CM

## 2021-02-03 DIAGNOSIS — K21.00 GASTROESOPHAGEAL REFLUX DISEASE WITH ESOPHAGITIS WITHOUT HEMORRHAGE: ICD-10-CM

## 2021-02-03 DIAGNOSIS — K75.4 AUTOIMMUNE HEPATITIS (H): ICD-10-CM

## 2021-02-03 ASSESSMENT — PATIENT HEALTH QUESTIONNAIRE - PHQ9: SUM OF ALL RESPONSES TO PHQ QUESTIONS 1-9: 0

## 2021-02-03 ASSESSMENT — ANXIETY QUESTIONNAIRES
GAD7 TOTAL SCORE: 0
4. TROUBLE RELAXING: NOT AT ALL
3. WORRYING TOO MUCH ABOUT DIFFERENT THINGS: NOT AT ALL
7. FEELING AFRAID AS IF SOMETHING AWFUL MIGHT HAPPEN: NOT AT ALL
2. NOT BEING ABLE TO STOP OR CONTROL WORRYING: NOT AT ALL
1. FEELING NERVOUS, ANXIOUS, OR ON EDGE: NOT AT ALL
IF YOU CHECKED OFF ANY PROBLEMS ON THIS QUESTIONNAIRE, HOW DIFFICULT HAVE THESE PROBLEMS MADE IT FOR YOU TO DO YOUR WORK, TAKE CARE OF THINGS AT HOME, OR GET ALONG WITH OTHER PEOPLE: NOT DIFFICULT AT ALL
6. BECOMING EASILY ANNOYED OR IRRITABLE: NOT AT ALL
5. BEING SO RESTLESS THAT IT IS HARD TO SIT STILL: NOT AT ALL

## 2021-02-13 ENCOUNTER — HEALTH MAINTENANCE LETTER (OUTPATIENT)
Age: 61
End: 2021-02-13

## 2021-02-18 ENCOUNTER — OFFICE VISIT - HEALTHEAST (OUTPATIENT)
Dept: VASCULAR SURGERY | Facility: CLINIC | Age: 61
End: 2021-02-18

## 2021-02-18 ENCOUNTER — HOSPITAL ENCOUNTER (OUTPATIENT)
Dept: ULTRASOUND IMAGING | Facility: CLINIC | Age: 61
Discharge: HOME | End: 2021-02-18
Attending: FAMILY MEDICINE

## 2021-02-18 DIAGNOSIS — R10.11 RUQ ABDOMINAL PAIN: ICD-10-CM

## 2021-02-18 DIAGNOSIS — R20.9 SENSATION OF COLD IN LOWER EXTREMITY: ICD-10-CM

## 2021-02-18 ASSESSMENT — MIFFLIN-ST. JEOR: SCORE: 1696.27

## 2021-02-19 LAB — HCV AB SERPL QL IA: NEGATIVE

## 2021-02-22 ENCOUNTER — COMMUNICATION - HEALTHEAST (OUTPATIENT)
Dept: SURGERY | Facility: CLINIC | Age: 61
End: 2021-02-22

## 2021-02-24 LAB — CRYOGLOB SER QL: NEGATIVE %

## 2021-03-01 ENCOUNTER — HOSPITAL ENCOUNTER (OUTPATIENT)
Dept: MRI IMAGING | Facility: CLINIC | Age: 61
Discharge: HOME | End: 2021-03-01
Attending: INTERNAL MEDICINE

## 2021-03-01 DIAGNOSIS — R20.9 SENSATION OF COLD IN LOWER EXTREMITY: ICD-10-CM

## 2021-03-02 ENCOUNTER — COMMUNICATION - HEALTHEAST (OUTPATIENT)
Dept: FAMILY MEDICINE | Facility: CLINIC | Age: 61
End: 2021-03-02

## 2021-03-02 DIAGNOSIS — K21.00 GASTROESOPHAGEAL REFLUX DISEASE WITH ESOPHAGITIS WITHOUT HEMORRHAGE: ICD-10-CM

## 2021-04-25 ENCOUNTER — COMMUNICATION - HEALTHEAST (OUTPATIENT)
Dept: FAMILY MEDICINE | Facility: CLINIC | Age: 61
End: 2021-04-25

## 2021-04-25 DIAGNOSIS — J34.89 SINUS PRESSURE: ICD-10-CM

## 2021-04-29 ENCOUNTER — OFFICE VISIT - HEALTHEAST (OUTPATIENT)
Dept: VASCULAR SURGERY | Facility: CLINIC | Age: 61
End: 2021-04-29

## 2021-04-29 ENCOUNTER — RECORDS - HEALTHEAST (OUTPATIENT)
Dept: VASCULAR ULTRASOUND | Facility: CLINIC | Age: 61
End: 2021-04-29

## 2021-04-29 DIAGNOSIS — K75.4 AUTOIMMUNE HEPATITIS (H): ICD-10-CM

## 2021-04-29 DIAGNOSIS — R20.9 UNSPECIFIED DISTURBANCES OF SKIN SENSATION: ICD-10-CM

## 2021-05-25 ENCOUNTER — RECORDS - HEALTHEAST (OUTPATIENT)
Dept: ADMINISTRATIVE | Facility: CLINIC | Age: 61
End: 2021-05-25

## 2021-05-26 ENCOUNTER — RECORDS - HEALTHEAST (OUTPATIENT)
Dept: ADMINISTRATIVE | Facility: CLINIC | Age: 61
End: 2021-05-26

## 2021-05-27 VITALS
DIASTOLIC BLOOD PRESSURE: 64 MMHG | SYSTOLIC BLOOD PRESSURE: 118 MMHG | HEART RATE: 84 BPM | TEMPERATURE: 98.9 F | RESPIRATION RATE: 17 BRPM

## 2021-05-27 ASSESSMENT — PATIENT HEALTH QUESTIONNAIRE - PHQ9
SUM OF ALL RESPONSES TO PHQ QUESTIONS 1-9: 0
SUM OF ALL RESPONSES TO PHQ QUESTIONS 1-9: 0

## 2021-05-28 ASSESSMENT — ANXIETY QUESTIONNAIRES
GAD7 TOTAL SCORE: 0
GAD7 TOTAL SCORE: 0

## 2021-05-29 ENCOUNTER — RECORDS - HEALTHEAST (OUTPATIENT)
Dept: ADMINISTRATIVE | Facility: CLINIC | Age: 61
End: 2021-05-29

## 2021-05-30 ENCOUNTER — RECORDS - HEALTHEAST (OUTPATIENT)
Dept: ADMINISTRATIVE | Facility: CLINIC | Age: 61
End: 2021-05-30

## 2021-06-04 VITALS
HEIGHT: 63 IN | DIASTOLIC BLOOD PRESSURE: 70 MMHG | BODY MASS INDEX: 44.42 KG/M2 | HEART RATE: 84 BPM | SYSTOLIC BLOOD PRESSURE: 130 MMHG | WEIGHT: 250.7 LBS

## 2021-06-05 VITALS
DIASTOLIC BLOOD PRESSURE: 70 MMHG | HEART RATE: 76 BPM | BODY MASS INDEX: 45 KG/M2 | HEIGHT: 63 IN | RESPIRATION RATE: 16 BRPM | SYSTOLIC BLOOD PRESSURE: 130 MMHG | TEMPERATURE: 98.4 F | WEIGHT: 254 LBS

## 2021-06-05 VITALS
HEART RATE: 88 BPM | DIASTOLIC BLOOD PRESSURE: 70 MMHG | WEIGHT: 253.3 LBS | BODY MASS INDEX: 44.33 KG/M2 | SYSTOLIC BLOOD PRESSURE: 130 MMHG

## 2021-06-05 VITALS
TEMPERATURE: 98.3 F | HEART RATE: 76 BPM | BODY MASS INDEX: 44.52 KG/M2 | WEIGHT: 254.4 LBS | DIASTOLIC BLOOD PRESSURE: 88 MMHG | SYSTOLIC BLOOD PRESSURE: 132 MMHG

## 2021-06-05 VITALS
SYSTOLIC BLOOD PRESSURE: 138 MMHG | HEART RATE: 92 BPM | DIASTOLIC BLOOD PRESSURE: 80 MMHG | BODY MASS INDEX: 44.82 KG/M2 | WEIGHT: 256.1 LBS

## 2021-06-11 NOTE — TELEPHONE ENCOUNTER
FYI - Status Update  Who is Calling: Patient  Update: Patient states she has had her coloscopy done on 04/012013 at the [facility] .  Normal findings and repeat in 10 years.   Okay to leave a detailed message?:  No return call needed

## 2021-06-11 NOTE — TELEPHONE ENCOUNTER
See note in regards to colonoscopy done last 2013 and to have done in 10 years. Dayami Rashid LPN

## 2021-06-11 NOTE — PROGRESS NOTES
Split Billing    In the event there are problems/issues requiring significant additional work up, services may qualify to be 'split'.  When split billing, as with any E&M service, providers must document the following items relating to the problem warranting the split bill in order to support the level of service billed.    History of Present Illness    Review of Systems    Examination    Medical Decision Making    Examples of significant work    New problems with workup (new arm pain)    New RX related to a new problem    Change in old problems (HTN worsening)    A minor symptom or problem that is encountered that does not require additional workup or the performance of the key components should not be split billed, bill the preventive code only.    Split Billing    In the event there are problems/issues requiring significant additional work up, services may qualify to be 'split'.  When split billing, as with any E&M service, providers must document the following items relating to the problem warranting the split bill in order to support the level of service billed.    History of Present Illness    Review of Systems    Examination    Medical Decision Making    Examples of significant work    New problems with workup (new arm pain)    New RX related to a new problem    Change in old problems (HTN worsening)    A minor symptom or problem that is encountered that does not require additional workup or the performance of the key components should not be split billed, bill the preventive code only.

## 2021-06-13 NOTE — PROGRESS NOTES
Assessment/plan   Roxie Vargas is a 60 y.o. female who is establish patient to my practice here with   Chief Complaint   Patient presents with     Headache     ongoing since Sept. 2020, denies any vision changes or head injuries      Follow-up     from visit in September         Roxie was seen today for headache and follow-up.    Diagnoses and all orders for this visit:    Dry eyes    Autoimmune hepatitis (H)    Sinus pressure  -     loratadine-pseudoephedrine (CLARITIN-D 12 HOUR) 5-120 mg Tb12; Take 1 tablet by mouth 2 (two) times a day.    Sensation of cold in lower extremity  Continue to have cold sensation in the mid shin area.  Patient will be seeing a rheumatologist for this concern and also be placed in the referral for the vascular medicine  -     Ambulatory referral to Vascular Medicine    Temporal headache  Place the referral for the rheumatologist in which she has upcoming appointment so I like to wait on any lab work done was thinking of doing ESR and CRP  As most likely her headache is more sinus headache will start with the Claritin-D twice a day for next 1 week or so and maybe add a nasal spray also complete eye exam is necessary she does have appointment for dilated eye exam by next week  -     Sedimentation Rate; Future  -     C-Reactive Protein(CRP); Future          Subjective:      HPI: Roxie Vargas is a 60 y.o. female is here for.  .hpihe  Headache: Patient presents with headache. Symptoms began about 3 months ago. Generally, the headaches last about several hours and occur intermittent, frequent, daily, several times per week. The headache do not seem to be related to any time of the day. The headaches are usually dull and throbbing and are bilateral, temporal, frontal, retro-orbital in location.  The patient rates her most severe headaches a 4 on a scale from 1 to 10. Recently, the headaches are increasing in frequency. School attendance or other daily activities are affected by the  headaches. Precipitating factors include stress, weather changes and allergies as feel benadryl help . The headaches are usually not preceded by an aura. Associated neurologic symptoms which are present include: none . The patient denies decreased physical activity, depression, dizziness, loss of balance, muscle weakness, numbness of extremities, speech difficulties, vision problems, vomiting in the early morning and worsening school/work performance. Home treatment has included resting, sleeping and benadryl with fair improvement. Other history includes: nothing pertinent. Family history includes no known family members with significant headaches.          I have personally reviewed the patient's allergies, medications, past medical history, family history, social history, rooming notes and problem list in detail and updated the patient record as necessary.      No past medical history on file.  No past surgical history on file.  Shellfish derived and Aspirin  Current Outpatient Medications   Medication Sig Dispense Refill     azaTHIOprine (IMURAN) 50 mg tablet Take 50 mg by mouth daily.       cholecalciferol, vitamin D3, (VITAMIN D3 ORAL) Take 2,000 Units by mouth daily.       ferrous sulfate (IRON ORAL) Take 1 tablet by mouth daily.       hypromellose (SYSTANE GEL OPHT) Apply to eye.       MULTIVITAMIN ORAL Take 1 tablet by mouth daily.       olopatadine 0.2 % Drop Apply 1 drop to eye daily.       loratadine-pseudoephedrine (CLARITIN-D 12 HOUR) 5-120 mg Tb12 Take 1 tablet by mouth 2 (two) times a day. 30 tablet 0     No current facility-administered medications for this visit.      No family history on file.    Patient Active Problem List   Diagnosis     Autoimmune hepatitis (H)     Morbid obesity (H)     Anemia       Review of Systems   12 point comprehensive review of systems was negative except as noted and HPI     Social History     Social History Narrative     Not on file       Objective:     Vitals:     11/20/20 1044 11/20/20 1059   BP: 150/76 130/70   Pulse: 96 88   Weight: (!) 253 lb 4.8 oz (114.9 kg)        Physical Exam:   Physical Exam:  General Appearance:  Appears comfortable, Alert, cooperative, no distress,   Head: Normocephalic, without obvious abnormality, atraumatic  Eyes: PERRL, conjunctiva/corneas clear, EOM's intact, both eyes             Nose: Nares normal, no drainage hypertrophic nasal turbinates  Throat: Lips, mucosa, and tongue normal; teeth and gums normal  Neck: Supple, symmetrical, trachea midline, no adenopathy;                      Lungs: Clear to auscultation bilaterally, respirations unlabored  Heart: Regular rate and rhythm, S1 and S2 normal, no murmur, rubs or gallop  Extremities: Extremities normal, atraumatic, no cyanosis or edema  Pulses: DP pulses are 1-2+ bilat.    Skin: no rashes or lesions  Neurologic: normal and equal strength bilat in upper and lower extremities        This note has been dictated using voice recognition software. Any grammatical or context distortions are unintentional and inherent to the software  Camille Chopra MD

## 2021-06-14 NOTE — PROGRESS NOTES
"This document was created using a software with less than 100% fidelity, at times resulting in unintended, even erroneous syntax and grammar.  The reader is advised to keep this under consideration while reviewing, interpreting this note.    ASSESSMENT AND PLAN:  Roxie Vargas 60 y.o. female is seen here on 01/12/21 for evaluation of bilateral chronic knee pain, in the background of morbid obesity, autoimmune hepatitis for which she is on Imuran.  I have a detailed discussion with her.  Management principles of osteoarthritis were reviewed.  We discussed various options.  We were both in agreement that at this time the most important thing she can do is to lose at least some weight, strengthen her core muscles, take acetaminophen, the indications for corticosteroid injections were reviewed, will recommend x-rays of the knees.  She will follow-up here in 6 months or sooner.  Diagnoses and all orders for this visit:    Bilateral chronic knee pain  -     XR Knees Bilateral 1 Or 2 VWS; Future; Expected date: 01/12/2021  -     XR Knees Bilateral 1 Or 2 VWS    Morbid obesity (H)    Autoimmune hepatitis (H)      HISTORY OF PRESENTING ILLNESS:  Roxie Vargas, 60 y.o., female is here for evaluation of painful knee joints area.  At this is gone on for several years.  She recalls being seen at orthopedics.  She was told that \"the bone is rubbing against the bone.  She was sent to physical therapy.  She was offered corticosteroid injection she declined.  She has noted the pain is only when she goes up and down the steps, or if she were to stand up from a sitting position like after tying her shoes.  She does not get pain on ambulation.  She does not get nocturnal pain.  She is comfortable at rest.  She has not seen swelling.  No other joint areas amongst the upper and lower extremity joints trouble her.  There is no neck or back pain.  She has not had trauma.  She is recently from [place].  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  She has important comorbidities besides morbid obesity, she has had to have autoimmune hepatitis.  Further historical information and ADL limitations as noted in the multidimensional health assessment questionnaire attached in the EMR.    ALLERGIES:Shellfish derived and Aspirin    PAST MEDICAL/ACTIVE PROBLEMS/MEDICATION/ FAMILY HISTORY/SOCIAL DATA:  The patient has a family history of  No past medical history on file.  Social History     Tobacco Use   Smoking Status Never Smoker   Smokeless Tobacco Never Used     Patient Active Problem List   Diagnosis     Autoimmune hepatitis (H)     Morbid obesity (H)     Anemia     Current Outpatient Medications   Medication Sig Dispense Refill     azaTHIOprine (IMURAN) 50 mg tablet Take 50 mg by mouth daily.       cholecalciferol, vitamin D3, (VITAMIN D3 ORAL) Take 2,000 Units by mouth daily.       ferrous sulfate (IRON ORAL) Take 1 tablet by mouth daily.       hypromellose (SYSTANE GEL OPHT) Apply to eye.       loratadine-pseudoephedrine (CLARITIN-D 12 HOUR) 5-120 mg Tb12 Take 1 tablet by mouth 2 (two) times a day. 30 tablet 0     MULTIVITAMIN ORAL Take 1 tablet by mouth daily.       olopatadine 0.2 % Drop Apply 1 drop to eye daily.       No current facility-administered medications for this visit.        COMPREHENSIVE EXAMINATION:  Vitals:    01/12/21 1535   BP: 138/80   Pulse: 92   Weight: (!) 256 lb 1.6 oz (116.2 kg)     A well appearing alert oriented female. Vital data as noted above. Her eyes without inflammation/scleromalacia. ENTwithout oral mucositis, thrush, nasal deformity, external ear redness, deformity. Her neck is without lymphadenopathy and supple. Skin examined for heliotrope, malar area eruption, lupus pernio, periungual erythema, sclerodactyly, papules, erythema nodosum, purpura, nail pitting, onycholysis, and obvious psoriasis lesion. Neurological examination shows normal alertness, speech, facial symmetry, tone and power in upper and lower  extremities. The joint examination is performed for swelling, tenderness, warmth, erythema, and range of motion in the following joints: DIPs, PIPs, MCPs, wrists, first CMC's, elbows, shoulders, hips, knees, ankles, feet; spine for range of motion and paraspinal muscles for tenderness. The salient  findings are: She has tenderness of the knee joint line.  She has nearly full flexion, her gait is normal.  There is no detectable effusion.  She does not have synovitis in any of the palpable joints of upper extremities.    LAB / IMAGING DATA:  No results found for: ALT, ALBUMIN, CREATININE    No results found for: WBC, HGB, PLT    No results found for: MARIANA, RF, SEDRATE

## 2021-06-14 NOTE — PROGRESS NOTES
Assessment/plan   Roxie Vargas is a 60 y.o. female who is establish patient to my practice here with   Chief Complaint   Patient presents with     Abdominal Pain     Feeling sick in the morning after eating     Allergy Testing        Roxie was seen today for abdominal pain and allergy testing.    Diagnoses and all orders for this visit:    Autoimmune hepatitis (H)  Continue Imuran is doing well  -     azaTHIOprine (IMURAN) 50 mg tablet; Take 1 tablet (50 mg total) by mouth daily.    RUQ abdominal pain  -We will go ahead and order the right upper quadrant ultrasound to rule out at least a gallstone and look at the liver anatomy patient already established care with the hepatologist at [facility] so she can go ahead and make that appointment       US Abdomen Limited; Future    Gastroesophageal reflux disease with esophagitis without hemorrhage  -     omeprazole (PRILOSEC) 20 MG capsule; Take 1 capsule (20 mg total) by mouth 2 (two) times a day before meals.  -     sucralfate (CARAFATE) 1 gram tablet; Take 1 tablet (1 g total) by mouth 4 (four) times a day.    BMI 40.0-44.9, adult (H)  Medical weight management discussed with the patient but for now she will work with a dietitian and then we can bring her back in the clinic to discuss the weight management plan  -     Amb Referral to Bariatric Dietician  Wt Readings from Last 3 Encounters:   02/03/21 (!) 254 lb 6.4 oz (115.4 kg)   01/12/21 (!) 256 lb 1.6 oz (116.2 kg)   11/20/20 (!) 253 lb 4.8 oz (114.9 kg)     Perioral dermatitis  -     Ambulatory referral to Dermatology            Subjective:      HPI: Roxie Vargas is a 60 y.o. female who with known history of autoimmune hepatitis was followed at [facility] currently on Imuran 50 mg daily need refill on that and also we have her followed at rheumatology clinic for her chronic knee pain just wanted to rule out any rheumatic process which finally diagnosed as osteoarthritis most of the time her pain is going up and  down the steps so more sound like a patellofemoral syndrome it was recommended to lose weight and be more physically active if needed might be able to do a cortisone shot which she will think about it  New concern today is right upper quadrant abdominal pain which she wondering if it is coming from her liver    Abdominal Pain: Patient complains of abdominal pain for last 6 month off and on . The pain is described as colicky and pressure-like, and is 5/10 in intensity. Pain is located in the RUQ without radiation. Onset was 6 month ago. Symptoms have been gradually worsening since. Aggravating factors: coffee, eating and fatty foods.  Alleviating factors: H2 blockers and not sure as havn't keep track of anything so far. Associated symptoms: anorexia and belching. The patient denies constipation, diarrhea, fever, flatus, frequency, hematuria and melena.    Dermatitis : Patient also struggling with rash and hyperpigmented spots on her face wondering if she can see a dermatologist currently trying to avoid any harsh soaps and chemicals using just mild moisturizers not using any medicated ointments         I have personally reviewed the patient's allergies, medications, past medical history, family history, social history, rooming notes and problem list in detail and updated the patient record as necessary.      No past medical history on file.  No past surgical history on file.  Shellfish derived and Aspirin  Current Outpatient Medications   Medication Sig Dispense Refill     azaTHIOprine (IMURAN) 50 mg tablet Take 1 tablet (50 mg total) by mouth daily. 90 tablet 1     cholecalciferol, vitamin D3, (VITAMIN D3 ORAL) Take 2,000 Units by mouth daily.       ferrous sulfate (IRON ORAL) Take 1 tablet by mouth daily.       hypromellose (SYSTANE GEL OPHT) Apply to eye.       MULTIVITAMIN ORAL Take 1 tablet by mouth daily.       olopatadine 0.2 % Drop Apply 1 drop to eye daily.       loratadine-pseudoephedrine (CLARITIN-D 12  HOUR) 5-120 mg Tb12 Take 1 tablet by mouth 2 (two) times a day. 30 tablet 0     omeprazole (PRILOSEC) 20 MG capsule Take 1 capsule (20 mg total) by mouth 2 (two) times a day before meals. 60 capsule 0     sucralfate (CARAFATE) 1 gram tablet Take 1 tablet (1 g total) by mouth 4 (four) times a day. 120 tablet 1     No current facility-administered medications for this visit.      No family history on file.    Patient Active Problem List   Diagnosis     Autoimmune hepatitis (H)     Morbid obesity (H)     Anemia     Bilateral chronic knee pain     BMI 40.0-44.9, adult (H)     Gastroesophageal reflux disease with esophagitis without hemorrhage       Review of Systems   12 point comprehensive review of systems was negative except as noted and HPI     Social History     Social History Narrative     Not on file       Objective:     Vitals:    02/03/21 0917   BP: 132/88   Pulse: 76   Temp: 98.3  F (36.8  C)   TempSrc: Oral   Weight: (!) 254 lb 6.4 oz (115.4 kg)       Physical Exam:   Physical Exam:  General Appearance:  Appears comfortable, Alert, cooperative, no distress,   Head: Normocephalic, without obvious abnormality, atraumatic  Eyes: PERRL, conjunctiva/corneas clear, EOM's intact, both eyes             Nose: Nares normal, no drainage   Throat: Lips, mucosa, and tongue normal; teeth and gums normal  Neck: Supple, symmetrical, trachea midline, no adenopathy;                      Lungs: Clear to auscultation bilaterally, respirations unlabored  Heart: Regular rate and rhythm, S1 and S2 normal, no murmur, rubs or gallop  Abdomen: Soft, , mild right upper quadrant tenderness bowel sounds active all four quadrants,   no masses, no organomegaly  Extremities: Extremities normal, atraumatic, no cyanosis or edema  Pulses: DP pulses are 1-2+ bilat.    Skin: no rashes or lesions  Neurologic: normal and equal strength bilat in upper and lower extremities      35minutes spent on the day of encounter doing chart review, history and  exam, documentation, and further activities as noted.     This note has been dictated using voice recognition software. Any grammatical or context distortions are unintentional and inherent to the software  Camille Chopra MD

## 2021-06-15 NOTE — TELEPHONE ENCOUNTER
Refill Approved    Rx renewed per Medication Renewal Policy. Medication was last renewed on 2/3/21.    Nilson Guerrero, [facility] Triage/Med Refill 3/2/2021     Requested Prescriptions   Pending Prescriptions Disp Refills     omeprazole (PRILOSEC) 20 MG capsule [Pharmacy Med Name: OMEPRAZOLE 20MG CAPSULES] 60 capsule 0     Sig: TAKE 1 CAPSULE(20 MG) BY MOUTH TWICE DAILY BEFORE MEALS       GI Medications Refill Protocol Passed - 3/2/2021  3:15 AM        Passed - PCP or prescribing provider visit in last 12 or next 3 months.     Last office visit with prescriber/PCP: 2/3/2021 Camille Chopra MD OR same dept: 2/3/2021 Camille Chopra MD OR same specialty: 2/3/2021 Camille Chopra MD  Last physical: Visit date not found Last MTM visit: Visit date not found   Next visit within 3 mo: Visit date not found  Next physical within 3 mo: Visit date not found  Prescriber OR PCP: Camille Chopra MD  Last diagnosis associated with med order: 1. Gastroesophageal reflux disease with esophagitis without hemorrhage  - omeprazole (PRILOSEC) 20 MG capsule [Pharmacy Med Name: OMEPRAZOLE 20MG CAPSULES]; TAKE 1 CAPSULE(20 MG) BY MOUTH TWICE DAILY BEFORE MEALS  Dispense: 60 capsule; Refill: 0    If protocol passes may refill for 12 months if within 3 months of last provider visit (or a total of 15 months).

## 2021-06-15 NOTE — PROGRESS NOTES
Vascular Medicine Progress note    Dr Yaniv Bueno MD, Vascular Medicine      Roxie Vargas    Medical Record #:  [id number]    Date of Service: 02/18/2021     Date last seen:  Visit date not found    PRIMARY CARE PROVIDER: Camille Chopra MD      IMPRESSION/PLAN: Patient is here because of cold sensation on the left lower extremity that the patient has been experiencing for the past 2 to 3 years  This sensation she is having prior to her diagnosis with autoimmune hepatitis  Patient denies any history of viral hepatitis in the past, patient sensation is all through the year does not get affected by warm or cold weather, this sensation she gets the sensation but if she touches the area of the skin affected the skin is warm  This area represents a -L4 with probable S1 dermatome  The sensation is only felt with if she sits for a long time but if she stands up or keeps on moving or lying in bed she does not get that sensation  In regards to her autoimmune hepatitis lab work done prior shows no activities  Patient denies any history of fall any history of trauma to the back and no prominent history of peripheral neuritis    DISPOSITION:  1-check for cryoglobulins, hep C antibodies,  2-SOLIS resting and post exercise with toe pressures  3-MRI lumbosacral spine with no contrast  4-we will see the patient once test results are available if no cause can be identified patient should be further investigated for peripheral neuropathy/radiculopathy      ______________________________________________________________________    Subjective:    ALLERGIES:  Shellfish derived and Aspirin    MEDS:    Current Outpatient Medications:      azaTHIOprine (IMURAN) 50 mg tablet, Take 1 tablet (50 mg total) by mouth daily., Disp: 90 tablet, Rfl: 1     cholecalciferol, vitamin D3, (VITAMIN D3 ORAL), Take 2,000 Units by mouth daily., Disp: , Rfl:      ferrous sulfate (IRON ORAL), Take 1 tablet by mouth daily., Disp: , Rfl:      hypromellose  "(SYSTANE GEL OPHT), Apply to eye., Disp: , Rfl:      loratadine-pseudoephedrine (CLARITIN-D 12 HOUR) 5-120 mg Tb12, Take 1 tablet by mouth 2 (two) times a day., Disp: 30 tablet, Rfl: 0     MULTIVITAMIN ORAL, Take 1 tablet by mouth daily., Disp: , Rfl:      olopatadine 0.2 % Drop, Apply 1 drop to eye daily., Disp: , Rfl:      omeprazole (PRILOSEC) 20 MG capsule, Take 1 capsule (20 mg total) by mouth 2 (two) times a day before meals., Disp: 60 capsule, Rfl: 0     sucralfate (CARAFATE) 1 gram tablet, Take 1 tablet (1 g total) by mouth 4 (four) times a day., Disp: 120 tablet, Rfl: 1    REVIEW OF SYSTEMS:    A 12 point ROS was reviewed and except for what is listed in the HPI above, all others are negative    Objective:    PE:  /70   Pulse 76   Temp 98.4  F (36.9  C)   Resp 16   Ht 5' 3\" (1.6 m)   Wt (!) 254 lb (115.2 kg)   LMP  (LMP Unknown) Comment: doctor induced menopause when taking out her fibroids   BMI 44.99 kg/m    Wt Readings from Last 1 Encounters:   02/18/21 (!) 254 lb (115.2 kg)     Body mass index is 44.99 kg/m .    EXAM:  GENERAL: This is a well-developed 60 y.o. female who appears her stated age  EYES: Grossly normal.  MOUTH: Buccal mucosa normal   CARDIAC:  Normal S1 and S2, no Murmur  CHEST/LUNG:  Clear lung fields bilaterally  GASTROINTESINAL (ABDOMEN):Soft, non-tender, B/S present, no pulsatile mass     MUSCULOSKELETAL: Grossly normal and both lower extremities are intact.  HEME/LYMPH: No lymphedema  NEUROLOGIC: Focally intact, Alert and oriented x 3.   PSYCH: appropriate affect  INTEGUMENT: No open lesions or ulcers  Pulse Exam: Verbal DP and PT pulses bilaterally with triphasic signal  Circumferential measures:  No flowsheet data found.        DIAGNOSTIC STUDIES:     Us Abdomen Limited    Result Date: 2/18/2021  EXAM: US ABDOMEN LIMITED LOCATION: [facility] DATE/TIME: 2/18/2021 7:07 AM INDICATION: autoimmune hepatitis / c/o RUQ pain COMPARISON: None. TECHNIQUE: " Limited abdominal ultrasound. FINDINGS: GALLBLADDER: Normal. No gallstones, wall thickening, or pericholecystic fluid. Negative sonographic Ramires's sign. BILE DUCTS: No biliary dilatation. The common duct measures 2 mm. LIVER: Normal parenchyma with smooth contour. No focal mass. RIGHT KIDNEY: No hydronephrosis. PANCREAS: The visualized portions are normal. No ascites.     Normal limited abdominal ultrasound.     Xr Knees Bilateral 1 Or 2 Vws    Result Date: 1/12/2021  EXAM DATE:         01/12/2021 EXAM: X-RAY KNEES BILATERAL, 1-2 VIEWS LOCATION: [facility] DATE/TIME: 1/12/2021 4:30 PM INDICATION: Pain in right knee. Pain in left knee. COMPARISON: None. IMPRESSION: RIGHT KNEE: Mild medial compartment narrowing. Mild patellofemoral compartment narrowing and osteophytosis. There is no evidence of fracture, effusion or calcified intra-articular body. LEFT KNEE: Moderate medial compartment narrowing. Small medial compartment osteophytes. Mild patellofemoral compartment narrowing and osteophytosis. There is no evidence of fracture, effusion or calcified intra-articular body.     I personally reviewed the following imaging today and those on care everywhere, if indicated    LABS:      No results found for: NA, K, CL, BUN, CREATININE, GLUCOSE, HGB, PLT, BNP, INR    Total time spent 25   (15,25,35) minutes face to face with patient with more than 50% time spent in counseling and coordination of care.    Yaniv Bueno MD  VASCULAR MEDICINE

## 2021-06-15 NOTE — PROGRESS NOTES
"consult. Continue to have cold sensation in the mid shin area.  Patient will be seeing a rheumatologist for this concern and also be placed in the referral for the vascular medicine    Pt has gained over 10 lbs in last 6 months.    Pt has had cold sensation in shin area for 2-3 years ago \"I am always cold though\"    Pt states left leg is worse. No issues to feet.    Pt states rhumatology only saw pt knee concern      "

## 2021-06-16 NOTE — TELEPHONE ENCOUNTER
RN cannot approve Refill Request    RN can NOT refill this medication med is not covered by policy/route to provider. Last office visit: 2/3/2021 Camille Chopra MD Last Physical: Visit date not found Last MTM visit: Visit date not found Last visit same specialty: 2/3/2021 Camille Chopra MD.  Next visit within 3 mo: Visit date not found  Next physical within 3 mo: Visit date not found      Cherry Sanchez, [facility] Triage/Med Refill 4/25/2021    Requested Prescriptions   Pending Prescriptions Disp Refills     WAL-ITIN D 12 HOUR 5-120 mg Tb12 [Pharmacy Med Name: WAL-ITIN D 12HR TABLETS] 30 tablet 0     Sig: TAKE 1 TABLET BY MOUTH TWICE DAILY       There is no refill protocol information for this order

## 2021-06-17 NOTE — PROGRESS NOTES
Vascular Medicine Progress note    Dr Yaniv Bueno MD, Vascular Medicine      Roxie Vargas    Medical Record #:  [id number]    Date of Service: 04/29/2021     Date last seen:  2/18/2021    PRIMARY CARE PROVIDER: Camille Chopra MD      IMPRESSION/PLAN: Patient is here for follow-up on lab results and SOLIS resting and post exercise    Lab results did not show much of abnormalities, SOLIS considered negative for exercise-induced ischemia    DISPOSITION: Continue with vascular risk factors modifications      ______________________________________________________________________    Subjective:    ALLERGIES:  Shellfish derived and Aspirin    MEDS:    Current Outpatient Medications:      azaTHIOprine (IMURAN) 50 mg tablet, Take 1 tablet (50 mg total) by mouth daily., Disp: 90 tablet, Rfl: 1     cholecalciferol, vitamin D3, (VITAMIN D3 ORAL), Take 2,000 Units by mouth daily., Disp: , Rfl:      ferrous sulfate (IRON ORAL), Take 1 tablet by mouth daily., Disp: , Rfl:      hypromellose (SYSTANE GEL OPHT), Apply to eye., Disp: , Rfl:      ketoconazole (NIZORAL) 2 % cream, APPLY EXTERNALLY TO FACE EVERY NIGHT AT BEDTIME AS NEEDED, Disp: , Rfl:      MULTIVITAMIN ORAL, Take 1 tablet by mouth daily., Disp: , Rfl:      olopatadine 0.2 % Drop, Apply 1 drop to eye daily., Disp: , Rfl:      omeprazole (PRILOSEC) 20 MG capsule, TAKE 1 CAPSULE(20 MG) BY MOUTH TWICE DAILY BEFORE MEALS, Disp: 180 capsule, Rfl: 3     sucralfate (CARAFATE) 1 gram tablet, Take 1 tablet (1 g total) by mouth 4 (four) times a day., Disp: 120 tablet, Rfl: 1     triamcinolone (KENALOG) 0.1 % ointment, , Disp: , Rfl:      WAL-ITIN D 12 HOUR 5-120 mg Tb12, TAKE 1 TABLET BY MOUTH TWICE DAILY, Disp: 30 tablet, Rfl: 0    REVIEW OF SYSTEMS:    A 12 point ROS was reviewed and except for what is listed in the HPI above, all others are negative    Objective:    PE:  /64   Pulse 84   Temp 98.9  F (37.2  C)   Resp 17   LMP  (LMP Unknown) Comment: doctor induced  menopause when taking out her fibroids   Wt Readings from Last 1 Encounters:   02/18/21 (!) 254 lb (115.2 kg)     There is no height or weight on file to calculate BMI.    EXAM:  GENERAL: This is a well-developed 60 y.o. female who appears her stated age  EYES: Grossly normal.  MOUTH: Buccal mucosa normal   CARDIAC:  Not assessed  CHEST/LUNG:  Not Assessed  GASTROINTESINAL (ABDOMEN):Not Assessed     MUSCULOSKELETAL: Grossly normal and both lower extremities are intact.  HEME/LYMPH: No lymphedema  NEUROLOGIC: Focally intact, Alert and oriented x 3.   PSYCH: appropriate affect  INTEGUMENT: No open lesions or ulcers  Circumferential measures:  No flowsheet data found.        DIAGNOSTIC STUDIES:     Us Ankle Brachial Indices Pre And Post Exercise    Result Date: 4/29/2021  BILATERAL Ankle Brachial Index with Exercise (04/29/21) Indication: SURVEILLANCE SOLIS'S: LEFT COLD SHIN X 2-3 YEARS. WHEN SITTING DOWN FOR LONG TIME IT GETS COLD. WHEN MOVING SHE DOESN'T FEEL IT.  Previous: NONE History: MORBID OBESITY Resting SOLIS's          Right: mmHg Index     Brachial: 143  Ankle-(PT): 165 1.15 Ankle-(DP): 168 1.17           Digit: 117 0.82               Left: mmHg Index     Brachial: 141  Ankle-(PT): 155 1.08 Ankle-(DP): 172 1.20           Digit: 132 0.92 Resting ankle-brachial index of 1.17 on the right. Toe Pressures of 117 mmHg and TBI of 0.82.  Resting ankle-brachial index of 1.20 on the left. Toe Pressures of 132 mmHg and TBI of 0.92. WAVEFORMS: The right dorsalis pedis and posterior tibial arteries show triphasic waveforms. The left dorsalis pedis and posterior tibial arteries show triphasic waveforms. Exercise Testing: MEYER HUGGINS TESTING: Time (min) Grade Level % Rate MPH Level of Pain (1-10) Area of Pain 10 8 1.5  0  STOPPED AT 9:30 / shortness of breath  Post Exercise Pressures: Location: Rest 1 minute Right Ankle  151 Left Ankle  173 Right Brachial 143 146 Right SOLIS: 1.17 1.03 Left SOLIS: 1.20 1.18  Comments: Impression: 1. RIGHT LOWER EXTREMITY: SOLIS is Normal with an SOLIS of 1.17. and Normal toe pressures of 117 mmHg. 2. LEFT LOWER EXTREMITY: SOLIS is Normal with an SOLIS of 1.2. and Normal toe pressures of 132 mmHg. 3. MEYER HUGGINS TESTING: Patient able to walk for a total of 9 minutes 30 seconds with SOB starting at 9 in  30. Drop in SOLIS was trivial and not significant Reference: Wound classification Grade SOLIS Ankle Systolic Pressure Toe Pressures 0 > 0.80 > 100 mmHg > 60 mmHg 1 0.6 - 0.79 70 - 100 mmHg 40 - 59 mmHg 2 0.4 - 0.59 50-70 mmHg 30 - 39 mmHg 3 < 0.39 < 50 mmHg < 30 mmHg Digit Pressures DBI Disease Category > 0.70 Normal < 0.70 Abnormal > 30 mmHg Potential wound healing < 30 mmHg Impaired wound healing Ankle Brachial Pressures SOLIS Disease Category > 1.3  Likely vessel calcification with monophasic waveforms, non-diagnostic 0.95-1.30 Normal with multiphasic waveforms 0.50-0.95 Single level disease 0.30-0.50 Multilevel disease < 0.30 Critical limb ischema Post Exercise Stress Testing Normal Post Stress SOLIS > resting SOLIS, or Ankle systolic pressure falls < 20% below resting SOLIS with return to baseline in 3 minutes Abnormal  Ankle systolic pressure falls > 20% below resting SOLIS and /or without return to baseline within 3 minutes Volume Plethysmography Recording (VPR) at all levels Normal Sharp systolic peak, fast upstroke, prominent dicrotic notch in wave Mild Sharp systolic peak, fast upstroke, absent dicrotic notch in wave Moderate Flattened systolic peak, slowed upstroke, absent dicrotic notch in wave Severe Low-amplitude wave with = upslope and down slope Occluded Flat Line Multiple Level Segmental Pressures  Normal Abnormal Upper Thigh > 1.2 pressure indices, <30 mmHg between lateral and horizontal cuffs < 0.8 pressure indices suggest proximal occlusion, 0.8-1.2 pressure indices suggest inflow disease, > 30 mmHg between lateral and horizontal cuffs  Lower Thigh < 30 mmHg between lateral and  horizontal cuffs > 30 mmHg between lateral and horizontal cuffs Upper Calf < 30 mmHg between lateral and horizontal cuffs > 30 mmHg between lateral and horizontal cuffs Note: As limb diameter increases, pressure measurements also increase.    I personally reviewed the following imaging today and those on care everywhere, if indicated    LABS:      No results found for: NA, K, CL, BUN, CREATININE, GLUCOSE, HGB, PLT, BNP, INR    Total time spent 20 (15,25,35) minutes face to face with patient with more than 50% time spent in counseling and coordination of care.    Yaniv Bueno MD  VASCULAR MEDICINE

## 2021-06-17 NOTE — PROGRESS NOTES
US before. 2 month f/u cold shin. MRI at [facility] 3/1 and lab 2/18 f/u.    Pt states no changes since she has seen Dr. Bueno last.     Pt states Saturday had a headache and dizzy. Pt had dizziness the whole day. Pt states headache was only on one side of her head and she went to pharmacy to get wualtind for allergies and sinuses and started it Sunday. Pt had headache on left side of head. Headache lasted until Tuesday.    Pt wanting hemoglobin checked.

## 2021-06-18 NOTE — PATIENT INSTRUCTIONS - HE
Patient Instructions by Valarie Del Castillo RN at 2/18/2021  2:00 PM     Author: Valarie Del Castillo RN Service: -- Author Type: Registered Nurse    Filed: 2/18/2021  2:34 PM Encounter Date: 2/18/2021 Status: Signed    : Valarie Del Castillo RN (Registered Nurse)       Ankle-Brachial Index (SOLIS) or Physiologic Test    Description  An ankle-brachial index test is relatively pain free. Blood pressure cuffs of various sizes are placed on your thigh, calf, foot and toes.  Similar to having your blood pressure checked with an arm cuff, as the technician inflates the cuffs, they progressively tighten and are then quickly released.  You may feel some discomfort, but generally for less than 60 seconds for each measurement. You will be asked to remove your socks and shoes and possibly your pants or shorts. Gowns will be provided. It usually takes about 30-60 minutes.   Depending on the initial readings and patient symptoms, you may be asked to perform a light walk on a treadmill.  The technician will apply ultrasound gel, usually warmed for your comfort, to your ankles and wrists. Through the gel, the technician will use a small hand-held device that emits sound waves.  Risks  There are typically no side effects or complications associated with a physiologic study.  How to Prepare  Eat and take medications as usual.  There is no preparation required for an ankle-brachial index (SOLIS) or physiologic exam.  What Can I Expect After the Test?  The technician will send the ultrasound images to your vascular surgeon for evaluation. Typically, a report is available in 2-3 days. If anything critical is found, it is standard practice to notify the vascular surgeon immediately.  Reference: [email or web address].[email or web address]/patient-resources/vascular-tests  Patient Education     Magnetic Resonance Imaging (MRI)    Magnetic resonance imaging (MRI) is a test that lets your doctor see detailed pictures of the inside of your body. MRI combines the use of strong  magnets and radio waves to form an MRI image.  How do I get ready for an MRI?    Follow any directions you are given for not eating or drinking before the test.    Ask your provider if you should stop taking any medicine before the test.    Follow your normal daily routine unless your provider tells you otherwise.    You'll be asked to remove your watch, jewelry, hearing aids, credit cards, pens, pocket knives, eyeglasses, and other metal objects.    You may be asked to remove your makeup. Makeup may contain some metal.    Most MRI tests take 30 to 60 minutes. Depending on the type of MRI you are having, the test may take longer. Give yourself extra time to check in.    What happens during an MRI?    You may be asked to wear a hospital gown.    You may be given earplugs to wear if you need them.    You may be injected with a special dye (contrast) that improves the MRI image.     Youll lie down on a platform that slides into the magnet.    Tell your healthcare provider and the technologist if you:    Have ever had an imaging test such as MRI or CT with contrast dye    Are allergic to contrast dye, iodine, shellfish, or any medicines    Have a serious health problem. This includes diabetes or kidney disease, or a liver transplant.    Are pregnant or may be pregnant, or are breastfeeding    Have any implanted device or metal clips or pins in your body  What happens after an MRI?    You can get back to normal activities right away. If you were given contrast, it will pass naturally through your body within a day. You may be told to drink more water or other fluids during this time.     Your doctor will discuss the test results with you during a follow-up appointment or over the phone.     Date Last Reviewed: 6/1/2017 2000-2019 [employer]. [address] [place] [address], [place] [address]. All rights reserved. This information is not intended as a substitute for professional medical care. Always follow your  healthcare professional's instructions.

## 2021-06-18 NOTE — PATIENT INSTRUCTIONS - HE
Patient Instructions by Camille Chopra MD at 9/17/2020 12:20 PM     Author: Camille Chopra MD Service: -- Author Type: Physician    Filed: 9/17/2020  3:21 PM Encounter Date: 9/17/2020 Status: Addendum    : Camille Chopra MD (Physician)    Related Notes: Original Note by Camille Chopra MD (Physician) filed at 9/17/2020  3:21 PM           Preventing Skin Cancer     Use sunscreen of SPF 30 or greater. Apply liberally.   Relaxing in the sun may feel good. But it isnt good for your skin. In fact, the suns harmful rays are the major cause of skin cancer. This is a serious disease that can be life-threatening. People of all ages, races, and backgrounds are at risk.  Skin cancer is the most common cancer in the [place] But in most cases, it can be prevented.  Your role in prevention  You can act today to help prevent skin cancer. Start by avoiding the suns UV (ultraviolet) rays. And dont use tanning beds or lamps. They are no safer than the sun. Taking these steps can help keep you from getting skin cancer. It can also help prevent wrinkles and other aging effects caused by the sun. Make sure your children also follow these safeguards. Now is the time to start taking steps to prevent skin cancer.  When you are outdoors  Protect your skin when you go out during the day. Take safety steps whenever you go out to eat, run errands by car or on foot, or do any outdoor activity. There isnt just one easy way to protect your skin. Its best to follow all of these steps:    Wear tightly woven clothing that covers your skin. Put on a wide-brimmed hat to protect your face, ears, and scalp.    Watch the clock. Try to stay out of the sun between 10 a.m. and 4 p.m. That's when the sun's rays are strongest.    Head for the shade or create your own. Use an umbrella when sitting or strolling.    Know that the suns rays can reflect off sand, water, and snow. This can harm your skin. Take extra care when you are near reflective surfaces.    Keep  "in mind that even when the weather is hazy or cloudy, your skin can be exposed to strong UV rays.    Shield your skin with sunscreen. Also use sunscreen on your childrens skin. Keep babies younger than 6 months old out of the sun.  Tips for using sunscreen  To help prevent skin cancer, choose the right sunscreen and use it correctly. Try these tips:    Choose a sunscreen that has an SPF (sun protection factor) of at least 30. Also choose a sunscreen labeled \"broad spectrum. This will protect you from both UVA and UVB (ultraviolet A and B) rays.    If one brand irritates your skin, try another, such as one without fragrance.    Use a water-resistant sunscreen if you swim or sweat.    Use at least 1 ounce of sunscreen to cover exposed areas. This is enough to fill a shot glass. You might need to adjust the amount depending on your body size.    Put the sunscreen on dry skin about 15 minutes before going outdoors. This gives it time to soak in.    Reapply sunscreen every 2 hours. If youre active, do this more often.    Cover any sun-exposed skin, from your face to your feet. Dont forget your scalp, ears, and lips.    Know that while sunscreen helps protect you, it isnt enough. Sunscreens extend the length of time you can be outdoors before your skin starts to get red. But they don't give you total protection. Using sunscreen doesn't mean you can stay out in the sun for an unlimited time. Your skin cells are still being damaged. You should also wear protective clothing. And try to stay out of the sun as much as you can, especially from 10 a.m. to 4 p.m.  Date Last Reviewed: 7/1/2019 2000-2019 [employer]. [address] [place] [address], [place] [address]. All rights reserved. This information is not intended as a substitute for professional medical care. Always follow your healthcare professional's instructions.        Patient Education     Prevention Guidelines, Women Ages 50 to 64  Screening tests and " vaccines are an important part of managing your health. A screening test is done to find possible disorders or diseases in people who don't have any symptoms. The goal is to find a disease early so lifestyle changes can be made and you can be watched more closely to reduce the risk of disease, or to detect it early enough to treat it most effectively. Screening tests are not considered diagnostic, but are used to determine if more testing is needed. Health counseling is essential, too. Below are guidelines for these, for women ages 50 to 64. Talk with your healthcare provider to make sure youre up to date on what you need.  Screening Who needs it How often   Type 2 diabetes or prediabetes All women beginning at age 45 and women without symptoms at any age who are overweight or obese and have 1 or more additional risk factors for diabetes. At  least every 3 years   Type 2 diabetes or prediabetes All women diagnosed with gestational diabetes Lifelong testing every 3 years   Type 2 diabetes All women with prediabetes Every year   Alcohol misuse All women in this age group At routine exams   Blood pressure All women in this age group Yearly checkup if your blood pressure is normal  Normal blood pressure is less than 120/80 mm Hg  If your blood pressure reading is higher than normal, follow the advice of your healthcare provider   Breast cancer All women at average risk in this age group Yearly mammogram should be done until age 54. At age 55, you can switch to every other year or choose to continue yearly.  All women should know the possible benefits and risks of breast cancer screening with mammograms.   Cervical cancer All women in this age group, except women who have had a complete hysterectomy Pap test every 3 years or Pap test with human papillomavirus (HPV) test every 5 years   Chlamydia Women at increased risk for infection At routine exams   Colorectal cancer All women at average risk in this age group Multiple  tests are available and are used at different times. Possible tests include:    Flexible sigmoidoscopy every 5 years, or    Colonoscopy every 10 years, or    CT colonography (virtual colonoscopy) every 5 years, or    Yearly fecal occult blood test, or    Yearly fecal immunochemical test every year, or    Stool DNA test, every 3 years  If you choose a test other than a colonoscopy and have an abnormal test result, you will need to follow up with a colonoscopy. Screening advice varies among expert groups. Talk with your healthcare provider about which tests are best for you.  Some people should be screened using a different schedule because of their personal or family health history. Talk with your healthcare provider about your health history.   Depression All women in this age group At routine exams   Gonorrhea Sexually active women at increased risk for infection At routine exams   Hepatitis C Anyone at increased risk; 1 time for those born between 1945 and 1965 At routine exams   High cholesterol or triglycerides All women in this age group who are at risk for coronary artery disease At least every 5 years   HIV All women At routine exams   Lung cancer Adults age 55 to 80 who have smoked Yearly screening in smokers with 30 pack-year history of smoking or who quit within 15 years   Obesity All women in this age group At routine exams   Osteoporosis Women who are postmenopausal Ask your healthcare provider   Syphilis Women at increased risk for infection - talk with your healthcare provider At routine exams   Tuberculosis Women at increased risk for infection - talk with your healthcare provider Ask your healthcare provider   Vision All women in this age group Ask your healthcare provider   Vaccine Who needs it How often   Chickenpox (varicella) All women in this age group who have no record of this infection or vaccine 2 doses; the second dose should be given at least 4 weeks after the first dose   Hepatitis A  Women at increased risk for infection - talk with your healthcare provider 2 doses given at least 6 months apart   Hepatitis B Women at increased risk for infection - talk with your healthcare provider 3 doses over 6 months; second dose should be given 1 month after the first dose; the third dose should be given at least 2 months after the second dose and at least 4 months after the first dose   Haemophilus influenzae Type B (HIB) Women at increased risk for infection - talk with your healthcare provider 1 to 3 doses   Influenza (flu) All women in this age group Once a year   Measles, mumps, rubella (MMR) Women in this age group through their late 50s who have no record of these infections or vaccines 1 dose   Meningococcal Women at increased risk for infection - talk with your healthcare provider 1 or more doses   Pneumococcal conjugate vaccine (PCV13) and pneumococcal polysaccharide vaccine (PPSV23) Women at increased risk for infection - talk with your healthcare provider PCV13: 1 dose ages 19 to 65 (protects against 13 types of pneumococcal bacteria)  PPSV23: 1 to 2 doses through age 64, or 1 dose at 65 or older (protects against 23 types of pneumococcal bacteria)   Tetanus/diphtheria/pertussis (Td/Tdap) booster All women in this age group Td every 10 years, or a 1-time dose of Tdap instead of a Td booster after age 18, then Td every 10 years   Zoster All women ages 60 and older 1 dose   Counseling Who needs it How often   BRCA gene mutation testing for breast and ovarian cancer susceptibility Women with increased risk for having gene mutation When your risk is known   Breast cancer and chemoprevention Women at high risk for breast cancer When your risk is known   Diet and exercise Women who are overweight or obese When diagnosed, and then at routine exams   Sexually transmitted infection prevention Women at increased risk for infection - talk with your healthcare provider At routine exams   Use of daily aspirin  Women ages 55 and up in this age group who are at risk for cardiovascular health problems such as stroke When your risk is known   Use of tobacco and the health effects it can cause All women in this age group Every exam   1 American [employer] Society  Date Last Reviewed: 1/26/2016 2000-2019 [employer]. [address] [place] [address], [place] [address]. All rights reserved. This information is not intended as a substitute for professional medical care. Always follow your healthcare professional's instructions.

## 2021-06-18 NOTE — PATIENT INSTRUCTIONS - HE
Patient Instructions by Camille Chopra MD at 2/3/2021  9:20 AM     Author: Camille Chopra MD Service: -- Author Type: Physician    Filed: 2/3/2021  9:49 AM Encounter Date: 2/3/2021 Status: Addendum    : Camille Chopra MD (Physician)    Related Notes: Original Note by Camille Chopra MD (Physician) filed at 2/3/2021  9:48 AM       Look into intermittent fasting, medical wt management can be done can be done     Patient Education     Weight Management: Exercise and Activity    Studies show that people who exercise are the most likely to lose weight and keep it off. Exercise burns calories. It helps build muscle to make your body stronger. Make exercise an important part of your weight-management plan.  Make activity part of your day  You may not think you have the time to exercise. But you can work activity into your daily life--you just need to be committed. Take 10 minutes out of your lunch hour to take a walk. Walk to the [employer] to get your paper instead of having it delivered. Make it a habit to take the stairs instead of the elevator. Park in a far away parking spot instead of the closest. Youll be surprised at how fast these little changes can make a difference.  Some people really cannot walk very far, and tire out quickly with exercise. Instead of becoming discouraged, resolve to do what you can do, and work to make that a regular frequent habit.   The benefits of exercise  Exercise offers many benefits including:     Exercise increases your metabolism (the speed at which your body burns calories).    Regular exercise can increase the amount of muscle in your body. Muscle burns calories faster than fat. The more muscle you have, the more calories you burn.    Exercise gives you energy and curbs your appetite.    Exercise decreases stress and helps you sleep better. Find out for yourself what time of day works best for you.  Make exercise fun  Exercise can be fun. Choose an activity you enjoy. You may even  get a friend to do it with you:    Take a resistance-training or aerobics class    Join a team sport    Take a dance class    Walk the dog    Ride a bike  If you have health problems, be sure to ask your healthcare provider before you start an exercise program. Have a  help you develop a plan thats safe for you.   Date Last Reviewed: 4/1/2018 2000-2019 [employer]. [address] [place] [address], [place] [address]. All rights reserved. This information is not intended as a substitute for professional medical care. Always follow your healthcare professional's instructions.           Patient Education     Weight Management: Fact and Fiction    Knowing the truth about losing weight can help you separate what works from what doesnt. Dont be taken in by expensive weight-loss fads like pills, herbs, and special foods that promise unbelievable results. Theres no magic way to lose weight. If you have questions about weight loss, ask your healthcare provider.  Fiction: The faster I lose weight, the better.  Fact: Rapid weight loss is usually due to loss of water or muscle mass. What youre trying to get rid of is extra fat. Aim to lose a 1/2 pound to 2 pounds a week. Then youre more likely to lose fat rather than water or muscle.  Fiction: Skipping meals will help me lose weight.  Fact: When you skip meals, you dont give your body the energy it needs to work. Hunger makes you more likely to overeat later on. Its best to spread your meals throughout the day. Eat at least three meals a day.  Fiction: I cant start exercising until I lose weight.  Fact: The sooner you start exercising the better. Exercise helps burn more calories, tone your muscles, and keep your appetite in check. People who continue to exercise after they lose weight are more likely to keep the weight off.  Fiction: The fewer calories I eat, the better.  Fact: This seems like it should be true, but its not. When you eat too few  calories, your body acts as if its on a desert island. It thinks food is scarce, so it slows down your metabolism (how fast you burn calories) to save energy. By eating too few calories, you make it harder to lose weight.  Fiction: Once I lose weight, I can go back to living the way I did before.  Fact: Going back to your old eating habits and giving up exercise is a sure way to regain any weight youve lost. The lifestyle changes that help you lose extra weight can also help keep it off. This is why you need to make realistic changes you can stick with.  Fiction: Low-fat and fat-free mean low-calorie.  Fact: All foods, even fat-free ones, have calories. Eat too many calories and youll gain weight. Its OK to treat yourself to a fat-free cookie or two. Just dont eat the whole box! A dietitian will help you figure this out, and will likely recommend that you eat three meals a day, with protein with each meal. Learn to read nutrition labels to see what you are really eating.  Date Last Reviewed: 4/1/2018 2000-2019 [employer]. [address] [place] [address], [place] [address]. All rights reserved. This information is not intended as a substitute for professional medical care. Always follow your healthcare professional's instructions.

## 2021-06-20 NOTE — LETTER
Letter by Camille Chopra MD at      Author: Camille Chopra MD Service: -- Author Type: --    Filed:  Encounter Date: 9/18/2020 Status: (Other)         Roxie Vargas  [id number] [facility] [id number]             September 18, 2020         Dear Ms. Vargas,    Below are the results from your recent visit:    Resulted Orders   Lipid Cascade- FASTING   Result Value Ref Range    Cholesterol 194 <=199 mg/dL    Triglycerides 92 <=149 mg/dL    HDL Cholesterol 55 >=50 mg/dL    LDL Calculated 121 <=129 mg/dL    Patient Fasting > 8hrs? No    Glycosylated Hemoglobin A1c   Result Value Ref Range    Hemoglobin A1c 5.7 (H) <=5.6 %      Comment:      Normal <5.7% Prediabete 5.7-6.4% Diabletes 6.5% or higher - adopted from ADA consensus guidelines   Your labs are in prediabetes range which can easily be improved by regular exercise ( walking ) and eating low carb , low sugar diet, avoid processed food as much as you can.  Normal cholesterol level     Please call with questions or contact us using [employer].    Sincerely,        Electronically signed by Camille Chopra MD

## 2021-06-20 NOTE — LETTER
Letter by Camille Chopra MD at      Author: Camille Chopra MD Service: -- Author Type: --    Filed:  Encounter Date: 9/29/2020 Status: (Other)         Roxie Vargas  [id number] [facility] [address]             September 29, 2020         Dear Ms. Vargas,    We are happy to inform you that your recent Pap smear and Human Papillomavirus (HPV) test results are normal and negative.    It is recommended that you have your next Pap smear and Human Papillomavirus (HPV) test in 5 years. You will also need to return to the clinic every year for an annual wellness visit.    If you have additional questions regarding this result, please contact our office and we will be happy to assist you.      Sincerely,    Your [facility] Care Team

## 2021-06-23 ENCOUNTER — TELEPHONE (OUTPATIENT)
Dept: GASTROENTEROLOGY | Facility: CLINIC | Age: 61
End: 2021-06-23

## 2021-06-23 DIAGNOSIS — K75.4 AUTOIMMUNE HEPATITIS (H): Primary | ICD-10-CM

## 2021-06-23 NOTE — TELEPHONE ENCOUNTER
[facility] Call Center    Phone Message    May a detailed message be left on voicemail: yes     Reason for Call: Order(s): Other:   Reason for requested: Lab Orders  Date needed: ASAP  Provider name: Ridge Sanchez    Please call patient when orders are ready at [phone].      Action Taken: Message routed to:  [facility]    Travel Screening: Not Applicable

## 2021-06-29 NOTE — PROGRESS NOTES
Progress Notes by Camille Chopra MD at 9/17/2020 12:20 PM     Author: Camille Chopra MD Service: -- Author Type: Physician    Filed: 9/17/2020  4:06 PM Encounter Date: 9/17/2020 Status: Signed    : Camille Chopra MD (Physician)       FEMALE PREVENTATIVE EXAM    Assessment and Plan:       Roxie was seen today for annual exam, establish care and cold legs.  Female preventive visit/establish care  I did give her a choice of seeing a nutritional therapist to help with the weight management she like to wait on it    I discussed the following with the patient:   Adult Healthy Living: Importance of regular exercise  Healthy nutrition  Getting adequate sleep  Stress management  Supplement use  Herbal medications/alternative medical therapies      Encounter for colorectal cancer screening  -     Ambulatory referral for Colonoscopy    Encounter for screening for diabetes mellitus  -     Glycosylated Hemoglobin A1c    Visit for screening mammogram  -     Mammo Screening Bilateral; Future    Encounter for screening for lipoid disorders  -     Lipid [place]- FASTING    Screening for cervical cancer  -     Gynecologic Cytology (PAP Smear)    Screen for colon cancer  Willing to do colonoscopy    Hyperpigmentation of skin  Comments:  can't rule out any rheumatologic process causing poor circulation and rash history of autoimmune hepatitis   Orders:  -     Ambulatory referral to Rheumatology  -     Antinuclear Antibodies Screen (MARIANA)    Pain in shin, unspecified laterality  Like to rule out any autoimmune process which might be causing her symptoms as she does have a history of knee pain more she describe it as arthritis when she going up and down the steps, denies any problem with walking she hardly does any physical activity most of the time just sitting at her desk  For now we gave her the recommendation of wearing a compression stockings to help with the circulation and also feeling cold checking her thyroid level and basic  antinuclear antibody screen to guide us in the right direction  -     Ambulatory referral to Rheumatology  -     Antinuclear Antibodies Screen (MARIANA)    Autoimmune hepatitis (H)  Currently stable based on her recent labs she will continue azathioprine for now  Other orders  -     Influenza, Recombinant, Inj, Quadrivalent, PF, 18+YRS        Next follow up:  1 yr for annual physical    Immunization Reviewed and if needed ordered please see A/P    There are no preventive care reminders to display for this patient.    Immunization History   Administered Date(s) Administered   ? Hep A, Adult IM (19yr & older) 06/06/2008   ? Hep A, historic 06/06/2008, 12/08/2008   ? Hep B, Adult 06/06/2008, 08/08/2008   ? Hep B, historic 11/25/1998, 01/06/1999, 06/09/1999, 06/06/2008, 08/08/2008, 12/08/2008   ? INFLUENZA,RECOMBINANT,INJ,PF QUADRIVALENT 18+YRS 09/17/2020   ? Influenza,live, Nasal Laiv4 10/05/2016   ? Influenza,seasonal,intradermal PF IIV3 10/03/2011, 10/03/2012   ? MMR 08/21/1991, 10/02/1991   ? Meningococcal MCV4P 11/15/2016   ? Pneumo Conj 13-V (2010&after) 11/15/2016   ? YUMIKO 01/05/2001   ? Td, Adult, Absorbed 02/07/1990   ? Td, adult adsorbed, PF 10/02/2000   ? Tdap 06/06/2008, 10/18/2011   ? Typhoid, Inj, Inactive 11/15/2016       The following high BMI interventions were performed this visit: dietary management education, guidance, and counseling    I have had an Advance Directives discussion with the patient.    Subjective:   Chief Complaint: Roxei Vargas is an 60 y.o. female here for a preventative health visit.     HPI: Roxie who is new to our practice here to establish care, previous care provided at Replaced by [facility] in [place].  Most recent visit with the gastroenterologist as a virtual visit for follow-up on her autoimmune hepatitis for which she takes azathioprine for last 4 years  Overall doing well her liver function test within normal limit denies any new symptoms of abdominal pain nausea vomiting or  feeling tired no change in bowel habits    Her major new concern today is feeling very cold in her shins she specifically pointing at mid leg area and with some rash and discoloration of the skin in that area more look like erythema nodosum but not very circular patches but widespread hyperpigmentation Patient never seen a rheumatologist for her issues in the past only gastroenterologist  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  No LMP recorded (lmp unknown). Patient is postmenopausal.     No data recorded   No data recorded     Social History     Social History Narrative   ? Not on file      Healthy Habits  Are you taking a daily aspirin? No  Do you typically exercising at least 40 min, 3-4 times per week?  NO  Do you usually eat at least 4 servings of fruit and vegetables a day, include whole grains and fiber and avoid regularly eating high fat foods? Yes  Have you had an eye exam in the past two years? Yes  Do you see a dentist twice per year? Yes  Do you have any concerns regarding sleep? No    Safety Screen  If you own firearms, are they secured in a locked gun cabinet or with trigger locks? Yes    Do you feel you are safe where you are living?: Yes (9/17/2020  2:37 PM)  Do you feel you are safe in your relationship(s)?: Yes (9/17/2020  2:37 PM)      Review of Systems:  Please see above.  The rest of the review of systems are negative for all systems.     Pap History:   Yes - updated in Problem List and Health Maintenance accordingly    Cancer Screening       Status Date      PAP SMEAR Overdue 6/26/1982     MAMMOGRAM Overdue 3/13/2011      Done 3/13/2009 MAMMO SCREENING BILATERAL          Patient Care Team:  Provider, No Primary Care as PCP - General        History     Reviewed By Date/Time Sections Reviewed    Verónica Monae CMA 9/17/2020  2:35 PM Tobacco, Alcohol, Drug Use, Sexual Activity            Objective:   Vital Signs:   Visit  "Vitals  /70 (Patient Site: Left Arm, Patient Position: Sitting, Cuff Size: Adult Large)   Pulse 84   Ht 5' 3.39\" (1.61 m)   Wt (!) 250 lb 11.2 oz (113.7 kg)   LMP  (LMP Unknown) Comment: doctor induced menopause when taking out her fibroids    Breastfeeding No   BMI 43.87 kg/m         PHYSICAL EXAM  Physical Exam:  General Appearance:  Appears comfortable, Alert, cooperative, no distress,   Head: Normocephalic, without obvious abnormality, atraumatic  Eyes: PERRL, conjunctiva/corneas clear, EOM's intact, both eyes             Nose: Nares normal, no drainage   Throat: Lips, mucosa, and tongue normal; teeth and gums normal  Neck: Supple, symmetrical, trachea midline, no adenopathy;                      Lungs: Clear to auscultation bilaterally, respirations unlabored  Pelvic exam: normal external genitalia, vulva, vagina, cervix, uterus and adnexa, PAP: Pap smear done today.  Heart: Regular rate and rhythm, S1 and S2 normal, no murmur, rubs or gallop  Abdomen: Soft, non-tender, bowel sounds active all four quadrants,   no masses, no organomegaly  Extremities: Extremities normal, atraumatic, no cyanosis or edema  Pulses: DP pulses are 1-2+ bilat.    Skin: hyperpigmented spots in the shin with trace edema no mild tenderness   Neurologic: normal and equal strength bilat in upper and lower extremities          The ASCVD Risk score (Toston DC Jr., et al., 2013) failed to calculate for the following reasons:    Cannot find a previous HDL lab    Cannot find a previous total cholesterol lab         Medication List          Accurate as of September 17, 2020  4:05 PM. If you have any questions, ask your nurse or doctor.            CONTINUE taking these medications    azaTHIOprine 50 mg tablet  Also known as:  IMURAN  INSTRUCTIONS:  Take 50 mg by mouth daily.        IRON ORAL  INSTRUCTIONS:  Take 1 tablet by mouth daily.        MULTIVITAMIN ORAL  INSTRUCTIONS:  Take 1 tablet by mouth daily.        olopatadine 0.2 % " Drop  INSTRUCTIONS:  Apply 1 drop to eye daily.        VITAMIN D3 ORAL  INSTRUCTIONS:  Take 2,000 Units by mouth daily.               Additional Screenings Completed Today:

## 2021-07-13 ENCOUNTER — RECORDS - HEALTHEAST (OUTPATIENT)
Dept: ADMINISTRATIVE | Facility: CLINIC | Age: 61
End: 2021-07-13

## 2021-07-15 ENCOUNTER — LAB (OUTPATIENT)
Dept: LAB | Facility: CLINIC | Age: 61
End: 2021-07-15
Payer: COMMERCIAL

## 2021-07-15 DIAGNOSIS — K75.4 AUTOIMMUNE HEPATITIS (H): ICD-10-CM

## 2021-07-15 LAB
ALBUMIN SERPL-MCNC: 3.8 G/DL (ref 3.5–5)
ALP SERPL-CCNC: 84 U/L (ref 45–120)
ALT SERPL W P-5'-P-CCNC: 10 U/L (ref 0–45)
ANION GAP SERPL CALCULATED.3IONS-SCNC: 5 MMOL/L (ref 5–18)
AST SERPL W P-5'-P-CCNC: 12 U/L (ref 0–40)
BILIRUB DIRECT SERPL-MCNC: 0.2 MG/DL
BILIRUB SERPL-MCNC: 0.7 MG/DL (ref 0–1)
BUN SERPL-MCNC: 7 MG/DL (ref 8–22)
CALCIUM SERPL-MCNC: 9.4 MG/DL (ref 8.5–10.5)
CHLORIDE BLD-SCNC: 111 MMOL/L (ref 98–107)
CO2 SERPL-SCNC: 25 MMOL/L (ref 22–31)
CREAT SERPL-MCNC: 0.72 MG/DL (ref 0.6–1.1)
ERYTHROCYTE [DISTWIDTH] IN BLOOD BY AUTOMATED COUNT: 15.2 % (ref 10–15)
GFR SERPL CREATININE-BSD FRML MDRD: >90 ML/MIN/1.73M2
GLUCOSE BLD-MCNC: 96 MG/DL (ref 70–125)
HCT VFR BLD AUTO: 35.6 % (ref 35–47)
HGB BLD-MCNC: 11.3 G/DL (ref 11.7–15.7)
INR PPP: 1.07 (ref 0.85–1.15)
MCH RBC QN AUTO: 26.9 PG (ref 26.5–33)
MCHC RBC AUTO-ENTMCNC: 31.7 G/DL (ref 31.5–36.5)
MCV RBC AUTO: 85 FL (ref 78–100)
PLATELET # BLD AUTO: 200 10E3/UL (ref 150–450)
POTASSIUM BLD-SCNC: 3.9 MMOL/L (ref 3.5–5)
PROT SERPL-MCNC: 7.7 G/DL (ref 6–8)
RBC # BLD AUTO: 4.2 10E6/UL (ref 3.8–5.2)
SODIUM SERPL-SCNC: 141 MMOL/L (ref 136–145)
WBC # BLD AUTO: 6.6 10E3/UL (ref 4–11)

## 2021-07-15 PROCEDURE — 82248 BILIRUBIN DIRECT: CPT

## 2021-07-15 PROCEDURE — 80053 COMPREHEN METABOLIC PANEL: CPT

## 2021-07-15 PROCEDURE — 85027 COMPLETE CBC AUTOMATED: CPT

## 2021-07-15 PROCEDURE — 36415 COLL VENOUS BLD VENIPUNCTURE: CPT

## 2021-07-15 PROCEDURE — 85610 PROTHROMBIN TIME: CPT

## 2021-07-19 ENCOUNTER — VIRTUAL VISIT (OUTPATIENT)
Dept: SURGERY | Facility: CLINIC | Age: 61
End: 2021-07-19
Payer: COMMERCIAL

## 2021-07-19 DIAGNOSIS — E66.01 CLASS 3 SEVERE OBESITY DUE TO EXCESS CALORIES WITHOUT SERIOUS COMORBIDITY WITH BODY MASS INDEX (BMI) OF 40.0 TO 44.9 IN ADULT (H): Primary | ICD-10-CM

## 2021-07-19 DIAGNOSIS — E66.813 CLASS 3 SEVERE OBESITY DUE TO EXCESS CALORIES WITHOUT SERIOUS COMORBIDITY WITH BODY MASS INDEX (BMI) OF 40.0 TO 44.9 IN ADULT (H): Primary | ICD-10-CM

## 2021-07-19 DIAGNOSIS — Z71.3 NUTRITIONAL COUNSELING: ICD-10-CM

## 2021-07-19 PROCEDURE — 97802 MEDICAL NUTRITION INDIV IN: CPT | Mod: 95 | Performed by: DIETITIAN, REGISTERED

## 2021-07-19 NOTE — PROGRESS NOTES
Roxie Vargas is a 61 year old female who is being evaluated via a billable video visit.       How would you like to obtain your AVS? MyChart.  If dropped from the video visit, the video invitation should be resent by: text: [phone]  Will anyone else be joining your video visit? No    Video Start Time: 12:58 pm       Medical  Weight Loss Initial Diet Evaluation  Roxie is presenting today for a new weight management nutrition consultation.   Personal Goals: to lose weight    Nutrition Assessment:   Anthropometrics:  Current Weight: 234 lbs   BMI: There is no height or weight on file to calculate BMI.   Patient weight not recorded    Estimated RMR ([place]-St Jeor equation):  1605 kcals x 1.3 (light active) = 2087 kcals (for weight maintenance)    Recommended Protein Intake: 60-80 grams of protein/day  Medical History:  Patient Active Problem List   Diagnosis     Anemia     Astigmatism     Myopia     Acute hepatitis     Autoimmune hepatitis (H)     Pain in thumb joint with movement of left hand     Radial styloid tenosynovitis     Synovitis and tenosynovitis     Morbid obesity (H)      Diabetes: No  No results found for: HGBA1C     Nutrition History:   Food allergies/intolerances/cultural or religous food customs: Yes Shrimp  Does struggle with Acid Reflux-Omeprazole     Vitamins/[place] Supplementation: Vitamin D3, MVI, Iron, Calcium     Dietary Recall:  Breakfast: skipped   Lunch: Brown Rice or Whole Grain Pasta with Chicken/Turkey/Fish, occasional Vegetable or [place] with Tuna or Egg Salad   Dinner: fruit (banana or apple), crackers (occasionally) or Smoothie   Typical Snacks: None  Overnight eating: No     Eating out: 1-2 times/month     Beverages: Coffee with Creamer, Decaf Tea with Brown Sugar and Creamer    Exercise: joined a gym most recently-3 times/week (goal) Treadmill for 15-20 minutes     Nutrition Diagnosis (PES statement):   Overweight/Obesity (NC 3.3) related to overeating and poor lifestyle  habits as evidenced by patient report of inability to lose weight and BMI 41.5 kg/m2.     Nutrition Intervention:  1. Food and/or Nutrient Delivery   a. Placed emphasis on importance of developing a healthy meal routine, aiming for 3 meals a day and no snacks.  b. Discussed using a protein supplement as a meal replacement  2. Nutrition Education   a. Discussed with patient how to build a meal: the importance of including a lean/low fat protein at each meal, include a source of vegetables at a minimum of lunch and dinner and limiting carbohydrate intake to 1 serving per meal.  b. Educated on sources of lean protein, portion sizes, the amount of grams found in each source. Recommend patient to aim for 20-30g protein at each meal.  c. Educated on how to read a food label: keeping total fat <10g and sugar <10g per serving.  d. Discussed the importance of adequate hydration, with emphasis on drinking 64oz of water or zero calorie beverages per day.    3. Nutrition Counseling   a. Encouraged importance of developing routine exercise for health benefits and weight loss.    Goals established by patient:   1. Start tracking intake via food journal, aiming for 5044-2468 calories/day.   2. Focus on protein first, aiming for 60-80 grams of protein/day.  3. Be consistent with eating breakfast on a daily basis.   Handouts provided:  Non Surgical Weight Loss Packet    Assessment/Plan:    Pt will follow up in 2 month(s) with dietitian.     Video-Visit Details    Type of service:  Video Visit    Video End Time (time video stopped): 1:25 PM  Originating Location (pt. Location): Home    Distant Location (provider location):  [facility] SURGERY [facility] AND BARIATRICS CARE [place]     Platform used for Video Visit: Sera Oreilly MS [employer]ART

## 2021-07-19 NOTE — LETTER
7/19/2021         RE: Roxie Vargas  [id number] [id number]        Dear Colleague,    Thank you for referring your patient, Roxie Vargas, to the [facility] AND BARIATRICS CARE [place]. Please see a copy of my visit note below.    Roxie Vargas is a 61 year old female who is being evaluated via a billable video visit.       How would you like to obtain your AVS? MyChart.  If dropped from the video visit, the video invitation should be resent by: text: [phone]  Will anyone else be joining your video visit? No    Video Start Time: 12:58 pm       Medical  Weight Loss Initial Diet Evaluation  Roxie is presenting today for a new weight management nutrition consultation.   Personal Goals: to lose weight    Nutrition Assessment:   Anthropometrics:  Current Weight: 234 lbs   BMI: There is no height or weight on file to calculate BMI.   Patient weight not recorded    Estimated RMR ([place]-St Jeor equation):  1605 kcals x 1.3 (light active) = 2087 kcals (for weight maintenance)    Recommended Protein Intake: 60-80 grams of protein/day  Medical History:  Patient Active Problem List   Diagnosis     Anemia     Astigmatism     Myopia     Acute hepatitis     Autoimmune hepatitis (H)     Pain in thumb joint with movement of left hand     Radial styloid tenosynovitis     Synovitis and tenosynovitis     Morbid obesity (H)      Diabetes: No  No results found for: HGBA1C     Nutrition History:   Food allergies/intolerances/cultural or religous food customs: Yes Shrimp  Does struggle with Acid Reflux-Omeprazole     Vitamins/[place] Supplementation: Vitamin D3, MVI, Iron, Calcium     Dietary Recall:  Breakfast: skipped   Lunch: Brown Rice or Whole Grain Pasta with Chicken/Turkey/Fish, occasional Vegetable or [place] with Tuna or Egg Salad   Dinner: fruit (banana or apple), crackers (occasionally) or Smoothie   Typical Snacks: None  Overnight eating: No     Eating out:  1-2 times/month     Beverages: Coffee with Creamer, Decaf Tea with Brown Sugar and Creamer    Exercise: joined a gym most recently-3 times/week (goal) Treadmill for 15-20 minutes     Nutrition Diagnosis (PES statement):   Overweight/Obesity (NC 3.3) related to overeating and poor lifestyle habits as evidenced by patient report of inability to lose weight and BMI 41.5 kg/m2.     Nutrition Intervention:  1. Food and/or Nutrient Delivery   a. Placed emphasis on importance of developing a healthy meal routine, aiming for 3 meals a day and no snacks.  b. Discussed using a protein supplement as a meal replacement  2. Nutrition Education   a. Discussed with patient how to build a meal: the importance of including a lean/low fat protein at each meal, include a source of vegetables at a minimum of lunch and dinner and limiting carbohydrate intake to 1 serving per meal.  b. Educated on sources of lean protein, portion sizes, the amount of grams found in each source. Recommend patient to aim for 20-30g protein at each meal.  c. Educated on how to read a food label: keeping total fat <10g and sugar <10g per serving.  d. Discussed the importance of adequate hydration, with emphasis on drinking 64oz of water or zero calorie beverages per day.    3. Nutrition Counseling   a. Encouraged importance of developing routine exercise for health benefits and weight loss.    Goals established by patient:   1. Start tracking intake via food journal, aiming for 6985-2870 calories/day.   2. Focus on protein first, aiming for 60-80 grams of protein/day.  3. Be consistent with eating breakfast on a daily basis.   Handouts provided:  Non Surgical Weight Loss Packet    Assessment/Plan:    Pt will follow up in 2 month(s) with dietitian.     Video-Visit Details    Type of service:  Video Visit    Video End Time (time video stopped): 1:25 PM  Originating Location (pt. Location): Home    Distant Location (provider location):  [facility]  SURGERY CLINIC AND BARIATRICS CARE [place]     Platform used for Video Visit: Sera Oreilly MS RD LD         Again, thank you for allowing me to participate in the care of your patient.        Sincerely,        Aaliyah Oreilly, RD

## 2021-07-23 ENCOUNTER — VIRTUAL VISIT (OUTPATIENT)
Dept: GASTROENTEROLOGY | Facility: CLINIC | Age: 61
End: 2021-07-23
Attending: PHYSICIAN ASSISTANT
Payer: COMMERCIAL

## 2021-07-23 VITALS — WEIGHT: 254 LBS | HEIGHT: 64 IN | BODY MASS INDEX: 43.36 KG/M2

## 2021-07-23 DIAGNOSIS — K75.4 AUTOIMMUNE HEPATITIS (H): Primary | ICD-10-CM

## 2021-07-23 PROCEDURE — 99214 OFFICE O/P EST MOD 30 MIN: CPT | Mod: 95 | Performed by: PHYSICIAN ASSISTANT

## 2021-07-23 ASSESSMENT — MIFFLIN-ST. JEOR: SCORE: 1707.14

## 2021-07-23 NOTE — PROGRESS NOTES
"Chief Complaint   Patient presents with     Follow Up     Height 1.626 m (5' 4\"), weight 115.2 kg (254 lb), not currently breastfeeding.    Roxie is a 61 year old who is being evaluated via a billable video visit.      How would you like to obtain your AVS? MyChart  If the video visit is dropped, the invitation should be resent by: Other e-mail: use [employer]  Will anyone else be joining your video visit? No      Video Start Time: 8:18 am   Video-Visit Details    Type of service:  Video Visit    Video End Time:  8:41 am     Originating Location (pt. Location):    Distant Location (provider location):  [facility]PATOLOG[facility] [place]     Platform used for Video Visit: [facility]       Hepatology Clinic note  Roxie Vargas   Date of Birth 1960  Date of Service 7/23/2021         Assessment/plan:   Roxie Vargas is a 61 year old female with history of autoimmune hepatitis who has been maintained on 50 mg Azathioprine. Her transaminases and liver function remain normal over the past four years. She has no stigmata of advanced liver disease.     Patient wishes to attempt discontinuing medication. We discussed risks including primarily a flare of her disease. Discussed need for closer follow up and more frequent labs. Will attempt further taper of her medication and see how labs respond.     - Reduce to 25 mg Azathioprine daily.   - Repeat hepatic panel in 2-3 months.  - Continue slow gradual weight loss   - Follow-up in clinic in six months or sooner as needed    Ridge Sanchez PA-C   [facility]    Discussed with Dr. Neri  -----------------------------------------------------       HPI:   Roxie Vargas is a 61 year old female presenting for the follow-up.    Autoimmune Hepatitis   -Diagnosed: December 2015  -Prior biopsy: 1/6/2106, bridging fibrosis   Fibrosis Scan: 6/7/2018, F0-F1 , 5.8 kilopascals   -Prior treatments:   - Prednisone for induction at diagnosis,   - " Azothioprine since   Previous IG on 2016       Patient was last seen by me on 2020.  No recent hospitalization or ER visits.   Weight was up to 254 lbs. Appetite is good. She is working on more regular meals   Started going to gym 2 times a week. She is seeing a dietitian to help with weight loss. Urine is still dark despite drinking lots fluids.     Patient denies jaundice, lower extremity edema, abdominal distension or confusion.  Patient also denies melena, hematochezia or hematemesis. Patient denies weight loss, fevers, sweats or chills.    She does not drink alcohol.     Medical hx Surgical hx   Past Medical History:   Diagnosis Date     Autoimmune hepatitis (H)      Colitis, ulcerative (H)      Fibroids      Heartburn      Iron deficiency anemia      Ulcer     Past Surgical History:   Procedure Laterality Date     COLONOSCOPY  2013    Procedure: COLONOSCOPY;;  Surgeon: Tobias Merchant MD;  Location:  GI     dilation & curretage         uterine fibroids removed[  2005     wisdom teeth[                   Medications:     Current Outpatient Medications   Medication     ascorbic acid (VITAMIN C) 1000 MG TABS     atovaquone-proguanil (MALARONE) 250-100 MG per tablet     azaTHIOprine (IMURAN) 50 MG tablet     carboxymethylcellulose (CARBOXYMETHYLCELLULOSE SODIUM) 0.5 % SOLN ophthalmic solution     Cholecalciferol (VITAMIN D PO)     Ferrous Sulfate (IRON CR PO)     ibuprofen (ADVIL,MOTRIN) 600 MG tablet     multivitamin (CENTRUM SILVER) tablet     olopatadine (PATADAY) 0.2 % ophthalmic solution     No current facility-administered medications for this visit.            Allergies:     Allergies   Allergen Reactions     Shrimp Anaphylaxis     Aspirin Other (See Comments)     Watery eyes. Happened a long time ago.             Review of Systems:   10 points ROS was obtained and highlighted in the HPI, otherwise negative.          Physical Exam:     GENERAL: healthy, alert and no distress  EYES:  Eyes grossly normal to inspection, conjunctivae and sclerae normal  RESP: no audible wheeze, cough, or visible cyanosis.  No visible retractions or increased work of breathing.  Able to speak fully in complete sentences  NEURO: Cranial nerves grossly intact, mentation intact and speech normal  PSYCH: mentation appears normal, affect normal/bright, judgement and insight intact, normal speech and appearance well-groomed           Data:   Reviewed in person and significant for:    Lab Results   Component Value Date     07/15/2021     06/26/2020      Lab Results   Component Value Date    POTASSIUM 3.9 07/15/2021    POTASSIUM 3.5 06/26/2020     Lab Results   Component Value Date    CHLORIDE 111 07/15/2021    CHLORIDE 107 06/26/2020     Lab Results   Component Value Date    CO2 25 07/15/2021    CO2 26 06/26/2020     Lab Results   Component Value Date    BUN 7 07/15/2021    BUN 9 06/26/2020     Lab Results   Component Value Date    CR 0.72 07/15/2021    CR 0.67 06/26/2020       Lab Results   Component Value Date    WBC 6.6 07/15/2021    WBC 6.5 06/26/2020     Lab Results   Component Value Date    HGB 11.3 07/15/2021    HGB 11.4 06/26/2020     Lab Results   Component Value Date    HCT 35.6 07/15/2021    HCT 37.1 06/26/2020     Lab Results   Component Value Date    MCV 85 07/15/2021    MCV 87 06/26/2020     Lab Results   Component Value Date     07/15/2021     06/26/2020       Lab Results   Component Value Date    AST 12 07/15/2021    AST 9 06/26/2020     Lab Results   Component Value Date    ALT 10 07/15/2021    ALT 16 06/26/2020     No results found for: BILICONJ   Lab Results   Component Value Date    BILITOTAL 0.7 07/15/2021    BILITOTAL 0.4 06/26/2020       Lab Results   Component Value Date    ALBUMIN 3.8 07/15/2021    ALBUMIN 3.6 06/26/2020     Lab Results   Component Value Date    PROTTOTAL 7.7 07/15/2021    PROTTOTAL 8.1 06/26/2020      Lab Results   Component Value Date    ALKPHOS 84  07/15/2021    ALKPHOS 81 06/26/2020       Lab Results   Component Value Date    INR 1.07 07/15/2021    INR 1.05 06/26/2020     US ABDOMEN LIMITED:   2/18/2021:   EXAM: US ABDOMEN LIMITED  LOCATION: [facility]  DATE/TIME: 2/18/2021 7:07 AM     INDICATION: autoimmune hepatitis / c/o RUQ pain  COMPARISON: None.  TECHNIQUE: Limited abdominal ultrasound.     FINDINGS:     GALLBLADDER: Normal. No gallstones, wall thickening, or pericholecystic fluid. Negative sonographic Ramires's sign.     BILE DUCTS: No biliary dilatation. The common duct measures 2 mm.     LIVER: Normal parenchyma with smooth contour. No focal mass.     RIGHT KIDNEY: No hydronephrosis.     PANCREAS: The visualized portions are normal.     No ascites.     IMPRESSION:     Normal limited abdominal ultrasound

## 2021-07-23 NOTE — LETTER
"    7/23/2021         RE: Roxie Vargas  [id number] [facility] [address]        Dear Colleague,    Thank you for referring your patient, Roxie Vargas, to the [facility] [place]. Please see a copy of my visit note below.    Chief Complaint   Patient presents with     Follow Up     Height 1.626 m (5' 4\"), weight 115.2 kg (254 lb), not currently breastfeeding.    Roxie is a 61 year old who is being evaluated via a billable video visit.      How would you like to obtain your AVS? MyChart  If the video visit is dropped, the invitation should be resent by: Other e-mail: use [employer]  Will anyone else be joining your video visit? No      Video Start Time: 8:18 am   Video-Visit Details    Type of service:  Video Visit    Video End Time:  8:41 am     Originating Location (pt. Location):    Distant Location (provider location):  [facility] HEPATOLOG[facility]     Platform used for Video Visit: [facility]       Hepatology Clinic note  Roxie Vargas   Date of Birth 1960  Date of Service 7/23/2021         Assessment/plan:   Roxie Vargas is a 61 year old female with history of autoimmune hepatitis who has been maintained on 50 mg Azathioprine. Her transaminases and liver function remain normal over the past four years. She has no stigmata of advanced liver disease.     Patient wishes to attempt discontinuing medication. We discussed risks including primarily a flare of her disease. Discussed need for closer follow up and more frequent labs. Will attempt further taper of her medication and see how labs respond.     - Reduce to 25 mg Azathioprine daily.   - Repeat hepatic panel in 2-3 months.  - Continue slow gradual weight loss   - Follow-up in clinic in six months or sooner as needed    Ridge Sanchez PA-C   [facility] Hepatology clinic    Discussed with Dr. Neri  -----------------------------------------------------       HPI:   Roxie ALEXANDER" Alicia is a 61 year old female presenting for the follow-up.    Autoimmune Hepatitis   -Diagnosed: 2015  -Prior biopsy: 2106, bridging fibrosis   Fibrosis Scan: 2018, F0-F1 , 5.8 kilopascals   -Prior treatments:   - Prednisone for induction at diagnosis,   - Azothioprine since   Previous IG on 2016       Patient was last seen by me on 2020.  No recent hospitalization or ER visits.   Weight was up to 254 lbs. Appetite is good. She is working on more regular meals   Started going to gym 2 times a week. She is seeing a dietitian to help with weight loss. Urine is still dark despite drinking lots fluids.     Patient denies jaundice, lower extremity edema, abdominal distension or confusion.  Patient also denies melena, hematochezia or hematemesis. Patient denies weight loss, fevers, sweats or chills.    She does not drink alcohol.     Medical hx Surgical hx   Past Medical History:   Diagnosis Date     Autoimmune hepatitis (H)      Colitis, ulcerative (H)      Fibroids      Heartburn      Iron deficiency anemia      Ulcer     Past Surgical History:   Procedure Laterality Date     COLONOSCOPY  2013    Procedure: COLONOSCOPY;;  Surgeon: Tobias Merchant MD;  Location: [facility]     dilation & curretage         uterine fibroids removed[       wisdom teeth[                   Medications:     Current Outpatient Medications   Medication     ascorbic acid (VITAMIN C) 1000 MG TABS     atovaquone-proguanil (MALARONE) 250-100 MG per tablet     azaTHIOprine (IMURAN) 50 MG tablet     carboxymethylcellulose (CARBOXYMETHYLCELLULOSE SODIUM) 0.5 % SOLN ophthalmic solution     Cholecalciferol (VITAMIN D PO)     Ferrous Sulfate (IRON CR PO)     ibuprofen (ADVIL,MOTRIN) 600 MG tablet     multivitamin (CENTRUM SILVER) tablet     olopatadine (PATADAY) 0.2 % ophthalmic solution     No current facility-administered medications for this visit.            Allergies:     Allergies   Allergen Reactions      Shrimp Anaphylaxis     Aspirin Other (See Comments)     Watery eyes. Happened a long time ago.             Review of Systems:   10 points ROS was obtained and highlighted in the HPI, otherwise negative.          Physical Exam:     GENERAL: healthy, alert and no distress  EYES: Eyes grossly normal to inspection, conjunctivae and sclerae normal  RESP: no audible wheeze, cough, or visible cyanosis.  No visible retractions or increased work of breathing.  Able to speak fully in complete sentences  NEURO: Cranial nerves grossly intact, mentation intact and speech normal  PSYCH: mentation appears normal, affect normal/bright, judgement and insight intact, normal speech and appearance well-groomed           Data:   Reviewed in person and significant for:    Lab Results   Component Value Date     07/15/2021     06/26/2020      Lab Results   Component Value Date    POTASSIUM 3.9 07/15/2021    POTASSIUM 3.5 06/26/2020     Lab Results   Component Value Date    CHLORIDE 111 07/15/2021    CHLORIDE 107 06/26/2020     Lab Results   Component Value Date    CO2 25 07/15/2021    CO2 26 06/26/2020     Lab Results   Component Value Date    BUN 7 07/15/2021    BUN 9 06/26/2020     Lab Results   Component Value Date    CR 0.72 07/15/2021    CR 0.67 06/26/2020       Lab Results   Component Value Date    WBC 6.6 07/15/2021    WBC 6.5 06/26/2020     Lab Results   Component Value Date    HGB 11.3 07/15/2021    HGB 11.4 06/26/2020     Lab Results   Component Value Date    HCT 35.6 07/15/2021    HCT 37.1 06/26/2020     Lab Results   Component Value Date    MCV 85 07/15/2021    MCV 87 06/26/2020     Lab Results   Component Value Date     07/15/2021     06/26/2020       Lab Results   Component Value Date    AST 12 07/15/2021    AST 9 06/26/2020     Lab Results   Component Value Date    ALT 10 07/15/2021    ALT 16 06/26/2020     No results found for: BILICONJ   Lab Results   Component Value Date    BILITOTAL 0.7  07/15/2021    BILITOTAL 0.4 06/26/2020       Lab Results   Component Value Date    ALBUMIN 3.8 07/15/2021    ALBUMIN 3.6 06/26/2020     Lab Results   Component Value Date    PROTTOTAL 7.7 07/15/2021    PROTTOTAL 8.1 06/26/2020      Lab Results   Component Value Date    ALKPHOS 84 07/15/2021    ALKPHOS 81 06/26/2020       Lab Results   Component Value Date    INR 1.07 07/15/2021    INR 1.05 06/26/2020     US ABDOMEN LIMITED:   2/18/2021:   EXAM: US ABDOMEN LIMITED  LOCATION: [facility]  DATE/TIME: 2/18/2021 7:07 AM     INDICATION: autoimmune hepatitis / c/o RUQ pain  COMPARISON: None.  TECHNIQUE: Limited abdominal ultrasound.     FINDINGS:     GALLBLADDER: Normal. No gallstones, wall thickening, or pericholecystic fluid. Negative sonographic Ramires's sign.     BILE DUCTS: No biliary dilatation. The common duct measures 2 mm.     LIVER: Normal parenchyma with smooth contour. No focal mass.     RIGHT KIDNEY: No hydronephrosis.     PANCREAS: The visualized portions are normal.     No ascites.     IMPRESSION:     Normal limited abdominal ultrasound        Again, thank you for allowing me to participate in the care of your patient.        Sincerely,        Ridge Sanchez PA-C

## 2021-08-08 RX ORDER — AZATHIOPRINE 50 MG/1
25 TABLET ORAL DAILY
Qty: 30 TABLET | Refills: 1 | Status: SHIPPED | OUTPATIENT
Start: 2021-08-08 | End: 2021-11-29

## 2021-09-19 ENCOUNTER — HEALTH MAINTENANCE LETTER (OUTPATIENT)
Age: 61
End: 2021-09-19

## 2021-11-09 ENCOUNTER — OFFICE VISIT (OUTPATIENT)
Dept: FAMILY MEDICINE | Facility: CLINIC | Age: 61
End: 2021-11-09
Payer: COMMERCIAL

## 2021-11-09 VITALS
WEIGHT: 241.8 LBS | DIASTOLIC BLOOD PRESSURE: 64 MMHG | BODY MASS INDEX: 41.28 KG/M2 | HEIGHT: 64 IN | SYSTOLIC BLOOD PRESSURE: 118 MMHG | HEART RATE: 72 BPM

## 2021-11-09 DIAGNOSIS — K75.4 AUTOIMMUNE HEPATITIS (H): ICD-10-CM

## 2021-11-09 DIAGNOSIS — R73.03 PREDIABETES: ICD-10-CM

## 2021-11-09 DIAGNOSIS — R42 DIZZINESS: ICD-10-CM

## 2021-11-09 DIAGNOSIS — Z00.01 ENCOUNTER FOR ROUTINE ADULT MEDICAL EXAM WITH ABNORMAL FINDINGS: Primary | ICD-10-CM

## 2021-11-09 DIAGNOSIS — Z12.31 VISIT FOR SCREENING MAMMOGRAM: ICD-10-CM

## 2021-11-09 DIAGNOSIS — R10.31 ABDOMINAL PAIN, RIGHT LOWER QUADRANT: ICD-10-CM

## 2021-11-09 DIAGNOSIS — Z13.220 LIPID SCREENING: ICD-10-CM

## 2021-11-09 PROBLEM — K21.00 GASTROESOPHAGEAL REFLUX DISEASE WITH ESOPHAGITIS WITHOUT HEMORRHAGE: Status: ACTIVE | Noted: 2021-02-03

## 2021-11-09 PROCEDURE — 99396 PREV VISIT EST AGE 40-64: CPT | Mod: 25 | Performed by: FAMILY MEDICINE

## 2021-11-09 PROCEDURE — 90471 IMMUNIZATION ADMIN: CPT | Performed by: FAMILY MEDICINE

## 2021-11-09 PROCEDURE — 90714 TD VACC NO PRESV 7 YRS+ IM: CPT | Performed by: FAMILY MEDICINE

## 2021-11-09 PROCEDURE — 99214 OFFICE O/P EST MOD 30 MIN: CPT | Mod: 25 | Performed by: FAMILY MEDICINE

## 2021-11-09 RX ORDER — KETOCONAZOLE 20 MG/G
CREAM TOPICAL
COMMUNITY
Start: 2021-02-18 | End: 2022-12-29

## 2021-11-09 SDOH — ECONOMIC STABILITY: TRANSPORTATION INSECURITY
IN THE PAST 12 MONTHS, HAS THE LACK OF TRANSPORTATION KEPT YOU FROM MEDICAL APPOINTMENTS OR FROM GETTING MEDICATIONS?: NO

## 2021-11-09 SDOH — ECONOMIC STABILITY: INCOME INSECURITY: HOW HARD IS IT FOR YOU TO PAY FOR THE VERY BASICS LIKE FOOD, HOUSING, MEDICAL CARE, AND HEATING?: NOT HARD AT ALL

## 2021-11-09 SDOH — ECONOMIC STABILITY: TRANSPORTATION INSECURITY
IN THE PAST 12 MONTHS, HAS LACK OF TRANSPORTATION KEPT YOU FROM MEETINGS, WORK, OR FROM GETTING THINGS NEEDED FOR DAILY LIVING?: NO

## 2021-11-09 SDOH — ECONOMIC STABILITY: FOOD INSECURITY: WITHIN THE PAST 12 MONTHS, YOU WORRIED THAT YOUR FOOD WOULD RUN OUT BEFORE YOU GOT MONEY TO BUY MORE.: NEVER TRUE

## 2021-11-09 SDOH — ECONOMIC STABILITY: FOOD INSECURITY: WITHIN THE PAST 12 MONTHS, THE FOOD YOU BOUGHT JUST DIDN'T LAST AND YOU DIDN'T HAVE MONEY TO GET MORE.: NEVER TRUE

## 2021-11-09 SDOH — HEALTH STABILITY: PHYSICAL HEALTH: ON AVERAGE, HOW MANY DAYS PER WEEK DO YOU ENGAGE IN MODERATE TO STRENUOUS EXERCISE (LIKE A BRISK WALK)?: 3 DAYS

## 2021-11-09 SDOH — ECONOMIC STABILITY: INCOME INSECURITY: IN THE LAST 12 MONTHS, WAS THERE A TIME WHEN YOU WERE NOT ABLE TO PAY THE MORTGAGE OR RENT ON TIME?: NO

## 2021-11-09 ASSESSMENT — LIFESTYLE VARIABLES
HOW OFTEN DO YOU HAVE SIX OR MORE DRINKS ON ONE OCCASION: NEVER
HOW MANY STANDARD DRINKS CONTAINING ALCOHOL DO YOU HAVE ON A TYPICAL DAY: PATIENT DECLINED
HOW OFTEN DO YOU HAVE A DRINK CONTAINING ALCOHOL: NEVER

## 2021-11-09 ASSESSMENT — MIFFLIN-ST. JEOR: SCORE: 1647.83

## 2021-11-09 ASSESSMENT — SOCIAL DETERMINANTS OF HEALTH (SDOH)
HOW OFTEN DO YOU GET TOGETHER WITH FRIENDS OR RELATIVES?: ONCE A WEEK
IN A TYPICAL WEEK, HOW MANY TIMES DO YOU TALK ON THE PHONE WITH FAMILY, FRIENDS, OR NEIGHBORS?: MORE THAN THREE TIMES A WEEK
HOW OFTEN DO YOU ATTEND CHURCH OR RELIGIOUS SERVICES?: MORE THAN 4 TIMES PER YEAR
ARE YOU MARRIED, WIDOWED, DIVORCED, SEPARATED, NEVER MARRIED, OR LIVING WITH A PARTNER?: NEVER MARRIED
DO YOU BELONG TO ANY CLUBS OR ORGANIZATIONS SUCH AS CHURCH GROUPS UNIONS, FRATERNAL OR ATHLETIC GROUPS, OR SCHOOL GROUPS?: YES

## 2021-11-09 NOTE — PROGRESS NOTES
"   SUBJECTIVE:   CC: Roxie Vargas is an 61 year old woman who presents for preventive health visit.     Abdominal pain :She reports additional concerns related to right lower abdominal pain that has been occurring for close to a year. She has a limited abdominal ultrasound in Feb. Of 2021, which was negative. She rates the pain at 3/10. She states that is better with standing and occurs primarily when she is sitting. She denies additional pain with coughing or straining. She also denies additional pain with activity. She also denies any urinary symptoms. She reports that she is in the process of attempting to loose weight through exercise and healthy diet. In addition,     Dizziness :she reports occasionally feeling \"light headed\" which she states has been going on for \"a long time.\" Her \"dizzy spells\" occur approximately 1-2 times per month. It is not exacerbated by sudden movement (standing, turning her head etc). Symptoms are relieved when she \"stops and takes a few deep breaths.\" She denies any symptoms of anxiety.  She states no additional concerns at this time.        Patient has been advised of split billing requirements and indicates understanding: Yes  Healthy Habits:     Getting at least 3 servings of Calcium per day:  Yes    Bi-annual eye exam:  Yes    Dental care twice a year:  Yes    Sleep apnea or symptoms of sleep apnea:  None    Diet:  Regular (no restrictions)    Frequency of exercise:  2-3 days/week    Duration of exercise:  45-60 minutes    Taking medications regularly:  Yes    Medication side effects:  Not applicable    PHQ-2 Total Score: 0    Additional concerns today:  No        Current providers sharing in care for this patient include:   Patient Care Team:  Camille Chopra MD as PCP - Sam Rivera MD as MD (Family Practice)  Wen Neri MD as MD (Gastroenterology)  Charity Mahmood MBBS as Assigned Rheumatology Provider  Yaniv Bueno MD as Assigned " Heart and Vascular Provider  Camille Chopra MD as Assigned PCP    Patient has been advised of split billing requirements and indicates understanding: Yes    Today's PHQ-2 Score:   PHQ-2 ( 1999 [employer]) 11/9/2021   Q1: Little interest or pleasure in doing things 0   Q2: Feeling down, depressed or hopeless 0   PHQ-2 Score 0   Q1: Little interest or pleasure in doing things Not at all   Q2: Feeling down, depressed or hopeless Not at all   PHQ-2 Score 0       Abuse: Current or Past (Physical, Sexual or Emotional) - No  Do you feel safe in your environment? Yes    Have you ever done Advance Care Planning? (For example, a Health Directive, POLST, or a discussion with a medical provider or your loved ones about your wishes): No, advance care planning information given to patient to review.  Patient plans to discuss their wishes with loved ones or provider.      Social History     Tobacco Use     Smoking status: Never Smoker     Smokeless tobacco: Never Used   Substance Use Topics     Alcohol use: No     Alcohol/week: 0.0 standard drinks     If you drink alcohol do you typically have >3 drinks per day or >7 drinks per week? Not applicable    Alcohol Use 11/9/2021   Prescreen: >3 drinks/day or >7 drinks/week? Not Applicable   Prescreen: >3 drinks/day or >7 drinks/week? -   No flowsheet data found.    Reviewed orders with patient.  Reviewed health maintenance and updated orders accordingly - Yes  Lab work is in process  Labs reviewed in EPIC    Breast Cancer Screening:  Any new diagnosis of family breast, ovarian, or bowel cancer? No    FHS-7: No flowsheet data found.  click delete button to remove this line now  Follow up mamogram ordered due to finding of dense breast tissue.  Pertinent mammograms are reviewed under the imaging tab.    History of abnormal Pap smear: NO - age 30-65 PAP every 5 years with negative HPV co-testing recommended  PAP / HPV Latest Ref Rng & Units 9/17/2020 12/20/2019 12/20/2017   PAP Negative for  "squamous intraepithelial lesion or malignancy. Negative for squamous intraepithelial lesion or malignancy  Electronically signed by Jose Fernandez CT (ASCP) on 9/25/2020 at  1:49 PM   - -   PAP (Historical) - - NIL NIL   HPV16 NEG Negative Negative Negative   HPV18 NEG Negative Negative Negative   HRHPV NEG Negative Negative Negative     Reviewed and updated as needed this visit by clinical staff    Meds             Past Medical History:   Diagnosis Date     Autoimmune hepatitis (H)      Colitis, ulcerative (H)      Fibroids      Heartburn      Iron deficiency anemia      Ulcer         Review of Systems  CONSTITUTIONAL: NEGATIVE for fever, chills, change in weight  INTEGUMENTARY/SKIN: NEGATIVE for worrisome rashes, moles or lesions  EYES: NEGATIVE for vision changes or irritation  ENT: NEGATIVE for ear, mouth and throat problems  RESP: NEGATIVE for significant cough or SOB  BREAST: NEGATIVE for masses, tenderness or discharge  CV: NEGATIVE for chest pain, palpitations or peripheral edema  GI: NEGATIVE for nausea,  heartburn, or change in bowel habits. 3/10 abdominal pain in RLQ-chronic.  : NEGATIVE for unusual urinary or vaginal symptoms. No vaginal bleeding.  MUSCULOSKELETAL: NEGATIVE for significant arthralgias or myalgia  NEURO: NEGATIVE for weakness, dizziness or paresthesias  PSYCHIATRIC: NEGATIVE for changes in mood or affect      OBJECTIVE:   /64 (BP Location: Left arm, Patient Position: Sitting, Cuff Size: Adult Large)   Pulse 72   Ht 1.619 m (5' 3.75\")   Wt 109.7 kg (241 lb 12.8 oz)   BMI 41.83 kg/m    Physical Exam  GENERAL APPEARANCE: healthy, alert and no distress  EYES: Eyes grossly normal to inspection, PERRL and conjunctivae and sclerae normal  HENT: ear canals and TM's normal, nose and mouth without ulcers or lesions, oropharynx clear and oral mucous membranes moist  NECK: no adenopathy, no asymmetry, masses, or scars and thyroid normal to palpation  RESP: lungs clear to " auscultation - no rales, rhonchi or wheezes  BREAST: Deferred.  CV: regular rate and rhythm, normal S1 S2, no S3 or S4, no murmur, click or rub, no peripheral edema and peripheral pulses strong  ABDOMEN: soft, nontender, no hepatosplenomegaly, no masses and bowel sounds normal  MS: no musculoskeletal defects are noted and gait is age appropriate without ataxia  SKIN: no suspicious lesions or rashes  NEURO: Normal strength and tone, sensory exam grossly normal, mentation intact and speech normal  PSYCH: mentation appears normal and affect normal/bright    Diagnostic Test Results:  Labs reviewed in Epic    ASSESSMENT/PLAN:     Roxie was seen today for physical.    Diagnoses and all orders for this visit:    Encounter for routine adult medical exam with abnormal findings  Comments:  Complete physical exam with approriated anticipatory guidence completed. Screening and condition specific labs/diagnostics ordered today.  Orders:  -     REVIEW OF HEALTH MAINTENANCE PROTOCOL ORDERS  -     Cancel: INFLUENZA QUAD, RECOMBINANT, P-FREE (RIV4) (FLUBLOK)  -     TD (ADULT, 7+) PRESERVE FREE    Autoimmune hepatitis (H)  Comments:  Continue current medicaiton regimine. Rechecking LFTs today and will follow up as dictated by the test results.  Orders:  -     Cancel: Comprehensive metabolic panel (BMP + Alb, Alk Phos, ALT, AST, Total. Bili, TP); Future  -     Hepatic panel    Visit for screening mammogram  Comments:  Due to high breast tissue density on previous (1 year ago) follow up screening reccomended at this time. Order/referal placed.  Orders:  -     MA SCREENING DIGITAL BILAT - Future  (s+30); Future    Lipid screening  Comments:  Lipid screening ordered at this visit. Will follow up/treat as dictated by the test results.  Orders:  -     Lipid Profile; Future    Prediabetes  Comments:  A1C ordered at this visit. Will follow up/treat as dictated by the test results.  Orders:  -     Hemoglobin A1c; Future  -     UA Macro with  "Reflex to Micro and Culture - lab collect; Future    Abdominal pain, right lower quadrant  Comments:  Ongoing problem. Not likely organ related. Consider stretches and application of heat/cold as it is likely musculoskeletal in nature.  Orders:  -     UA Macro with Reflex to Micro and Culture - lab collect; Future    Dizziness  Comments:  Ongroing problem. BP normal, previous Hgb low normal. Possibly anxiety related. Suggest keeping log of episodes to see if they are related to any particular fox    Patient has been advised of split billing requirements and indicates understanding: Yes  COUNSELING:  Reviewed preventive health counseling, as reflected in patient instructions       Regular exercise       Healthy diet/nutrition       Vision screening    Estimated body mass index is 43.6 kg/m  as calculated from the following:    Height as of 7/23/21: 1.626 m (5' 4\").    Weight as of 7/23/21: 115.2 kg (254 lb).    Weight management plan: Discussed healthy diet and exercise guidelines    She reports that she has never smoked. She has never used smokeless tobacco.      Counseling Resources:  ATP IV Guidelines  Pooled Cohorts Equation Calculator  Breast Cancer Risk Calculator  BRCA-Related Cancer Risk Assessment: FHS-7 Tool  FRAX Risk Assessment  ICSI Preventive Guidelines  Dietary Guidelines for Americans, 2010  USDA's MyPlate  ASA Prophylaxis  Lung CA Screening    Camille Chopra MD  [facility]  "

## 2021-11-16 ENCOUNTER — LAB (OUTPATIENT)
Dept: LAB | Facility: CLINIC | Age: 61
End: 2021-11-16
Payer: COMMERCIAL

## 2021-11-16 DIAGNOSIS — R73.03 PREDIABETES: ICD-10-CM

## 2021-11-16 DIAGNOSIS — Z13.220 LIPID SCREENING: ICD-10-CM

## 2021-11-16 DIAGNOSIS — R10.31 ABDOMINAL PAIN, RIGHT LOWER QUADRANT: ICD-10-CM

## 2021-11-16 LAB
ALBUMIN SERPL-MCNC: 3.8 G/DL (ref 3.5–5)
ALBUMIN UR-MCNC: NEGATIVE MG/DL
ALP SERPL-CCNC: 80 U/L (ref 45–120)
ALT SERPL W P-5'-P-CCNC: <9 U/L (ref 0–45)
APPEARANCE UR: CLEAR
AST SERPL W P-5'-P-CCNC: 12 U/L (ref 0–40)
BACTERIA #/AREA URNS HPF: ABNORMAL /HPF
BILIRUB DIRECT SERPL-MCNC: 0.2 MG/DL
BILIRUB SERPL-MCNC: 0.5 MG/DL (ref 0–1)
BILIRUB UR QL STRIP: NEGATIVE
CHOLEST SERPL-MCNC: 194 MG/DL
COLOR UR AUTO: YELLOW
FASTING STATUS PATIENT QL REPORTED: YES
GLUCOSE UR STRIP-MCNC: NEGATIVE MG/DL
HBA1C MFR BLD: 5.7 % (ref 0–5.6)
HDLC SERPL-MCNC: 53 MG/DL
HGB UR QL STRIP: NEGATIVE
KETONES UR STRIP-MCNC: NEGATIVE MG/DL
LDLC SERPL CALC-MCNC: 129 MG/DL
LEUKOCYTE ESTERASE UR QL STRIP: ABNORMAL
NITRATE UR QL: NEGATIVE
PH UR STRIP: 7 [PH] (ref 5–8)
PROT SERPL-MCNC: 7.5 G/DL (ref 6–8)
RBC #/AREA URNS AUTO: ABNORMAL /HPF
SP GR UR STRIP: 1.02 (ref 1–1.03)
SQUAMOUS #/AREA URNS AUTO: ABNORMAL /LPF
TRIGL SERPL-MCNC: 58 MG/DL
UROBILINOGEN UR STRIP-ACNC: 0.2 E.U./DL
WBC #/AREA URNS AUTO: ABNORMAL /HPF

## 2021-11-16 PROCEDURE — 36415 COLL VENOUS BLD VENIPUNCTURE: CPT | Performed by: FAMILY MEDICINE

## 2021-11-16 PROCEDURE — 80061 LIPID PANEL: CPT

## 2021-11-16 PROCEDURE — 83036 HEMOGLOBIN GLYCOSYLATED A1C: CPT

## 2021-11-16 PROCEDURE — 81001 URINALYSIS AUTO W/SCOPE: CPT

## 2021-11-16 PROCEDURE — 80076 HEPATIC FUNCTION PANEL: CPT | Performed by: FAMILY MEDICINE

## 2021-11-17 DIAGNOSIS — K75.4 AUTOIMMUNE HEPATITIS (H): Primary | ICD-10-CM

## 2021-11-29 ENCOUNTER — TELEPHONE (OUTPATIENT)
Dept: GASTROENTEROLOGY | Facility: CLINIC | Age: 61
End: 2021-11-29
Payer: COMMERCIAL

## 2021-11-29 NOTE — TELEPHONE ENCOUNTER
Called patient.  Let her know labs continue to look good.  Per Grete, she is to discontinue azathioprine.  Patient verbalized understanding and had no further questions.    Serene BORREGO LPN  Hepatology Clinic

## 2021-12-16 ENCOUNTER — HOSPITAL ENCOUNTER (OUTPATIENT)
Dept: MAMMOGRAPHY | Facility: CLINIC | Age: 61
Discharge: HOME | End: 2021-12-16
Attending: FAMILY MEDICINE | Admitting: FAMILY MEDICINE
Payer: COMMERCIAL

## 2021-12-16 DIAGNOSIS — Z12.31 VISIT FOR SCREENING MAMMOGRAM: ICD-10-CM

## 2021-12-16 PROCEDURE — 77067 SCR MAMMO BI INCL CAD: CPT

## 2022-01-26 ENCOUNTER — LAB (OUTPATIENT)
Dept: FAMILY MEDICINE | Facility: CLINIC | Age: 62
End: 2022-01-26
Attending: FAMILY MEDICINE
Payer: COMMERCIAL

## 2022-01-26 DIAGNOSIS — Z20.822 SUSPECTED 2019 NOVEL CORONAVIRUS INFECTION: ICD-10-CM

## 2022-01-26 LAB — SARS-COV-2 RNA RESP QL NAA+PROBE: NORMAL

## 2022-01-26 PROCEDURE — 99000 SPECIMEN HANDLING OFFICE-LAB: CPT

## 2022-01-26 PROCEDURE — U0005 INFEC AGEN DETEC AMPLI PROBE: HCPCS | Mod: 90

## 2022-01-26 PROCEDURE — U0003 INFECTIOUS AGENT DETECTION BY NUCLEIC ACID (DNA OR RNA); SEVERE ACUTE RESPIRATORY SYNDROME CORONAVIRUS 2 (SARS-COV-2) (CORONAVIRUS DISEASE [COVID-19]), AMPLIFIED PROBE TECHNIQUE, MAKING USE OF HIGH THROUGHPUT TECHNOLOGIES AS DESCRIBED BY CMS-2020-01-R: HCPCS | Mod: 90

## 2022-01-27 LAB — SARS-COV-2 RNA RESP QL NAA+PROBE: NOT DETECTED

## 2022-04-28 ENCOUNTER — OFFICE VISIT (OUTPATIENT)
Dept: VASCULAR SURGERY | Facility: CLINIC | Age: 62
End: 2022-04-28
Payer: COMMERCIAL

## 2022-04-28 VITALS
HEIGHT: 64 IN | WEIGHT: 241 LBS | DIASTOLIC BLOOD PRESSURE: 70 MMHG | HEART RATE: 76 BPM | TEMPERATURE: 98.8 F | RESPIRATION RATE: 18 BRPM | BODY MASS INDEX: 41.15 KG/M2 | SYSTOLIC BLOOD PRESSURE: 120 MMHG

## 2022-04-28 DIAGNOSIS — R20.9 SENSATION OF COLD IN LOWER EXTREMITY: ICD-10-CM

## 2022-04-28 DIAGNOSIS — K75.4 AUTOIMMUNE HEPATITIS (H): Primary | ICD-10-CM

## 2022-04-28 PROCEDURE — 99214 OFFICE O/P EST MOD 30 MIN: CPT | Performed by: INTERNAL MEDICINE

## 2022-04-28 PROCEDURE — G0463 HOSPITAL OUTPT CLINIC VISIT: HCPCS

## 2022-04-28 ASSESSMENT — PAIN SCALES - GENERAL: PAINLEVEL: NO PAIN (0)

## 2022-04-28 NOTE — PROGRESS NOTES
[facility]        Patient is here for a 1 year follow up  to discuss sensation of cold in shin. No changes in this feeling. Does not wear compressions.     No concerns today.     Refills are needed: No    Has homecare services and agency name:  Priscilla Ramirez, RN

## 2022-04-28 NOTE — PROGRESS NOTES
[facility] VASCULAR CLINIC  Yaniv Bueno MD - Vascular Medicine Progress Note    LOCATION: [facility] Vascular [facility]    Roxie Vargas  Medical Record #:  [id number]  YOB: 1960  Age:  61 year old     Date of Service: 4/28/2022     PRIMARY CARE PROVIDER: Camille Chopra    REASON FOR VISIT:   follow up  for radiculopathy    IMPRESSION:  Patient is here for a 1 year follow up  to discuss sensation of cold in shin. No changes in this feeling. Does not wear compressions.       RECOMMENDATION: We referred the patient for PT service for radiculopathy    If patients imaging is normal patient should follow up as needed    HPI: Roxie PfeifferPatient is here for a 1 year follow up  to discuss sensation of cold in shin. No changes in this feeling. Does not wear compressions.  Patient's MRI done a year ago showed evidence of radiculopathy.  No evidence of PAD    PAST MEDICAL HISTORY  Past Medical History:   Diagnosis Date     Autoimmune hepatitis (H)      Colitis, ulcerative (H)      Fibroids      Heartburn      Iron deficiency anemia      Ulcer        PAST SURGICAL HISTORY:  Past Surgical History:   Procedure Laterality Date     COLONOSCOPY  4/1/2013    Procedure: COLONOSCOPY;;  Surgeon: Tobias Merchant MD;  Location:  GI     dilation & curretage         uterine fibroids removed[  2005     wisdom teeth[           CURRENT MEDICATIONS  ascorbic acid (VITAMIN C) 1000 MG TABS, Take 1,000 mg by mouth daily.  calcium citrate and vitamin D (CITRACAL) 200-250 MG-UNIT TABS per tablet, Take 1 tablet by mouth 2 times daily  carboxymethylcellulose (CARBOXYMETHYLCELLULOSE SODIUM) 0.5 % SOLN ophthalmic solution, Place 1-2 drops into both eyes 3 times daily as needed for dry eyes  Cholecalciferol (VITAMIN D PO), Take 2,000 Units by mouth daily   Ferrous Sulfate (IRON CR PO), Take 1 tablet by mouth daily.  ibuprofen (ADVIL,MOTRIN) 600 MG tablet, Take 600 mg by mouth every 6 hours as needed.  ketoconazole  (NIZORAL) 2 % external cream, APPLY EXTERNALLY TO FACE EVERY NIGHT AT BEDTIME AS NEEDED  multivitamin (CENTRUM SILVER) tablet, Take 1 tablet by mouth daily  olopatadine (PATADAY) 0.2 % ophthalmic solution, Place 1 drop into both eyes daily    No current facility-administered medications on file prior to visit.      ALLERGIES     Allergies   Allergen Reactions     Shrimp Anaphylaxis     Aspirin Other (See Comments)     Watery eyes. Happened a long time ago.        FAMILY HISTORY  Family History   Problem Relation Age of Onset     Asthma Sister      Peptic Ulcer Disease Sister         and 2 nephews     Hypertension Mother        SOCIAL HISTORY  Social History     Socioeconomic History     Marital status: Single     Spouse name: Not on file     Number of children: Not on file     Years of education: Not on file     Highest education level: Not on file   Occupational History     Occupation: Medical Records     Employer: [employer]   Tobacco Use     Smoking status: Never Smoker     Smokeless tobacco: Never Used   Substance and Sexual Activity     Alcohol use: No     Alcohol/week: 0.0 standard drinks     Drug use: No     Sexual activity: Not Currently   Other Topics Concern     Parent/sibling w/ CABG, MI or angioplasty before 65F 55M? Not Asked   Social History Narrative    Immigrated from [place] 1982.             How much exercise per week? Daily activities    How much calcium per day? supplements       How much caffeine per day? Tea 1     How much vitamin D per day? In supplements    Do you/your family wear seatbelts?  Yes    Do you/your family use safety helmets? Yes    Do you/your family use sunscreen? Yes    Do you/your family keep firearms in the home? No    Do you/your family have a smoke detector(s)? Yes            Reviewed cmckim lpn 12-     Social Determinants of Health     Financial Resource Strain: Low Risk      Difficulty of Paying Living Expenses: Not hard at all   Food Insecurity: No  "Food Insecurity     Worried About Running Out of Food in the Last Year: Never true     Ran Out of Food in the Last Year: Never true   Transportation Needs: No Transportation Needs     Lack of Transportation (Medical): No     Lack of Transportation (Non-Medical): No   Physical Activity: Unknown     Days of Exercise per Week: 3 days     Minutes of Exercise per Session: Not on file   Stress: No Stress Concern Present     Feeling of Stress : Not at all   Social Connections: Moderately Integrated     Frequency of Communication with Friends and Family: More than three times a week     Frequency of Social Gatherings with Friends and Family: Once a week     Attends Nondenominational Services: More than 4 times per year     Active Member of Clubs or Organizations: Yes     Attends Club or Organization Meetings: Not on file     Marital Status: Never    Intimate Partner Violence: Not on file   Housing Stability: Low Risk      Unable to Pay for Housing in the Last Year: No     Number of Places Lived in the Last Year: 1     Unstable Housing in the Last Year: No       ROS:   Complete ROS negative other than what is stated in HPI.     EXAM:  /70   Pulse 76   Temp 98.8  F (37.1  C)   Resp 18   Ht 5' 4\" (1.626 m)   Wt 241 lb (109.3 kg)   BMI 41.37 kg/m    In general, the patient is a pleasant female in no apparent distress.    HEENT: NC/AT.  PERRLA.  EOMI.  Sclerae white, not injected.    Neck: No adenopathy.  Carotids +2/2 bilaterally without bruits.   Heart: RRR. Normal S1, S2 splits physiologically. No murmur, rub, click, or gallop.   Lungs: CTA.  No ronchi, wheezes, rales.    Abdomen: Soft, nontender, nondistended.   Extremities: No clubbing, cyanosis, or edema.  No wounds.     Labs:  LIPID RESULTS:  Lab Results   Component Value Date    CHOL 194 11/16/2021    CHOL 206 (H) 12/27/2016    HDL 53 11/16/2021    HDL 67 12/27/2016     11/16/2021     (H) 12/27/2016    TRIG 58 11/16/2021    TRIG 73 12/27/2016    " CHOLHDLRATIO 3.7 10/19/2011       LIVER ENZYME RESULTS:  Lab Results   Component Value Date    AST 12 11/16/2021    AST 9 06/26/2020    ALT <9 11/16/2021    ALT 16 06/26/2020       CBC RESULTS:  Lab Results   Component Value Date    WBC 6.6 07/15/2021    WBC 6.5 06/26/2020    RBC 4.20 07/15/2021    RBC 4.25 06/26/2020    HGB 11.3 (L) 07/15/2021    HGB 11.4 (L) 06/26/2020    HCT 35.6 07/15/2021    HCT 37.1 06/26/2020    MCV 85 07/15/2021    MCV 87 06/26/2020    MCH 26.9 07/15/2021    MCH 26.8 06/26/2020    MCHC 31.7 07/15/2021    MCHC 30.7 (L) 06/26/2020    RDW 15.2 (H) 07/15/2021    RDW 15.1 (H) 06/26/2020     07/15/2021     06/26/2020       BMP RESULTS:  Lab Results   Component Value Date     07/15/2021     06/26/2020    POTASSIUM 3.9 07/15/2021    POTASSIUM 3.5 06/26/2020    CHLORIDE 111 (H) 07/15/2021    CHLORIDE 107 06/26/2020    CO2 25 07/15/2021    CO2 26 06/26/2020    ANIONGAP 5 07/15/2021    ANIONGAP 5 06/26/2020    GLC 96 07/15/2021     (H) 06/26/2020    BUN 7 (L) 07/15/2021    BUN 9 06/26/2020    CR 0.72 07/15/2021    CR 0.67 06/26/2020    GFRESTIMATED >90 07/15/2021    GFRESTIMATED >90 06/26/2020    GFRESTBLACK >90 06/26/2020    JERMAN 9.4 07/15/2021    JERMAN 8.8 06/26/2020        A1C RESULTS:  Lab Results   Component Value Date    A1C 5.7 (H) 11/16/2021    A1C 5.8 05/01/2015       THYROID RESULTS:  Lab Results   Component Value Date    TSH 2.10 11/10/2015         DIAGNOSTIC STUDIES:     Images:  No results found.    .      Yaniv Bueno MD        20 minutes spent on the date of the encounter doing chart review, history and exam, documentation, and further activities as noted above.

## 2022-04-28 NOTE — PATIENT INSTRUCTIONS
Please call our Vascular [facility] [place] if you have any questions or concerns. This will be the best way to contact Dr. Bueno or his nurse.     Dr. Yaniv Bueno, Vascular Medicine  Phone: [phone]  Fax: [phone]  ARSALAN Case Manager: Jodi Monge

## 2022-05-12 ENCOUNTER — OFFICE VISIT (OUTPATIENT)
Dept: FAMILY MEDICINE | Facility: CLINIC | Age: 62
End: 2022-05-12
Payer: COMMERCIAL

## 2022-05-12 VITALS
DIASTOLIC BLOOD PRESSURE: 70 MMHG | WEIGHT: 237.8 LBS | BODY MASS INDEX: 40.82 KG/M2 | SYSTOLIC BLOOD PRESSURE: 116 MMHG | HEART RATE: 72 BPM

## 2022-05-12 DIAGNOSIS — K21.00 GASTROESOPHAGEAL REFLUX DISEASE WITH ESOPHAGITIS WITHOUT HEMORRHAGE: Primary | ICD-10-CM

## 2022-05-12 DIAGNOSIS — R10.2 ACUTE SUPRAPUBIC PAIN: ICD-10-CM

## 2022-05-12 DIAGNOSIS — D50.8 OTHER IRON DEFICIENCY ANEMIA: ICD-10-CM

## 2022-05-12 DIAGNOSIS — K75.4 AUTOIMMUNE HEPATITIS (H): ICD-10-CM

## 2022-05-12 DIAGNOSIS — L29.9 PRURITIC CONDITION: ICD-10-CM

## 2022-05-12 DIAGNOSIS — R10.31 RLQ ABDOMINAL PAIN: ICD-10-CM

## 2022-05-12 DIAGNOSIS — L72.9 BENIGN SKIN CYST: ICD-10-CM

## 2022-05-12 PROBLEM — M65.90 SYNOVITIS AND TENOSYNOVITIS: Status: RESOLVED | Noted: 2017-01-02 | Resolved: 2022-05-12

## 2022-05-12 LAB
ALBUMIN SERPL-MCNC: 3.8 G/DL (ref 3.5–5)
ALBUMIN UR-MCNC: NEGATIVE MG/DL
ALP SERPL-CCNC: 74 U/L (ref 45–120)
ALT SERPL W P-5'-P-CCNC: <9 U/L (ref 0–45)
ANION GAP SERPL CALCULATED.3IONS-SCNC: 10 MMOL/L (ref 5–18)
APPEARANCE UR: CLEAR
AST SERPL W P-5'-P-CCNC: 12 U/L (ref 0–40)
BILIRUB SERPL-MCNC: 0.4 MG/DL (ref 0–1)
BILIRUB UR QL STRIP: NEGATIVE
BUN SERPL-MCNC: 8 MG/DL (ref 8–22)
CALCIUM SERPL-MCNC: 9 MG/DL (ref 8.5–10.5)
CHLORIDE BLD-SCNC: 106 MMOL/L (ref 98–107)
CO2 SERPL-SCNC: 25 MMOL/L (ref 22–31)
COLOR UR AUTO: YELLOW
CREAT SERPL-MCNC: 0.74 MG/DL (ref 0.6–1.1)
GFR SERPL CREATININE-BSD FRML MDRD: >90 ML/MIN/1.73M2
GLUCOSE BLD-MCNC: 77 MG/DL (ref 70–125)
GLUCOSE UR STRIP-MCNC: NEGATIVE MG/DL
HGB BLD-MCNC: 11.3 G/DL (ref 11.7–15.7)
HGB UR QL STRIP: NEGATIVE
KETONES UR STRIP-MCNC: NEGATIVE MG/DL
LEUKOCYTE ESTERASE UR QL STRIP: NEGATIVE
NITRATE UR QL: NEGATIVE
PH UR STRIP: 7 [PH] (ref 5–8)
POTASSIUM BLD-SCNC: 4 MMOL/L (ref 3.5–5)
PROT SERPL-MCNC: 7.3 G/DL (ref 6–8)
SODIUM SERPL-SCNC: 141 MMOL/L (ref 136–145)
SP GR UR STRIP: 1.01 (ref 1–1.03)
UROBILINOGEN UR STRIP-ACNC: 0.2 E.U./DL

## 2022-05-12 PROCEDURE — 80053 COMPREHEN METABOLIC PANEL: CPT | Performed by: FAMILY MEDICINE

## 2022-05-12 PROCEDURE — 81003 URINALYSIS AUTO W/O SCOPE: CPT | Performed by: FAMILY MEDICINE

## 2022-05-12 PROCEDURE — 11401 EXC TR-EXT B9+MARG 0.6-1 CM: CPT | Performed by: FAMILY MEDICINE

## 2022-05-12 PROCEDURE — 99214 OFFICE O/P EST MOD 30 MIN: CPT | Mod: 25 | Performed by: FAMILY MEDICINE

## 2022-05-12 PROCEDURE — 36415 COLL VENOUS BLD VENIPUNCTURE: CPT | Performed by: FAMILY MEDICINE

## 2022-05-12 PROCEDURE — 85018 HEMOGLOBIN: CPT | Performed by: FAMILY MEDICINE

## 2022-05-12 RX ORDER — SUCRALFATE 1 G/1
1 TABLET ORAL 2 TIMES DAILY
Qty: 60 TABLET | Refills: 0 | Status: SHIPPED | OUTPATIENT
Start: 2022-05-12 | End: 2022-06-08

## 2022-05-12 NOTE — PROGRESS NOTES
Assessment/plan   Roxie Vargas is a 61 year old female who is  establish patient to my practice here with chief complaint     Patient presents with:  Abdominal Pain: Started a couple weeks ago with vaginal itching, which has gotten better.  Now she is complaining of lower abdominal discomfort and feeling weak yesterday.       Roxie was seen today for abdominal pain.    Diagnoses and all orders for this visit:    Gastroesophageal reflux disease with esophagitis without hemorrhage  Restart omeprazole 1 tablet in the morning and using sucralfate 2-3 times a day with the meals  -     sucralfate (CARAFATE) 1 GM tablet; Take 1 tablet (1 g) by mouth 2 times daily  -     omeprazole (PRILOSEC) 20 MG DR capsule; Take 1 capsule (20 mg) by mouth daily    Acute suprapubic pain  Patient urine is completely negative for infection or any other abnormal finding advised on maybe putting some ice pack some gentle stretches and cases of pelvic floor muscle strain  -     UA macro with reflex to Microscopic and Culture - Clinc Collect    RLQ abdominal pain  Comments:  like to rule put flare up of hepatitis as well as UTI.  UA is negative for infection we will wait on the hepatitis panel  Orders:  -     UA macro with reflex to Microscopic and Culture - Clinc Collect  -     Comprehensive metabolic panel (BMP + Alb, Alk Phos, ALT, AST, Total. Bili, TP); Future  -     Comprehensive metabolic panel (BMP + Alb, Alk Phos, ALT, AST, Total. Bili, TP)    Autoimmune hepatitis (H)  -     Comprehensive metabolic panel (BMP + Alb, Alk Phos, ALT, AST, Total. Bili, TP); Future  -     Comprehensive metabolic panel (BMP + Alb, Alk Phos, ALT, AST, Total. Bili, TP)    Pruritic condition  Continue to have a lot of itching all over the body will refer her to allergy specialist in case we figure out any allergens which could be causing the symptoms  -     Adult Allergy/Asthma Referral; Future    Other iron deficiency anemia  -     Hemoglobin; Future  -      Hemoglobin    Benign skin cyst  Comments:  mucoid cyst removal from the base left thumb present for last 10 yr, quite bother the patient   PROCEDURE NOTE:  ebaceous Cyst Excision Procedure Note      Pre-operative Diagnosis: Epidermal cyst      Post-operative Diagnosis: same      Locations: base of left thumb skin measure 0.6X1.2 cm no sign of infection       Indications: cosmetic       Anesthesia: 1% lidocaine with epi       Procedure Details   History of allergy to iodine: no      Patient informed of the risks (including bleeding and infection) and benefits of the   procedure and verbal informed consent obtained.      The lesion and surrounding area was given a sterile prep using alcohol  and draped in the usual sterile fashion. An incision was made over the cyst, which was dissected free of the surrounding tissue and removed.  The cyst was filled  muscoid  material.  The wound was closed with pressure dressing . Antibiotic ointment and a sterile dressing applied.  The specimen was not sent for pathologic examination. The patient tolerated the procedure well.      EBL: 1 ml      Findings:  Mucoid cyst       Condition:  Stable      Complications:  none.      Plan:  1. Instructed to keep the wound dry and covered for 24-48h and clean thereafter.  2. Warning signs of infection were reviewed.    3. Recommended that the patient use NSAID as needed for pain.   Advised to call back directly if there are further questions, or if these symptoms fail to improve as anticipated or worsen.                      Subjective:      HPI: Roxie Vargas is a 61 year old female is here for.    ABDOMINAL PAIN (and/or Flank Pain)    Onset: 2 week(s) ago    Description:   Location: right lower quadrant suprapubic region  Radiation: None  Character: Burning, Fullness and Cramping  Frequency (if intermittent):feel discomfort is always         Pain-free between episodes: no    History:    Previous similar pain? no   Previous tests done?  CT  Any related trauma?: no    Accompanying Signs & Symptoms:   Fever? no  Nausea no  Vomiting (bloody?, coffee-grounds?) no  Dysuria? no  Hematuria: no  Change in stool color: no  Change in stool pattern: no  Weight loss: no    Precipitating and/or Alleviating factors:   Does the pain change with: Food no                                                BM no                                                Body movement no                                                 Urination no  Possibility of Pregnancy: no ; No LMP recorded. Patient is postmenopausal.  Therapies tried and outcome: None                    I have personally reviewed the patient's allergies, medications, past medical history, family history, social history, rooming notes and problem list in detail and updated the patient record as necessary.      Past Medical History:   Diagnosis Date     Autoimmune hepatitis (H)      Colitis, ulcerative (H)      Fibroids      Heartburn      Iron deficiency anemia      Ulcer      Past Surgical History:   Procedure Laterality Date     COLONOSCOPY  4/1/2013    Procedure: COLONOSCOPY;;  Surgeon: Tobias Merchant MD;  Location:  GI     dilation & curretage         uterine fibroids removed[  2005     wisdom teeth[       Shrimp and Aspirin  Current Outpatient Medications   Medication Sig Dispense Refill     ascorbic acid (VITAMIN C) 1000 MG TABS Take 1,000 mg by mouth daily.       calcium citrate and vitamin D (CITRACAL) 200-250 MG-UNIT TABS per tablet Take 1 tablet by mouth 2 times daily       carboxymethylcellulose (CARBOXYMETHYLCELLULOSE SODIUM) 0.5 % SOLN ophthalmic solution Place 1-2 drops into both eyes 3 times daily as needed for dry eyes 1 Bottle 3     Ferrous Sulfate (IRON CR PO) Take 1 tablet by mouth daily.       ibuprofen (ADVIL,MOTRIN) 600 MG tablet Take 600 mg by mouth every 6 hours as needed.       ketoconazole (NIZORAL) 2 % external cream APPLY EXTERNALLY TO FACE EVERY NIGHT AT BEDTIME AS NEEDED        multivitamin (CENTRUM SILVER) tablet Take 1 tablet by mouth daily       olopatadine (PATADAY) 0.2 % ophthalmic solution Place 1 drop into both eyes daily 1 Bottle 11     omeprazole (PRILOSEC) 20 MG DR capsule Take 1 capsule (20 mg) by mouth daily 60 capsule 1     sucralfate (CARAFATE) 1 GM tablet Take 1 tablet (1 g) by mouth 2 times daily 60 tablet 0     Family History   Problem Relation Age of Onset     Asthma Sister      Peptic Ulcer Disease Sister         and 2 nephews     Hypertension Mother        Patient Active Problem List   Diagnosis     Anemia     Astigmatism     Myopia     Bilateral chronic knee pain     Autoimmune hepatitis (H)     Pain in thumb joint with movement of left hand     Radial styloid tenosynovitis     Morbid obesity (H)     BMI 40.0-44.9, adult (H)     Gastroesophageal reflux disease with esophagitis without hemorrhage       Review of Systems   12 point comprehensive review of systems was negative except as noted and HPI     Social History     Social History Narrative    Immigrated from [facility] [place].             How much exercise per week? Daily activities    How much calcium per day? supplements       How much caffeine per day? Tea 1     How much vitamin D per day? In supplements    Do you/your family wear seatbelts?  Yes    Do you/your family use safety helmets? Yes    Do you/your family use sunscreen? Yes    Do you/your family keep firearms in the home? No    Do you/your family have a smoke detector(s)? Yes            Reviewed cmckim lpn 12-       Objective:     Vitals:    05/12/22 1214   BP: 116/70   Pulse: 72   Weight: 107.9 kg (237 lb 12.8 oz)       Physical Exam:   Physical Exam:  General Appearance:  Appears comfortable, Alert, cooperative, no distress,   Head: Normocephalic, without obvious abnormality, atraumatic  Eyes: PERRL, conjunctiva/corneas clear, EOM's intact, both eyes             Nose: Nares normal, no drainage   Throat: Lips, mucosa, and tongue normal; teeth and  gums normal  Neck: Supple, symmetrical, trachea midline, no adenopathy;                      Lungs: Clear to auscultation bilaterally, respirations unlabored  Heart: Regular rate and rhythm, S1 and S2 normal, no murmur, rubs or gallop  Extremities: Extremities normal, atraumatic, no cyanosis or edema  Pulses: DP pulses are 1-2+ bilat.    Skin: mucoid cyst on the base of the left thumb        25 minutes spent on the day of encounter doing chart review, history and exam, documentation, and further activities as noted.     This note has been dictated using voice recognition software. Any grammatical or context distortions are unintentional and inherent to the software    Camille Chopra MD     There are no Patient Instructions on file for this visit.

## 2022-06-01 ENCOUNTER — HOSPITAL ENCOUNTER (OUTPATIENT)
Dept: PHYSICAL THERAPY | Facility: REHABILITATION | Age: 62
Discharge: HOME | End: 2022-06-01
Attending: INTERNAL MEDICINE
Payer: COMMERCIAL

## 2022-06-01 DIAGNOSIS — G89.29 CHRONIC MIDLINE LOW BACK PAIN WITHOUT SCIATICA: Primary | ICD-10-CM

## 2022-06-01 DIAGNOSIS — M54.50 CHRONIC MIDLINE LOW BACK PAIN WITHOUT SCIATICA: Primary | ICD-10-CM

## 2022-06-01 DIAGNOSIS — R20.9 SENSATION OF COLD IN LOWER EXTREMITY: ICD-10-CM

## 2022-06-01 PROCEDURE — 97110 THERAPEUTIC EXERCISES: CPT | Mod: GP

## 2022-06-01 PROCEDURE — 97161 PT EVAL LOW COMPLEX 20 MIN: CPT | Mod: GP

## 2022-06-01 NOTE — PROGRESS NOTES
"   06/01/22 1100   General Information   Type of Visit Initial OP Ortho PT Evaluation   Start of Care Date 06/01/22   Referring Physician Dr. Yaniv Bueno MD   Patient/Family Goals Statement \"strengthen back\"   Orders Evaluate and Treat   Orders Comment Referred and Radicular pain , L4.L5 dermatomal pain   Date of Order 04/28/22   Certification Required? No   Medical Diagnosis Sensation of cold in lower extremity   Surgical/Medical history reviewed Yes   Presentation and Etiology   Pertinent history of current problem (include personal factors and/or comorbidities that impact the POC) Roxie said that she gets a sensation of cold on her left > right lateral lower legs that started about 10 years ago. Her vascular doctor ruled out PAD and could not determine any issues in the LE that could be causing the sensation. He referred her physical therapy. She had imaging done her back that shows some joint space narrowing, however, her back only bothers after standing for 30 to 60 minutes, and her pain is mild. She denies any pain in the legs. She denies any bowel or bladder changes. She reports B knee OA that only bothers her when she goes up stairs.   Impairments A. Pain;E. Decreased flexibility;F. Decreased strength and endurance;R. Other   Impairment comment Sensation of \"cold\" in B lateral lower leg   Functional Limitations perform required work activities   Symptom Location   (See history)   How/Where did it occur From insidious onset   Onset date of current episode/exacerbation 04/28/22   Chronicity Chronic   Pain rating (0-10 point scale) Best (/10);Worst (/10);Other   Best (/10) 0/10   Worst (/10) 2/10   Pain rating comment 0/10   Pain quality C. Aching   Frequency of pain/symptoms D. Other   Pain frequency comment Only after standing for 30+ minutes.   Pain/symptoms exacerbated by A. Sitting   Pain/symptoms eased by F. Certain positions;K. Other   Pain eased by comment Standing or laying down relieves the " sensation of cold.   Progression of symptoms since onset: Unchanged   Prior Level of Function   Prior Level of Function-Mobility Independent   Prior Level of Function-ADLs Independent   Fall Risk Screen   Fall screen completed by PT   Have you fallen 2 or more times in the past year? No   Have you fallen and had an injury in the past year? No   Is patient a fall risk? No   Abuse Screen (yes response referral indicated)   Feels Unsafe at Home or Work/School no   Feels Threatened by Someone no   Does Anyone Try to Keep You From Having Contact with Others or Doing Things Outside Your Home? no   Physical Signs of Abuse Present no   Patient needs abuse support services and resources No   System Outcome Measures   Outcome Measures   (LEFS: 70/80)   Lumbar Spine/SI Objective Findings   Flexion ROM WNL   Extension ROM WNL   Right Side Bending ROM WNL   Left Side Bending ROM WNL   Lumbar ROM Comment B rotation: WNL   Hip Flexion (L2) Strength B: 5/5   Hip Abduction Strength B: 5/5   Hip Adduction Strength B: 5/5   Knee Flexion Strength B: 5/5   Knee Extension (L3) Strength B: 5/5   Ankle Dorsiflexion (L4) Strength B: 5/5   Great Toe Extension (L5) Strength B: 5/5   Ankle Plantar Flexion (S1) Strength B: 5/5   Hamstring Flexibility Limited B   SLR Negative B   Slump Test Negative   Lumbar/SI Special Tests Comments Minimal low back discomfort with R LE long axis distraction. L fibular head mobilization: slight point tenderness.   Segmental Mobility WNL   Sensation Testing Dermatomes: intact   Patellar Tendon Reflexes  B: 0   Achilles Tendon Reflexes B: 0   Neurological Testing Comments Ankle clonus: 1 beat on R. Barth's: negative.   Palpation No tenderness on legs or in back/hips. Normal lateral lower leg temperature.   Planned Therapy Interventions   Planned Therapy Interventions manual therapy;joint mobilization;neuromuscular re-education;ROM;strengthening;stretching   Planned Modality Interventions   Planned Modality  Interventions Cryotherapy;Electrical stimulation;Hot packs   Clinical Impression   Criteria for Skilled Therapeutic Interventions Met yes, treatment indicated   PT Diagnosis Low back pain and B LE decreased sensation   Influenced by the following impairments Pain, impaired sensation, decreased flexibility, and decreased strength   Functional limitations due to impairments Sit and stand for extended periods, work tasks   Clinical Presentation Stable/Uncomplicated   Clinical Decision Making (Complexity) Low complexity   Therapy Frequency 1 time/week   Predicted Duration of Therapy Intervention (days/wks) 4 to 6 weeks (about 4 visits total)   Risk & Benefits of therapy have been explained Yes   Patient, Family & other staff in agreement with plan of care Yes   Clinical Impression Comments Roxie is a 61-year-old female who presents to physical therapy with signs and symptoms consistent with low back pain as well as impaired sensation in B lower extremities. These impairments include pain, decreased sensation, decreased flexibility, and decreased strength, all of which limit his ability to sit and stand or participate in desired work activities without pain or limitation. She will benefit from skilled therapy to address the listed impairments and limitations.   Ortho Goal 1   Goal Identifier LEFS   Goal Description Roxie will demonstrate improved function as evidenced by an improved LEFS score to at least 78/80.   Goal Progress 70/80   Target Date 07/13/22   Ortho Goal 2   Goal Identifier Sitting   Goal Description Roxie will be able to consistently sit for up to 1 hour at a time without increase in symptoms.   Goal Progress Unable   Target Date 07/13/22   Ortho Goal 3   Goal Identifier Standing   Goal Description Roxie will be able to consistently stand for up to 1 hour at a time without increase in symptoms.   Goal Progress 30 minutes   Target Date 07/13/22   Total Evaluation Time   PT Andrei, Low Complexity Minutes  [phone]

## 2022-06-08 ENCOUNTER — HOSPITAL ENCOUNTER (OUTPATIENT)
Dept: PHYSICAL THERAPY | Facility: REHABILITATION | Age: 62
Discharge: HOME | End: 2022-06-08
Payer: COMMERCIAL

## 2022-06-08 DIAGNOSIS — R20.9 SENSATION OF COLD IN LOWER EXTREMITY: Primary | ICD-10-CM

## 2022-06-08 DIAGNOSIS — G89.29 CHRONIC MIDLINE LOW BACK PAIN WITHOUT SCIATICA: ICD-10-CM

## 2022-06-08 DIAGNOSIS — K21.00 GASTROESOPHAGEAL REFLUX DISEASE WITH ESOPHAGITIS WITHOUT HEMORRHAGE: ICD-10-CM

## 2022-06-08 DIAGNOSIS — M54.50 CHRONIC MIDLINE LOW BACK PAIN WITHOUT SCIATICA: ICD-10-CM

## 2022-06-08 PROCEDURE — 97110 THERAPEUTIC EXERCISES: CPT | Mod: GP

## 2022-06-08 RX ORDER — SUCRALFATE 1 G/1
TABLET ORAL
Qty: 60 TABLET | Refills: 11 | Status: SHIPPED | OUTPATIENT
Start: 2022-06-08 | End: 2022-12-29

## 2022-06-09 NOTE — TELEPHONE ENCOUNTER
"Last Written Prescription Date:  5/12/22  Last Fill Quantity: 60,  # refills: 0   Last office visit provider:  5/12/22     Requested Prescriptions   Pending Prescriptions Disp Refills     sucralfate (CARAFATE) 1 GM tablet [Pharmacy Med Name: SUCRALFATE 1GM TABLETS] 60 tablet 0     Sig: TAKE 1 TABLET(1 GRAM) BY MOUTH TWICE DAILY       Miscellaneous Gastrointestinal Agents Passed - 6/8/2022  3:16 AM        Passed - Recent (12 mo) or future (30 days) visit within the authorizing provider's specialty     Patient has had an office visit with the authorizing provider or a provider within the authorizing providers department within the previous 12 mos or has a future within next 30 days. See \"Patient Info\" tab in inbasket, or \"Choose Columns\" in Meds & Orders section of the refill encounter.              Passed - Medication is active on med list        Passed - Patient is 18 years of age or older             Vandana Nunez RN 06/08/22 7:18 PM  "

## 2022-06-15 ENCOUNTER — HOSPITAL ENCOUNTER (OUTPATIENT)
Dept: PHYSICAL THERAPY | Facility: REHABILITATION | Age: 62
Discharge: HOME | End: 2022-06-15
Payer: COMMERCIAL

## 2022-06-15 DIAGNOSIS — G89.29 CHRONIC MIDLINE LOW BACK PAIN WITHOUT SCIATICA: ICD-10-CM

## 2022-06-15 DIAGNOSIS — M54.50 CHRONIC MIDLINE LOW BACK PAIN WITHOUT SCIATICA: ICD-10-CM

## 2022-06-15 DIAGNOSIS — R20.9 SENSATION OF COLD IN LOWER EXTREMITY: Primary | ICD-10-CM

## 2022-06-15 PROCEDURE — 97110 THERAPEUTIC EXERCISES: CPT | Mod: GP

## 2022-06-22 ENCOUNTER — HOSPITAL ENCOUNTER (OUTPATIENT)
Dept: PHYSICAL THERAPY | Facility: REHABILITATION | Age: 62
Discharge: HOME | End: 2022-06-22
Payer: COMMERCIAL

## 2022-06-22 DIAGNOSIS — R20.9 SENSATION OF COLD IN LOWER EXTREMITY: Primary | ICD-10-CM

## 2022-06-22 DIAGNOSIS — M54.50 CHRONIC MIDLINE LOW BACK PAIN WITHOUT SCIATICA: ICD-10-CM

## 2022-06-22 DIAGNOSIS — G89.29 CHRONIC MIDLINE LOW BACK PAIN WITHOUT SCIATICA: ICD-10-CM

## 2022-06-22 PROCEDURE — 97110 THERAPEUTIC EXERCISES: CPT | Mod: GP

## 2022-06-29 ENCOUNTER — HOSPITAL ENCOUNTER (OUTPATIENT)
Dept: PHYSICAL THERAPY | Facility: REHABILITATION | Age: 62
Discharge: HOME | End: 2022-06-29
Payer: COMMERCIAL

## 2022-06-29 DIAGNOSIS — R20.9 SENSATION OF COLD IN LOWER EXTREMITY: Primary | ICD-10-CM

## 2022-06-29 DIAGNOSIS — M54.50 CHRONIC MIDLINE LOW BACK PAIN WITHOUT SCIATICA: ICD-10-CM

## 2022-06-29 DIAGNOSIS — G89.29 CHRONIC MIDLINE LOW BACK PAIN WITHOUT SCIATICA: ICD-10-CM

## 2022-06-29 PROCEDURE — 97110 THERAPEUTIC EXERCISES: CPT | Mod: GP

## 2022-06-29 NOTE — PROGRESS NOTES
[facility] Rehabilitation Service    Outpatient Physical Therapy Discharge Note  Patient: Roxie Vargas  : 1960    Beginning/End Dates of Reporting Period:  22 to 22    Referring Provider: Dr. Yaniv Bueno    Therapy Diagnosis: Low back pain and B LE decreased sensation     Client Self Report: Roxie said that her back continues to feel better, but the sensation of cold in her feet has not changed at all since the beginning of therapy.    Objective Measurements:  Objective Measure: LEFS  Details: 73/80  Objective Measure: Worst Pain  Details: 2/10 (Back only after prolonged standing)  Objective Measure: Hamstring Flexibility  Details: L > R limited  Objective Measure: Core Strength  Details: Unable to perform 1 supine-to-sit with proper form and core stability       Outcome Measures (most recent score):  LEFS: 73/80    Goals:  Goal Identifier LEFS   Goal Description Roxie will demonstrate improved function as evidenced by an improved LEFS score to at least 78/80.   Target Date 22   Date Met      Progress (detail required for progress note): 73/80     Goal Identifier Sitting   Goal Description Roxie will be able to consistently sit for up to 1 hour at a time without increase in symptoms.   Target Date 22   Date Met      Progress (detail required for progress note): 30 minutes     Goal Identifier Standing   Goal Description Roxie will be able to consistently stand for up to 1 hour at a time without increase in symptoms.   Target Date 22   Date Met  06/15/22   Progress (detail required for progress note): 1 hour         Plan:  Discharge from therapy.    Discharge:    Reason for Discharge: Roxie has demonstrated improved back symptoms with decreased pain and increased tolerance to functional activities, however, she has not seen any change in her B LE cold sensation, which is what she was initially  referred for.    Equipment Issued: NA    Discharge Plan: Roxie will continue home program and follow-up with referring or primary care provider regarding continued leg symptoms.

## 2022-08-02 ENCOUNTER — TELEPHONE (OUTPATIENT)
Dept: GASTROENTEROLOGY | Facility: CLINIC | Age: 62
End: 2022-08-02

## 2022-08-02 DIAGNOSIS — K75.4 AUTOIMMUNE HEPATITIS (H): Primary | ICD-10-CM

## 2022-08-02 NOTE — TELEPHONE ENCOUNTER
Lab orders placed.    Serene BORREGO LPN  Hepatology Clinic     [facility] Call Center    Phone Message    May a detailed message be left on voicemail: yes     Reason for Call: Order(s): Other: lab orders  Reason for requested: lab orders have   Date needed: 22 (to make appt)  Provider name: Ridge Sanchez      Action Taken: Message routed to:  [facility] ([facility]atology [facility]    Travel Screening: Not Applicable

## 2022-08-05 ENCOUNTER — OFFICE VISIT (OUTPATIENT)
Dept: ALLERGY | Facility: CLINIC | Age: 62
End: 2022-08-05
Payer: COMMERCIAL

## 2022-08-05 VITALS
WEIGHT: 223 LBS | HEART RATE: 85 BPM | SYSTOLIC BLOOD PRESSURE: 124 MMHG | OXYGEN SATURATION: 99 % | BODY MASS INDEX: 38.28 KG/M2 | DIASTOLIC BLOOD PRESSURE: 76 MMHG

## 2022-08-05 DIAGNOSIS — Z01.82 ENCOUNTER FOR ALLERGY TESTING: ICD-10-CM

## 2022-08-05 DIAGNOSIS — L29.9 PRURITIC CONDITION: Primary | ICD-10-CM

## 2022-08-05 PROCEDURE — 99203 OFFICE O/P NEW LOW 30 MIN: CPT | Mod: 25 | Performed by: ALLERGY & IMMUNOLOGY

## 2022-08-05 PROCEDURE — 95004 PERQ TESTS W/ALRGNC XTRCS: CPT | Performed by: ALLERGY & IMMUNOLOGY

## 2022-08-05 NOTE — PROGRESS NOTES
Subjective   Roxie is a 62 year old, presenting for the following health issues:  Allergy Consult      HPI     Chief complaint: Itching, allergies    History of present illness: This is a pleasant 62-year-old woman that I was asked to see for evaluation by Dr. Chopra in regards to itching and allergies.  Patient states that for many years now she had itching.  It is now most concentrated on her scalp, face and right leg.  She states she will itch and then dark spots appear.  She has no rash prior to the itching.  She states she saw dermatology many years ago for this.  She states she was given some creams.  I do not have this record to review.  She states she saw dermatology a few years ago for the face itching but again I do not have this record to review.  She does not use any current allergy medication for this.  She does note that the itching worsens when it is dry.  She does use sensitive skin care products thinking that this was causing some of the itching.  She does note some seasonal allergies.  She removed her carpeting at home and replaced it with hardwood.  She states since doing that that seems to have improved symptoms.  She has known history of shrimp allergy and does not currently carry an epinephrine device.  She is not wanting this to be investigated but wonders if she could be allergic to other foods.  No hives, swelling or shortness of breath after eating other foods.  She states she had immediate swelling but eating shrimp.    Past medical history: She has a history of autoimmune hepatitis but states this is now well controlled, history of ulcerative colitis, reflux    Social history: No pets at home, non-smoker    Family history: Negative for allergies    Review of Systems   Constitutional, HEENT, cardiovascular, pulmonary, gi and gu systems are negative, except as otherwise noted.      Objective    /76   Pulse 85   Wt 101.2 kg (223 lb)   SpO2 99%   BMI 38.28 kg/m    Body mass  index is 38.28 kg/m .  Physical Exam     Gen: Pleasant female not in acute distress  HEENT: Eyes no erythema of the bulbar or palpebral conjunctiva, no edema. Ears: No deformities or lesions. Nose: No congestion, mucosa normal mouth: Throat clear, no lip or tongue edema.   Neck: No masses lesions or swelling  Respiratory: No coughing with breathing, no retractions  Lymph: No visible supraclavicular or cervical lymphadenopathy  Skin: Pigmented spots in the right lower leg and on the forehead, nonblanching  Psych: Alert and appropriate for age    30 percutaneous test were placed today environmental skin test panel positive histamine control with a small positive to cockroach.  Please see scanned photograph.      Impression report and plan:  1.  Itching    Allergy testing was largely negative.  I did review wet wrap therapy consider skin care.  Recommended trying loratadine 10 mg twice a day.  Avoid cetirizine given her history of hepatitis.  If this does not improve symptoms, recommend referral back to dermatology for further treatment options.  Itching without rash is typically not due to allergy.  Of note, patient declined testing to shrimp.  I did recommend carrying an epinephrine device and the patient would like to think about this option.  .  ..

## 2022-08-05 NOTE — LETTER
8/5/2022         RE: Roxie Vargas  [address] [facility] [address]        Dear Colleague,    Thank you for referring your patient, Roxie Vargas, to the [facility]. Please see a copy of my visit note below.          Subjective   Roxie is a 62 year old, presenting for the following health issues:  Allergy Consult      HPI     Chief complaint: Itching, allergies    History of present illness: This is a pleasant 62-year-old woman that I was asked to see for evaluation by Dr. Chopra in regards to itching and allergies.  Patient states that for many years now she had itching.  It is now most concentrated on her scalp, face and right leg.  She states she will itch and then dark spots appear.  She has no rash prior to the itching.  She states she saw dermatology many years ago for this.  She states she was given some creams.  I do not have this record to review.  She states she saw dermatology a few years ago for the face itching but again I do not have this record to review.  She does not use any current allergy medication for this.  She does note that the itching worsens when it is dry.  She does use sensitive skin care products thinking that this was causing some of the itching.  She does note some seasonal allergies.  She removed her carpeting at home and replaced it with hardwood.  She states since doing that that seems to have improved symptoms.  She has known history of shrimp allergy and does not currently carry an epinephrine device.  She is not wanting this to be investigated but wonders if she could be allergic to other foods.  No hives, swelling or shortness of breath after eating other foods.  She states she had immediate swelling but eating shrimp.    Past medical history: She has a history of autoimmune hepatitis but states this is now well controlled, history of ulcerative colitis, reflux    Social history: No pets at home, non-smoker    Family  history: Negative for allergies    Review of Systems   Constitutional, HEENT, cardiovascular, pulmonary, gi and gu systems are negative, except as otherwise noted.      Objective    /76   Pulse 85   Wt 101.2 kg (223 lb)   SpO2 99%   BMI 38.28 kg/m    Body mass index is 38.28 kg/m .  Physical Exam     Gen: Pleasant female not in acute distress  HEENT: Eyes no erythema of the bulbar or palpebral conjunctiva, no edema. Ears: No deformities or lesions. Nose: No congestion, mucosa normal mouth: Throat clear, no lip or tongue edema.   Neck: No masses lesions or swelling  Respiratory: No coughing with breathing, no retractions  Lymph: No visible supraclavicular or cervical lymphadenopathy  Skin: Pigmented spots in the right lower leg and on the forehead, nonblanching  Psych: Alert and appropriate for age    30 percutaneous test were placed today environmental skin test panel positive histamine control with a small positive to cockroach.  Please see scanned photograph.      Impression report and plan:  1.  Itching    Allergy testing was largely negative.  I did review wet wrap therapy consider skin care.  Recommended trying loratadine 10 mg twice a day.  Avoid cetirizine given her history of hepatitis.  If this does not improve symptoms, recommend referral back to dermatology for further treatment options.  Itching without rash is typically not due to allergy.  Of note, patient declined testing to shrimp.  I did recommend carrying an epinephrine device and the patient would like to think about this option.  .  ..      Again, thank you for allowing me to participate in the care of your patient.        Sincerely,        Velia RIVERA MD

## 2022-09-01 ENCOUNTER — LAB (OUTPATIENT)
Dept: LAB | Facility: CLINIC | Age: 62
End: 2022-09-01
Payer: COMMERCIAL

## 2022-09-01 DIAGNOSIS — K75.4 AUTOIMMUNE HEPATITIS (H): ICD-10-CM

## 2022-09-01 LAB
ALBUMIN SERPL-MCNC: 3.7 G/DL (ref 3.5–5)
ALP SERPL-CCNC: 65 U/L (ref 45–120)
ALT SERPL W P-5'-P-CCNC: <9 U/L (ref 0–45)
ANION GAP SERPL CALCULATED.3IONS-SCNC: 6 MMOL/L (ref 5–18)
AST SERPL W P-5'-P-CCNC: 10 U/L (ref 0–40)
BILIRUB DIRECT SERPL-MCNC: 0.2 MG/DL
BILIRUB SERPL-MCNC: 0.6 MG/DL (ref 0–1)
BUN SERPL-MCNC: 6 MG/DL (ref 8–22)
CALCIUM SERPL-MCNC: 9.3 MG/DL (ref 8.5–10.5)
CHLORIDE BLD-SCNC: 108 MMOL/L (ref 98–107)
CO2 SERPL-SCNC: 27 MMOL/L (ref 22–31)
CREAT SERPL-MCNC: 0.68 MG/DL (ref 0.6–1.1)
ERYTHROCYTE [DISTWIDTH] IN BLOOD BY AUTOMATED COUNT: 14.9 % (ref 10–15)
GFR SERPL CREATININE-BSD FRML MDRD: >90 ML/MIN/1.73M2
GLUCOSE BLD-MCNC: 92 MG/DL (ref 70–125)
HCT VFR BLD AUTO: 34.6 % (ref 35–47)
HGB BLD-MCNC: 10.9 G/DL (ref 11.7–15.7)
INR PPP: 1 (ref 0.85–1.15)
MCH RBC QN AUTO: 27 PG (ref 26.5–33)
MCHC RBC AUTO-ENTMCNC: 31.5 G/DL (ref 31.5–36.5)
MCV RBC AUTO: 86 FL (ref 78–100)
PLATELET # BLD AUTO: 191 10E3/UL (ref 150–450)
POTASSIUM BLD-SCNC: 3.7 MMOL/L (ref 3.5–5)
PROT SERPL-MCNC: 7.3 G/DL (ref 6–8)
RBC # BLD AUTO: 4.04 10E6/UL (ref 3.8–5.2)
SODIUM SERPL-SCNC: 141 MMOL/L (ref 136–145)
WBC # BLD AUTO: 5.3 10E3/UL (ref 4–11)

## 2022-09-01 PROCEDURE — 36415 COLL VENOUS BLD VENIPUNCTURE: CPT

## 2022-09-01 PROCEDURE — 85610 PROTHROMBIN TIME: CPT

## 2022-09-01 PROCEDURE — 85027 COMPLETE CBC AUTOMATED: CPT

## 2022-09-01 PROCEDURE — 82248 BILIRUBIN DIRECT: CPT

## 2022-09-01 PROCEDURE — 82310 ASSAY OF CALCIUM: CPT

## 2022-09-07 ENCOUNTER — TELEPHONE (OUTPATIENT)
Dept: GASTROENTEROLOGY | Facility: CLINIC | Age: 62
End: 2022-09-07

## 2022-09-09 ENCOUNTER — OFFICE VISIT (OUTPATIENT)
Dept: GASTROENTEROLOGY | Facility: CLINIC | Age: 62
End: 2022-09-09
Attending: PHYSICIAN ASSISTANT
Payer: COMMERCIAL

## 2022-09-09 VITALS
BODY MASS INDEX: 37.76 KG/M2 | OXYGEN SATURATION: 99 % | HEART RATE: 72 BPM | SYSTOLIC BLOOD PRESSURE: 135 MMHG | WEIGHT: 220 LBS | DIASTOLIC BLOOD PRESSURE: 75 MMHG

## 2022-09-09 DIAGNOSIS — K75.4 AUTOIMMUNE HEPATITIS (H): Primary | ICD-10-CM

## 2022-09-09 PROCEDURE — 99214 OFFICE O/P EST MOD 30 MIN: CPT | Performed by: PHYSICIAN ASSISTANT

## 2022-09-09 NOTE — PROGRESS NOTES
Hepatology Follow-up Clinic note  Roxie Vargas   Date of Birth 1960  Date of Service 2022    Reason for follow-up: History of AIH         Assessment/plan:   Roxie Vargas is a 62 year old female with history of autoimmune hepatitis, who was weaned off of azathioprine in 2021.  Prior to that she had been on 25 mg of azathioprine for 6 months.  Her transaminases remain normal and her liver function is also normal.     - Continue monitoring hepatic panel and IgG every 4 months for the next year   - Follow-up in clinic in based on the above / one year     Ridge Sanchez PA-C   [facility] Hepatology clinic    Total time for E/M services performed on the date of the encounter 30 minutes.  This included review of previous: clinic visits, hospital records, lab results, imaging studies, and procedural documentation. Time also includes patient visit, documentation and discussion with other providers.  The findings from this review are summarized in the above note.     -----------------------------------------------------       HPI:   Roxie Vargas is a 62 year old female presenting for follow-up.     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  No recent hospitalizations or ER visits.  Was prescribed carafate for acid reflux.    Her azathioprine was tapered off completely in 2021.    Appetite is normal. Following plant based diet and this is helped her reflux symptoms.  Weight has been stable for the past year3 miles 3-4 times a week.     Patient denies jaundice, lower extremity edema, abdominal distension or confusion.  Patient also denies melena, hematochezia or hematemesis. Patient denies weight  loss, fevers, sweats or chills.    She does not drink alcohol.    Medical hx Surgical hx   Past Medical History:   Diagnosis Date     Autoimmune hepatitis (H)      Colitis, ulcerative (H)      Fibroids      Heartburn      Iron deficiency anemia      Ulcer     Past Surgical History:   Procedure Laterality Date     COLONOSCOPY  4/1/2013    Procedure: COLONOSCOPY;;  Surgeon: Tobias Merchant MD;  Location:  GI     dilation & curretage         uterine fibroids removed[  2005     wisdom teeth[                   Medications:     Current Outpatient Medications   Medication     ascorbic acid (VITAMIN C) 1000 MG TABS     calcium citrate and vitamin D (CITRACAL) 200-250 MG-UNIT TABS per tablet     carboxymethylcellulose (CARBOXYMETHYLCELLULOSE SODIUM) 0.5 % SOLN ophthalmic solution     Ferrous Sulfate (IRON CR PO)     ibuprofen (ADVIL,MOTRIN) 600 MG tablet     ketoconazole (NIZORAL) 2 % external cream     multivitamin (CENTRUM SILVER) tablet     olopatadine (PATADAY) 0.2 % ophthalmic solution     omeprazole (PRILOSEC) 20 MG DR capsule     sucralfate (CARAFATE) 1 GM tablet     No current facility-administered medications for this visit.            Allergies:     Allergies   Allergen Reactions     Shrimp Anaphylaxis     Aspirin Other (See Comments)     Watery eyes. Happened a long time ago.             Review of Systems:   10 points ROS was obtained and highlighted in the HPI, otherwise negative.          Physical Exam:   VS:  /75   Pulse 72   Wt 99.8 kg (220 lb)   SpO2 99%   Breastfeeding No   BMI 37.76 kg/m        Gen- well, NAD, A+Ox3, normal color  Lym- no palpable LAD  CVS- RRR  RS- CTA  Abd-soft nontender no organomegaly.  Extr- hands normal, no LINDA  Skin- no rash or jaundice  Neuro- no asterixis  Psych- normal mood         Data:   Reviewed in person and significant for:    Lab Results   Component Value Date     09/01/2022     06/26/2020      Lab Results   Component Value Date    POTASSIUM 3.7  09/01/2022    POTASSIUM 3.5 06/26/2020     Lab Results   Component Value Date    CHLORIDE 108 09/01/2022    CHLORIDE 107 06/26/2020     Lab Results   Component Value Date    CO2 27 09/01/2022    CO2 26 06/26/2020     Lab Results   Component Value Date    BUN 6 09/01/2022    BUN 9 06/26/2020     Lab Results   Component Value Date    CR 0.68 09/01/2022    CR 0.67 06/26/2020       Lab Results   Component Value Date    WBC 5.3 09/01/2022    WBC 6.5 06/26/2020     Lab Results   Component Value Date    HGB 10.9 09/01/2022    HGB 11.4 06/26/2020     Lab Results   Component Value Date    HCT 34.6 09/01/2022    HCT 37.1 06/26/2020     Lab Results   Component Value Date    MCV 86 09/01/2022    MCV 87 06/26/2020     Lab Results   Component Value Date     09/01/2022     06/26/2020       Lab Results   Component Value Date    AST 10 09/01/2022    AST 9 06/26/2020     Lab Results   Component Value Date    ALT <9 09/01/2022    ALT 16 06/26/2020     No results found for: BILICONJ   Lab Results   Component Value Date    BILITOTAL 0.6 09/01/2022    BILITOTAL 0.4 06/26/2020       Lab Results   Component Value Date    ALBUMIN 3.7 09/01/2022    ALBUMIN 3.6 06/26/2020     Lab Results   Component Value Date    PROTTOTAL 7.3 09/01/2022    PROTTOTAL 8.1 06/26/2020      Lab Results   Component Value Date    ALKPHOS 65 09/01/2022    ALKPHOS 81 06/26/2020       Lab Results   Component Value Date    INR 1.00 09/01/2022    INR 1.05 06/26/2020

## 2022-09-09 NOTE — LETTER
2022         RE: Roxie Vargas  [address] [facility] [address]        Dear Colleague,    Thank you for referring your patient, Roxie Vargas, to the [facility]PATOLOG[facility] [place]. Please see a copy of my visit note below.    Hepatology Follow-up Clinic note  Roxie Vargas   Date of Birth 1960  Date of Service 2022    Reason for follow-up: History of AIH         Assessment/plan:   oRxie Vargas is a 62 year old female with history of autoimmune hepatitis, who was weaned off of azathioprine in 2021.  Prior to that she had been on 25 mg of azathioprine for 6 months.  Her transaminases remain normal and her liver function is also normal.     - Continue monitoring hepatic panel and IgG every 4 months for the next year   - Follow-up in clinic in based on the above / one year     Ridge Sanchez PA-C   [facility] Hepatology clinic    Total time for E/M services performed on the date of the encounter 30 minutes.  This included review of previous: clinic visits, hospital records, lab results, imaging studies, and procedural documentation. Time also includes patient visit, documentation and discussion with other providers.  The findings from this review are summarized in the above note.     -----------------------------------------------------       HPI:   Roxie Vargas is a 62 year old female presenting for follow-up.     Autoimmune Hepatitis   -Diagnosed: 2015  -Prior biopsy: 2106, bridging fibrosis   Fibrosis Scan: 2018, F0-F1 , 5.8 kilopascals   -Prior treatments:   - Prednisone for induction at diagnosis   - Azothioprine since 2016  Previous IG on 2016     Patient was last seen by me on 2021 via video.  No recent hospitalizations or ER visits.  Was prescribed carafate for acid reflux.    Her azathioprine was tapered off completely in 2021.    Appetite is normal. Following plant based diet  and this is helped her reflux symptoms.  Weight has been stable for the past year3 miles 3-4 times a week.     Patient denies jaundice, lower extremity edema, abdominal distension or confusion.  Patient also denies melena, hematochezia or hematemesis. Patient denies weight loss, fevers, sweats or chills.    She does not drink alcohol.    Medical hx Surgical hx   Past Medical History:   Diagnosis Date     Autoimmune hepatitis (H)      Colitis, ulcerative (H)      Fibroids      Heartburn      Iron deficiency anemia      Ulcer     Past Surgical History:   Procedure Laterality Date     COLONOSCOPY  4/1/2013    Procedure: COLONOSCOPY;;  Surgeon: Tobias Merchant MD;  Location: [facility]     dilation & curretage         uterine fibroids removed[  2005     wisdom teeth[                   Medications:     Current Outpatient Medications   Medication     ascorbic acid (VITAMIN C) 1000 MG TABS     calcium citrate and vitamin D (CITRACAL) 200-250 MG-UNIT TABS per tablet     carboxymethylcellulose (CARBOXYMETHYLCELLULOSE SODIUM) 0.5 % SOLN ophthalmic solution     Ferrous Sulfate (IRON CR PO)     ibuprofen (ADVIL,MOTRIN) 600 MG tablet     ketoconazole (NIZORAL) 2 % external cream     multivitamin (CENTRUM SILVER) tablet     olopatadine (PATADAY) 0.2 % ophthalmic solution     omeprazole (PRILOSEC) 20 MG DR capsule     sucralfate (CARAFATE) 1 GM tablet     No current facility-administered medications for this visit.            Allergies:     Allergies   Allergen Reactions     Shrimp Anaphylaxis     Aspirin Other (See Comments)     Watery eyes. Happened a long time ago.             Review of Systems:   10 points ROS was obtained and highlighted in the HPI, otherwise negative.          Physical Exam:   VS:  /75   Pulse 72   Wt 99.8 kg (220 lb)   SpO2 99%   Breastfeeding No   BMI 37.76 kg/m        Gen- well, NAD, A+Ox3, normal color  Lym- no palpable LAD  CVS- RRR  RS- CTA  Abd-soft nontender no organomegaly.  Extr- hands normal,  no LINDA  Skin- no rash or jaundice  Neuro- no asterixis  Psych- normal mood         Data:   Reviewed in person and significant for:    Lab Results   Component Value Date     09/01/2022     06/26/2020      Lab Results   Component Value Date    POTASSIUM 3.7 09/01/2022    POTASSIUM 3.5 06/26/2020     Lab Results   Component Value Date    CHLORIDE 108 09/01/2022    CHLORIDE 107 06/26/2020     Lab Results   Component Value Date    CO2 27 09/01/2022    CO2 26 06/26/2020     Lab Results   Component Value Date    BUN 6 09/01/2022    BUN 9 06/26/2020     Lab Results   Component Value Date    CR 0.68 09/01/2022    CR 0.67 06/26/2020       Lab Results   Component Value Date    WBC 5.3 09/01/2022    WBC 6.5 06/26/2020     Lab Results   Component Value Date    HGB 10.9 09/01/2022    HGB 11.4 06/26/2020     Lab Results   Component Value Date    HCT 34.6 09/01/2022    HCT 37.1 06/26/2020     Lab Results   Component Value Date    MCV 86 09/01/2022    MCV 87 06/26/2020     Lab Results   Component Value Date     09/01/2022     06/26/2020       Lab Results   Component Value Date    AST 10 09/01/2022    AST 9 06/26/2020     Lab Results   Component Value Date    ALT <9 09/01/2022    ALT 16 06/26/2020     No results found for: BILICONJ   Lab Results   Component Value Date    BILITOTAL 0.6 09/01/2022    BILITOTAL 0.4 06/26/2020       Lab Results   Component Value Date    ALBUMIN 3.7 09/01/2022    ALBUMIN 3.6 06/26/2020     Lab Results   Component Value Date    PROTTOTAL 7.3 09/01/2022    PROTTOTAL 8.1 06/26/2020      Lab Results   Component Value Date    ALKPHOS 65 09/01/2022    ALKPHOS 81 06/26/2020       Lab Results   Component Value Date    INR 1.00 09/01/2022    INR 1.05 06/26/2020         Again, thank you for allowing me to participate in the care of your patient.        Sincerely,        Ridge Sanchez PA-C

## 2022-11-21 ENCOUNTER — HEALTH MAINTENANCE LETTER (OUTPATIENT)
Age: 62
End: 2022-11-21

## 2022-12-24 ENCOUNTER — HEALTH MAINTENANCE LETTER (OUTPATIENT)
Age: 62
End: 2022-12-24

## 2022-12-29 ENCOUNTER — OFFICE VISIT (OUTPATIENT)
Dept: FAMILY MEDICINE | Facility: CLINIC | Age: 62
End: 2022-12-29
Payer: COMMERCIAL

## 2022-12-29 VITALS
SYSTOLIC BLOOD PRESSURE: 122 MMHG | BODY MASS INDEX: 38.58 KG/M2 | HEIGHT: 64 IN | DIASTOLIC BLOOD PRESSURE: 80 MMHG | WEIGHT: 226 LBS | HEART RATE: 59 BPM | OXYGEN SATURATION: 100 % | TEMPERATURE: 97.1 F

## 2022-12-29 DIAGNOSIS — E66.01 MORBID OBESITY (H): ICD-10-CM

## 2022-12-29 DIAGNOSIS — G89.29 BILATERAL CHRONIC KNEE PAIN: ICD-10-CM

## 2022-12-29 DIAGNOSIS — R20.9 SENSATION OF COLD IN LOWER EXTREMITY: ICD-10-CM

## 2022-12-29 DIAGNOSIS — Z00.00 ROUTINE GENERAL MEDICAL EXAMINATION AT A HEALTH CARE FACILITY: Primary | ICD-10-CM

## 2022-12-29 DIAGNOSIS — M25.561 BILATERAL CHRONIC KNEE PAIN: ICD-10-CM

## 2022-12-29 DIAGNOSIS — R19.00 PELVIC FULLNESS IN FEMALE: ICD-10-CM

## 2022-12-29 DIAGNOSIS — R73.03 PREDIABETES: ICD-10-CM

## 2022-12-29 DIAGNOSIS — Z23 NEED FOR COVID-19 VACCINE: ICD-10-CM

## 2022-12-29 DIAGNOSIS — Z86.018 HISTORY OF UTERINE FIBROID: ICD-10-CM

## 2022-12-29 DIAGNOSIS — K75.4 AUTOIMMUNE HEPATITIS (H): ICD-10-CM

## 2022-12-29 DIAGNOSIS — M25.562 BILATERAL CHRONIC KNEE PAIN: ICD-10-CM

## 2022-12-29 DIAGNOSIS — E66.812 CLASS 2 OBESITY WITHOUT SERIOUS COMORBIDITY WITH BODY MASS INDEX (BMI) OF 38.0 TO 38.9 IN ADULT, UNSPECIFIED OBESITY TYPE: ICD-10-CM

## 2022-12-29 DIAGNOSIS — F43.10 PTSD (POST-TRAUMATIC STRESS DISORDER): ICD-10-CM

## 2022-12-29 DIAGNOSIS — D50.9 IRON DEFICIENCY ANEMIA, UNSPECIFIED IRON DEFICIENCY ANEMIA TYPE: ICD-10-CM

## 2022-12-29 PROBLEM — K21.00 GASTROESOPHAGEAL REFLUX DISEASE WITH ESOPHAGITIS WITHOUT HEMORRHAGE: Status: RESOLVED | Noted: 2021-02-03 | Resolved: 2022-12-29

## 2022-12-29 PROBLEM — M25.542 PAIN IN THUMB JOINT WITH MOVEMENT OF LEFT HAND: Status: RESOLVED | Noted: 2017-01-02 | Resolved: 2022-12-29

## 2022-12-29 PROBLEM — M65.4 RADIAL STYLOID TENOSYNOVITIS: Status: RESOLVED | Noted: 2017-01-02 | Resolved: 2022-12-29

## 2022-12-29 LAB
ALBUMIN SERPL BCG-MCNC: 4.2 G/DL (ref 3.5–5.2)
ALP SERPL-CCNC: 79 U/L (ref 35–104)
ALT SERPL W P-5'-P-CCNC: 7 U/L (ref 10–35)
AST SERPL W P-5'-P-CCNC: 21 U/L (ref 10–35)
BILIRUB DIRECT SERPL-MCNC: <0.2 MG/DL (ref 0–0.3)
BILIRUB SERPL-MCNC: 0.4 MG/DL
CHOLEST SERPL-MCNC: 208 MG/DL
ERYTHROCYTE [DISTWIDTH] IN BLOOD BY AUTOMATED COUNT: 14.5 % (ref 10–15)
FERRITIN SERPL-MCNC: 109 NG/ML (ref 11–328)
HBA1C MFR BLD: 5.5 % (ref 0–5.6)
HCT VFR BLD AUTO: 33.3 % (ref 35–47)
HDLC SERPL-MCNC: 64 MG/DL
HGB BLD-MCNC: 10.8 G/DL (ref 11.7–15.7)
IRON BINDING CAPACITY (ROCHE): 284 UG/DL (ref 240–430)
IRON SATN MFR SERPL: 13 % (ref 15–46)
IRON SERPL-MCNC: 36 UG/DL (ref 37–145)
LDLC SERPL CALC-MCNC: 132 MG/DL
MCH RBC QN AUTO: 28.1 PG (ref 26.5–33)
MCHC RBC AUTO-ENTMCNC: 32.4 G/DL (ref 31.5–36.5)
MCV RBC AUTO: 87 FL (ref 78–100)
NONHDLC SERPL-MCNC: 144 MG/DL
PLATELET # BLD AUTO: 203 10E3/UL (ref 150–450)
PROT SERPL-MCNC: 7.6 G/DL (ref 6.4–8.3)
RBC # BLD AUTO: 3.84 10E6/UL (ref 3.8–5.2)
TRIGL SERPL-MCNC: 62 MG/DL
WBC # BLD AUTO: 6.4 10E3/UL (ref 4–11)

## 2022-12-29 PROCEDURE — 83036 HEMOGLOBIN GLYCOSYLATED A1C: CPT | Performed by: NURSE PRACTITIONER

## 2022-12-29 PROCEDURE — 90471 IMMUNIZATION ADMIN: CPT | Performed by: NURSE PRACTITIONER

## 2022-12-29 PROCEDURE — 85027 COMPLETE CBC AUTOMATED: CPT | Performed by: NURSE PRACTITIONER

## 2022-12-29 PROCEDURE — 90677 PCV20 VACCINE IM: CPT | Performed by: NURSE PRACTITIONER

## 2022-12-29 PROCEDURE — 83550 IRON BINDING TEST: CPT | Performed by: NURSE PRACTITIONER

## 2022-12-29 PROCEDURE — 82728 ASSAY OF FERRITIN: CPT | Performed by: NURSE PRACTITIONER

## 2022-12-29 PROCEDURE — 80061 LIPID PANEL: CPT | Performed by: NURSE PRACTITIONER

## 2022-12-29 PROCEDURE — 99214 OFFICE O/P EST MOD 30 MIN: CPT | Mod: 25 | Performed by: NURSE PRACTITIONER

## 2022-12-29 PROCEDURE — 99396 PREV VISIT EST AGE 40-64: CPT | Mod: 25 | Performed by: NURSE PRACTITIONER

## 2022-12-29 PROCEDURE — 36415 COLL VENOUS BLD VENIPUNCTURE: CPT | Performed by: NURSE PRACTITIONER

## 2022-12-29 PROCEDURE — 83540 ASSAY OF IRON: CPT | Performed by: NURSE PRACTITIONER

## 2022-12-29 PROCEDURE — 91312 COVID-19 VACCINE BIVALENT BOOSTER 12+ (PFIZER): CPT | Performed by: NURSE PRACTITIONER

## 2022-12-29 PROCEDURE — 82784 ASSAY IGA/IGD/IGG/IGM EACH: CPT | Performed by: NURSE PRACTITIONER

## 2022-12-29 PROCEDURE — 80076 HEPATIC FUNCTION PANEL: CPT | Performed by: NURSE PRACTITIONER

## 2022-12-29 PROCEDURE — 0124A COVID-19 VACCINE BIVALENT BOOSTER 12+ (PFIZER): CPT | Performed by: NURSE PRACTITIONER

## 2022-12-29 ASSESSMENT — ENCOUNTER SYMPTOMS
DIARRHEA: 0
PARESTHESIAS: 0
EYE PAIN: 1
NAUSEA: 0
ARTHRALGIAS: 1
SORE THROAT: 0
CONSTIPATION: 0
FREQUENCY: 0
HEMATURIA: 0
CHILLS: 0
HEADACHES: 0
HEMATOCHEZIA: 0
WEAKNESS: 0
ABDOMINAL PAIN: 0
DIZZINESS: 1
NERVOUS/ANXIOUS: 0
SHORTNESS OF BREATH: 0
FEVER: 0
BREAST MASS: 0
JOINT SWELLING: 0
MYALGIAS: 0
PALPITATIONS: 0
COUGH: 0
HEARTBURN: 0
DYSURIA: 0
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
## 2022-12-30 ENCOUNTER — HOSPITAL ENCOUNTER (OUTPATIENT)
Dept: MAMMOGRAPHY | Facility: CLINIC | Age: 62
Discharge: HOME | End: 2022-12-30
Attending: FAMILY MEDICINE | Admitting: FAMILY MEDICINE
Payer: COMMERCIAL

## 2022-12-30 DIAGNOSIS — Z12.31 VISIT FOR SCREENING MAMMOGRAM: ICD-10-CM

## 2022-12-30 LAB — IGG SERPL-MCNC: 1697 MG/DL (ref 610–1616)

## 2022-12-30 PROCEDURE — 77067 SCR MAMMO BI INCL CAD: CPT

## 2023-01-06 ENCOUNTER — HOSPITAL ENCOUNTER (OUTPATIENT)
Dept: ULTRASOUND IMAGING | Facility: CLINIC | Age: 63
Discharge: HOME | End: 2023-01-06
Attending: NURSE PRACTITIONER | Admitting: NURSE PRACTITIONER

## 2023-01-06 DIAGNOSIS — R19.00 PELVIC FULLNESS IN FEMALE: ICD-10-CM

## 2023-01-06 DIAGNOSIS — Z86.018 HISTORY OF UTERINE FIBROID: ICD-10-CM

## 2023-01-06 PROCEDURE — 76856 US EXAM PELVIC COMPLETE: CPT

## 2023-02-21 ENCOUNTER — TELEPHONE (OUTPATIENT)
Dept: BEHAVIORAL HEALTH | Facility: CLINIC | Age: 63
End: 2023-02-21
Payer: COMMERCIAL

## 2023-02-21 NOTE — TELEPHONE ENCOUNTER
Writer spoke with pt and scheduled initial TC therapy appointment on 03/02/2023 @ 12:00 pm. Writer sent intake documents via [facility]. Writer will reply to referral source.Tracker completed.    Mary Parks  02/21/2023  316    ----- Message from Mary Parks sent at 2/21/2023  3:12 PM CST -----  Transition Clinic Referral   [place]         Please Check Type of Referral Requested:       __xxxxx__THERAPY: The Transition clinic is able to schedule patients without current medical insurance; these patient will be referred to our Social Work Care Coordinator for Medical Insurance              Assistance. We are open for referral for psychotherapy. Patient is referred from:  [facility]        Referring Provider Contact Name: Mary Parks Phone Number: [phone]    Reason for Transition Clinic Referral: Patient appointment cancelled with [facility]- patient transferred over que call      What Would Be Helpful from the Transition Clinic: therapy services and assistance finding long term provider     Needs: NO    Does Patient Have Access to Technology: yes    Patient E-mail Address: [email or web address]    Current Patient Phone Number: [phone]    Clinician Gender Preference (if applicable): NO    Patient location preference: [place]

## 2023-03-02 ENCOUNTER — VIRTUAL VISIT (OUTPATIENT)
Dept: BEHAVIORAL HEALTH | Facility: CLINIC | Age: 63
End: 2023-03-02
Payer: COMMERCIAL

## 2023-03-02 NOTE — PROGRESS NOTES
Met with Roxie from 1200 - 1213. Discussed how she ended up with transition clinic from [facility]; which was due to appointment with [facility] being cancelled day of scheduled appointment. Roxie noted she did not have next level of care scheduled, but did not need short term therapy. Clinician helped Roxie get scheduled for long term therapy on 3/9/2023 with [facility].     Zina Olmedo PsyD., LP

## 2023-10-19 ENCOUNTER — OFFICE VISIT (OUTPATIENT)
Dept: FAMILY MEDICINE | Facility: CLINIC | Age: 63
End: 2023-10-19
Payer: COMMERCIAL

## 2023-10-19 VITALS
WEIGHT: 242 LBS | SYSTOLIC BLOOD PRESSURE: 128 MMHG | TEMPERATURE: 98.3 F | DIASTOLIC BLOOD PRESSURE: 78 MMHG | OXYGEN SATURATION: 97 % | HEART RATE: 76 BPM | BODY MASS INDEX: 41.54 KG/M2

## 2023-10-19 DIAGNOSIS — K75.4 AUTOIMMUNE HEPATITIS (H): ICD-10-CM

## 2023-10-19 DIAGNOSIS — E66.812 CLASS 2 OBESITY WITHOUT SERIOUS COMORBIDITY WITH BODY MASS INDEX (BMI) OF 38.0 TO 38.9 IN ADULT, UNSPECIFIED OBESITY TYPE: ICD-10-CM

## 2023-10-19 DIAGNOSIS — Z12.4 CERVICAL CANCER SCREENING: ICD-10-CM

## 2023-10-19 DIAGNOSIS — R42 DIZZINESS: Primary | ICD-10-CM

## 2023-10-19 DIAGNOSIS — R73.03 PREDIABETES: ICD-10-CM

## 2023-10-19 DIAGNOSIS — D50.8 IRON DEFICIENCY ANEMIA SECONDARY TO INADEQUATE DIETARY IRON INTAKE: ICD-10-CM

## 2023-10-19 DIAGNOSIS — N95.0 POSTMENOPAUSAL BLEEDING: ICD-10-CM

## 2023-10-19 DIAGNOSIS — Z12.11 SCREEN FOR COLON CANCER: ICD-10-CM

## 2023-10-19 DIAGNOSIS — R35.0 URINE FREQUENCY: ICD-10-CM

## 2023-10-19 DIAGNOSIS — Z13.220 LIPID SCREENING: ICD-10-CM

## 2023-10-19 PROBLEM — E66.01 MORBID OBESITY (H): Status: RESOLVED | Noted: 2022-12-29 | Resolved: 2023-10-19

## 2023-10-19 LAB
ALBUMIN SERPL BCG-MCNC: 4.3 G/DL (ref 3.5–5.2)
ALBUMIN UR-MCNC: NEGATIVE MG/DL
ALP SERPL-CCNC: 69 U/L (ref 35–104)
ALT SERPL W P-5'-P-CCNC: 9 U/L (ref 0–50)
APPEARANCE UR: CLEAR
AST SERPL W P-5'-P-CCNC: 17 U/L (ref 0–45)
BACTERIA #/AREA URNS HPF: ABNORMAL /HPF
BILIRUB DIRECT SERPL-MCNC: <0.2 MG/DL (ref 0–0.3)
BILIRUB SERPL-MCNC: 0.3 MG/DL
BILIRUB UR QL STRIP: NEGATIVE
CHOLEST SERPL-MCNC: 194 MG/DL
CLUE CELLS: ABNORMAL
COLOR UR AUTO: YELLOW
ERYTHROCYTE [DISTWIDTH] IN BLOOD BY AUTOMATED COUNT: 14.5 % (ref 10–15)
GLUCOSE UR STRIP-MCNC: NEGATIVE MG/DL
HBA1C MFR BLD: 5.5 % (ref 0–5.6)
HCT VFR BLD AUTO: 34.3 % (ref 35–47)
HDLC SERPL-MCNC: 53 MG/DL
HGB BLD-MCNC: 10.9 G/DL (ref 11.7–15.7)
HGB UR QL STRIP: ABNORMAL
KETONES UR STRIP-MCNC: NEGATIVE MG/DL
LDLC SERPL CALC-MCNC: 126 MG/DL
LEUKOCYTE ESTERASE UR QL STRIP: ABNORMAL
MCH RBC QN AUTO: 27.1 PG (ref 26.5–33)
MCHC RBC AUTO-ENTMCNC: 31.8 G/DL (ref 31.5–36.5)
MCV RBC AUTO: 85 FL (ref 78–100)
NITRATE UR QL: NEGATIVE
NONHDLC SERPL-MCNC: 141 MG/DL
PH UR STRIP: 6 [PH] (ref 5–8)
PLATELET # BLD AUTO: 200 10E3/UL (ref 150–450)
PROT SERPL-MCNC: 7.6 G/DL (ref 6.4–8.3)
RBC # BLD AUTO: 4.02 10E6/UL (ref 3.8–5.2)
RBC #/AREA URNS AUTO: ABNORMAL /HPF
SP GR UR STRIP: 1.02 (ref 1–1.03)
SQUAMOUS #/AREA URNS AUTO: ABNORMAL /LPF
TRANS CELLS #/AREA URNS HPF: ABNORMAL /HPF
TRICHOMONAS, WET PREP: ABNORMAL
TRIGL SERPL-MCNC: 75 MG/DL
UROBILINOGEN UR STRIP-ACNC: 0.2 E.U./DL
WBC # BLD AUTO: 6.4 10E3/UL (ref 4–11)
WBC #/AREA URNS AUTO: ABNORMAL /HPF
WBC'S/HIGH POWER FIELD, WET PREP: ABNORMAL
YEAST, WET PREP: ABNORMAL

## 2023-10-19 PROCEDURE — 87210 SMEAR WET MOUNT SALINE/INK: CPT | Performed by: FAMILY MEDICINE

## 2023-10-19 PROCEDURE — 82784 ASSAY IGA/IGD/IGG/IGM EACH: CPT | Performed by: FAMILY MEDICINE

## 2023-10-19 PROCEDURE — 99213 OFFICE O/P EST LOW 20 MIN: CPT | Mod: 25 | Performed by: FAMILY MEDICINE

## 2023-10-19 PROCEDURE — 81001 URINALYSIS AUTO W/SCOPE: CPT | Performed by: FAMILY MEDICINE

## 2023-10-19 PROCEDURE — 87624 HPV HI-RISK TYP POOLED RSLT: CPT | Performed by: FAMILY MEDICINE

## 2023-10-19 PROCEDURE — 80076 HEPATIC FUNCTION PANEL: CPT | Performed by: FAMILY MEDICINE

## 2023-10-19 PROCEDURE — 85027 COMPLETE CBC AUTOMATED: CPT | Performed by: FAMILY MEDICINE

## 2023-10-19 PROCEDURE — 90471 IMMUNIZATION ADMIN: CPT | Performed by: FAMILY MEDICINE

## 2023-10-19 PROCEDURE — 80061 LIPID PANEL: CPT | Performed by: FAMILY MEDICINE

## 2023-10-19 PROCEDURE — G0145 SCR C/V CYTO,THINLAYER,RESCR: HCPCS | Performed by: FAMILY MEDICINE

## 2023-10-19 PROCEDURE — 90682 RIV4 VACC RECOMBINANT DNA IM: CPT | Performed by: FAMILY MEDICINE

## 2023-10-19 PROCEDURE — 36415 COLL VENOUS BLD VENIPUNCTURE: CPT | Performed by: FAMILY MEDICINE

## 2023-10-19 PROCEDURE — 83036 HEMOGLOBIN GLYCOSYLATED A1C: CPT | Performed by: FAMILY MEDICINE

## 2023-10-19 PROCEDURE — 96127 BRIEF EMOTIONAL/BEHAV ASSMT: CPT | Performed by: FAMILY MEDICINE

## 2023-10-19 ASSESSMENT — PATIENT HEALTH QUESTIONNAIRE - PHQ9
SUM OF ALL RESPONSES TO PHQ QUESTIONS 1-9: 0
10. IF YOU CHECKED OFF ANY PROBLEMS, HOW DIFFICULT HAVE THESE PROBLEMS MADE IT FOR YOU TO DO YOUR WORK, TAKE CARE OF THINGS AT HOME, OR GET ALONG WITH OTHER PEOPLE: NOT DIFFICULT AT ALL
SUM OF ALL RESPONSES TO PHQ QUESTIONS 1-9: 0

## 2023-10-19 NOTE — COMMUNITY RESOURCES LIST (ENGLISH)
10/19/2023   [facility] [employer]  N/A  For questions about this resource list or additional care needs, please contact your primary care clinic or care manager.  Phone: [phone]   Email: N/A   Address: [address] [place] [id number]   Hours: N/A        Hotlines and Helplines       Hotline - Housing crisis  1  [facility] (Main Office) - Emergency Services Distance: 14.48 miles      Phone/Virtual   [address] [place] [address]  Language: English  Hours: Mon - Sun Open 24 Hours   Phone: (323) 967-3726 Email: info@[facility].org Website: http://[facility].org     2  [facility] Distance: 14.5 miles      Phone/Virtual   [address] [place] [address]  Language: English  Hours: Mon - Sun Open 24 Hours   Phone: (225) 635-4232 Email: communications@[facility].org Website: https://[facility].org/oursaviourshousing/          Housing       Coordinated Entry access point  3  [facility] Distance: 6.69 miles      In-Person, Phone/Virtual   [address] [place] [address]  Language: English, Upper sorbian  Hours: Mon - Fri 8:30 AM - 4:30 PM  Fees: Free   Phone: (945) 391-2560 Email: info@[facility].org Website: https://www.[facility].org/locations/downSt. [facility]-clinic/     4  [facility] - Housing Services Distance: 14.64 miles      In-Person   [address] [place] [address]  Language: English  Hours: Mon - Fri 9:00 AM - 5:00 PM  Fees: Free   Phone: (755) 514-6257 Email: housing@[facility].org Website: http://www.[facility].org/housing     Drop-in center or day shelter  5  [facility] Distance: 5.94 miles      In-Person   [address] [place] [address]  Language: English  Hours: Mon - Fri 9:00 AM - 4:00 PM  Fees: Free   Phone: (843) 282-6156 Email: frontwatsonk@[employer].org Website: http://[facility].org     6  [facility] Distance: 14.64 miles      In-Person   [address] [place] [address]  Language: English, Monegasque, Montenegrin  Hours: Mon - Sat 7:00 AM - 3:00 PM  Fees: Free, Self Pay   Phone: (970) 989-5301 Email: info@[employer] Website: https://www.[facility]/locations/opportunity-center/     Housing search assistance  7  [facility] - Housing Resources Distance: 1.39 miles      In-Person, Phone/Virtual   [address] [place] [address]  Language: English  Hours: Mon - Fri 8:00 AM - 4:30 PM  Fees: Free   Phone: (782) 257-8416 Email: office@[employer] Website: http://[employer]     8  [facility] [place] - Homeless Resources and Housing Information Distance: 7.18 miles      In-Person, Phone/Virtual   [address] [place] [address]  Language: English  Hours: Mon - Fri 8:00 AM - 4:30 PM  Fees: Free   Phone: (599) 680-5765 Email: cynthia@[facility]. Website: https://www.[facility]./Facilities/Facility/Details/[facility]-22     Shelter for families  9  [facility] Distance: 17.09 miles      In-Person   [address] [place] [address]  Language: English  Hours: Mon - Fri 3:00 PM - 9:00 AM , Sat - Sun Open 24 Hours  Fees: Free   Phone: (689) 333-7688 Ext.1 Website: https://www.[facility].org/2020/07/03/emergency-family-shelter/     Shelter for individuals  10  [facility] [place] [facility] [place] [facility] Distance: 6.77 miles      In-Person   [address] [place] [address]  Language: English  Hours: Mon - Sun 5:00 PM - 10:00 AM  Fees: Free, Self Pay   Phone: (244) 594-5994 Email: info@[employer] Website: https://www.[facility].org/locations/[facility]/     11  [facility] Distance: 14.5 miles      In-Person    [address] [place] [address]  Language: English  Hours: Mon - Sun Open 24 Hours  Fees: Free   Phone: (526) 641-4855 Email: communications@[facility].org Website: https://[facility].org/oursaviourshousing/          Important Numbers & Websites       Emergency Services   911  [facility] Services   311  Poison Control   (986) 134-5514  Suicide Prevention Lifeline   (133) 877-6485 (TALK)  Child Abuse Hotline   (330) 766-7932 (4-A-Child)  Sexual Assault Hotline   (982) 595-4320 (HOPE)  National Runaway Safeline   (532) 892-1829 (RUNAWAY)  All-Options Talkline   (205) 778-2751  Substance Abuse Referral   (699) 249-7381 (HELP)

## 2023-10-19 NOTE — PROGRESS NOTES
Assessment & Plan     Patient presents with:  Dizziness follow up  Referral       Roxie was seen today for dizziness follow up and referral.    Diagnoses and all orders for this visit:    Dizziness  Comments:  More likely vertigo Vs vasovagal Advised to add a Claritin as patient does have some serous fluid behind both drums.  Drink plenty of fluid and rest also checking her hemoglobin    Postmenopausal vaginal spotting  Comments:  started menopause at age 41 ( 20 yrs ago ) started irregular menstrual spotting since 2007, but feel it itching before spotting so not sure if it is from her scratching patient pelvic exam did show signs of atrophic vaginitis. patient does have fibroids -US-1/23/23.  Per patient she did had evaluation done for postmenopausal bleeding around 2016 with endometrial biopsy which showed no abnormal cells through the [facility]  No endometrial lining thickening but they were not able to evaluate because of multiple fibroids   Orders:  -     Wet prep - Clinic Collect    Screen for colon cancer  -     COLOGUARD(EXACT SCIENCES); Future    Cervical cancer screening  -     Pap Screen with HPV - recommended age 30 - 65 years; Future  -     Pap Screen with HPV - recommended age 30 - 65 years    Autoimmune hepatitis (H)  -     Adult GI  Referral - Consult Only; Future  -     Hepatic panel (Albumin, ALT, AST, Bili, Alk Phos, TP); Future  -     IgG; Future    Class 2 obesity without serious comorbidity with body mass index (BMI) of 38.0 to 38.9 in adult, unspecified obesity type  Walk daily, desk Job- for [employer]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 through out the day  Prediabetes  -     Hemoglobin A1c; Future  Lab Results   Component Value Date    A1C 5.5 12/29/2022    A1C 5.7 11/16/2021    A1C 5.7 09/17/2020    A1C 5.8 05/01/2015      Urine frequency  -     UA Macroscopic with reflex to Microscopic and Culture - Clinic Collect    Lipid screening  -     Lipid Profile;  "Future    Iron deficiency anemia secondary to inadequate dietary iron intake  -     CBC with platelets; Future    Other orders  -     PRIMARY CARE FOLLOW-UP SCHEDULING; Future  -     ZOSTER RECOMBINANT ADJUVANTED (SHINGRIX); Future  -     RSV VACCINE (ABRYSVO); Future  -     COVID-19 12+ (2023-24) (PFIZER); Future  -     INFLUENZA VACCINE 18-64Y (FLUBLOK); Future  -     PRIMARY CARE FOLLOW-UP SCHEDULING; Future  -     INFLUENZA VACCINE 18-64Y (FLUBLOK)            BMI:   Estimated body mass index is 41.54 kg/m  as calculated from the following:    Height as of 12/29/22: 1.626 m (5' 4\").    Weight as of this encounter: 109.8 kg (242 lb).   Weight management plan: Discussed healthy diet and exercise guidelines        No follow-ups on file.   Follow-up Visit   Expected date:  Jan 19, 2024 (Approximate)      Follow Up Appointment Details:     Follow-up with whom?: Me    Follow-Up for what?: Adult Preventive    How?: In Person             Follow-up Visit   Expected date:  Oct 19, 2024 (Approximate)      Follow Up Appointment Details:     Follow-up with whom?: PCP    Follow-Up for what?: Adult Preventive    How?: In Person                       Subjective   Roxie Vargas 63 year old who presents for the following health issues           HPI     Wt Readings from Last 3 Encounters:   10/19/23 109.8 kg (242 lb)   12/29/22 102.5 kg (226 lb)   09/09/22 99.8 kg (220 lb)      Dizziness  Onset/Duration: less than a 1 minute one per month . Started after she had her fibroid removal 2007. She feel it was from low hemoglobin       Do you feel faint: No  Does it feel like the surroundings (bed, room) are moving: it depends it varies time to time  Unsteady/off balance: No  Have you passed out or fallen: No  Intensity: mild  Progression of Symptoms: same  Accompanying Signs & Symptoms:  Heart palpitations or chest pain: No  Nausea, vomiting: No  Weakness or lack of coordination in arms or legs: No  Vision or speech changes: " No  Numbness or tingling: No  Ringing in ears (Tinnitus): No  Hearing Loss: No  History:   Head trauma/concussion history:   no  Previous similar symptoms:   Recent bleeding history: N/A  Any new medications (BP?): No  Precipitating factors:   Worse with activity: patient she doesn't know  Worse with head movement: N/A  Alleviating factors:   Does staying in a fixed position give relief: no   Therapies tried and outcome: None      Patient Active Problem List   Diagnosis    Anemia    Myopia    Bilateral chronic knee pain    Autoimmune hepatitis (H)    Class 2 obesity without serious comorbidity with body mass index (BMI) of 38.0 to 38.9 in adult    Sensation of cold in lower extremity    Prediabetes    History of uterine fibroid        Current Outpatient Medications   Medication    ascorbic acid (VITAMIN C) 1000 MG TABS    calcium citrate and vitamin D (CITRACAL) 200-250 MG-UNIT TABS per tablet    carboxymethylcellulose (CARBOXYMETHYLCELLULOSE SODIUM) 0.5 % SOLN ophthalmic solution    Ferrous Sulfate (IRON CR PO)    ibuprofen (ADVIL,MOTRIN) 600 MG tablet    multivitamin (CENTRUM SILVER) tablet    olopatadine (PATADAY) 0.2 % ophthalmic solution    omeprazole (PRILOSEC) 20 MG DR capsule     No current facility-administered medications for this visit.          Social Determinants of Health     Food Insecurity: Low Risk  (10/19/2023)    Food Insecurity     Within the past 12 months, did you worry that your food would run out before you got money to buy more?: No     Within the past 12 months, did the food you bought just not last and you didn t have money to get more?: No   Depression: Not at risk (10/19/2023)    PHQ-2     PHQ-2 Score: 0   Housing Stability: High Risk (10/19/2023)    Housing Stability     Do you have housing? : No     Are you worried about losing your housing?: No   Tobacco Use: Low Risk  (10/19/2023)    Patient History     Smoking Tobacco Use: Never     Smokeless Tobacco Use: Never     Passive Exposure:  Not on file   Financial Resource Strain: Low Risk  (10/19/2023)    Financial Resource Strain     Within the past 12 months, have you or your family members you live with been unable to get utilities (heat, electricity) when it was really needed?: No   Alcohol Use: Not At Risk (11/9/2021)    AUDIT-C     Frequency of Alcohol Consumption: Never     Average Number of Drinks: Patient refused     Frequency of Binge Drinking: Never   Transportation Needs: Low Risk  (10/19/2023)    Transportation Needs     Within the past 12 months, has lack of transportation kept you from medical appointments, getting your medicines, non-medical meetings or appointments, work, or from getting things that you need?: No   Physical Activity: Unknown (11/9/2021)    Exercise Vital Sign     Days of Exercise per Week: 3 days     Minutes of Exercise per Session: Not on file   Interpersonal Safety: Low Risk  (10/19/2023)    Interpersonal Safety     Do you feel physically and emotionally safe where you currently live?: Yes     Within the past 12 months, have you been hit, slapped, kicked or otherwise physically hurt by someone?: No     Within the past 12 months, have you been humiliated or emotionally abused in other ways by your partner or ex-partner?: No   Stress: No Stress Concern Present (11/9/2021)    English [place] of Occupational [employer] - Occupational Stress Questionnaire     Feeling of Stress : Not at all   Social Connections: Moderately Integrated (11/9/2021)    Social Connection and Isolation Panel [NHANES]     Frequency of Communication with Friends and Family: More than three times a week     Frequency of Social Gatherings with Friends and Family: Once a week     Attends Denominational Services: More than 4 times per year     Active Member of Clubs or Organizations: Yes     Attends Club or Organization Meetings: Not on file     Marital Status: Never         Review of Systems   Constitutional, cardiovascular, pulmonary, GI, ,  musculoskeletal, neuro, skin, endocrine and psych systems are negative, except as otherwise noted.      Objective      /78   Pulse 76   Temp 98.3  F (36.8  C) (Oral)   Wt 109.8 kg (242 lb)   SpO2 97%   BMI 41.54 kg/m       Physical Exam   GENERAL: healthy, alert and no distress  NECK: no adenopathy, no asymmetry, masses, or scars and thyroid normal to palpation  RESP: lungs clear to auscultation - no rales, rhonchi or wheezes  CV: regular rate and rhythm, normal S1 S2, no S3 or S4, no murmur, click or rub, no peripheral edema and peripheral pulses strong   (female): normal female external genitalia, normal urethral meatus, vaginal mucosa, normal cervix/adnexa/uterus without masses or discharge  Cervix easily bleed during pap  MS: no gross musculoskeletal defects noted, no edema    Camille Chopra MD   [facility].  [phone]    Answers submitted by the patient for this visit:  Patient Health Questionnaire (Submitted on 10/19/2023)  If you checked off any problems, how difficult have these problems made it for you to do your work, take care of things at home, or get along with other people?: Not difficult at all  PHQ9 TOTAL SCORE: 0  General Questionnaire (Submitted on 10/19/2023)  Chief Complaint: Chronic problems general questions HPI Form  What is the reason for your visit today? : dizzyness  How many servings of fruits and vegetables do you eat daily?: 0-1  On average, how many sweetened beverages do you drink each day (Examples: soda, juice, sweet tea, etc.  Do NOT count diet or artificially sweetened beverages)?: 1  How many minutes a day do you exercise enough to make your heart beat faster?: 30 to 60  How many days a week do you exercise enough to make your heart beat faster?: 4  How many days per week do you miss taking your medication?: 0

## 2023-10-19 NOTE — COMMUNITY RESOURCES LIST (ENGLISH)
10/19/2023   [facility] - Outpatient Clinics  N/A  For additional resource needs, please contact your health insurance member services or your primary care team.  Phone: [phone]   Email: N/A   Address: [address] [place] [address]   Hours: N/A        Hotlines and Helplines       Hotline - Housing crisis  1  [facility] (Main Office) - Emergency Services Distance: 14.48 miles      Phone/Virtual   [address] [place] [address]  Language: English  Hours: Mon - Sun Open 24 Hours   Phone: (421) 725-8612 Email: info@[facility] Website: http://[facility].EnterMedia     2  [facility] Distance: 14.5 miles      Phone/Virtual   [address] [place] [address]  Language: English  Hours: Mon - Sun Open 24 Hours   Phone: (842) 658-1919 Email: communications@[facility].org Website: https://[facility]-[place].org/oursaviourshousing/          Housing       Coordinated Entry access point  3  [facility] Distance: 6.69 miles      In-Person, Phone/Virtual   [address] [place] [address]  Language: English, German  Hours: Mon - Fri 8:30 AM - 4:30 PM  Fees: Free   Phone: (236) 234-5658 Email: info@[facility].org Website: https://www.[facility].org/locations/[facility]/     4  [facility] ([facility] - Housing Services Distance: 14.64 miles      In-Person   [address] [place] [address]  Language: English  Hours: Mon - Fri 9:00 AM - 5:00 PM  Fees: Free   Phone: (971) 721-3584 Email: housing@[facility].org Website: http://www.[facility].org/housing     Drop-in center or day shelter  5  [facility] Distance: 5.94 miles      In-Person   [address] [place] [address]  Language: English  Hours: Mon - Fri 9:00 AM - 4:00 PM  Fees: Free   Phone: (363) 424-3360 Email: josek@[employer].org Website: http://listeninghouse.org     6  [facility]  Distance: 14.64 miles      In-Person   [address] [place] [address]  Language: English, South Korean, Indonesian  Hours: Mon - Sat 7:00 AM - 3:00 PM  Fees: Free, Self Pay   Phone: (315) 304-6828 Email: info@[employer] Website: https://www.[employer]/locations/opportunity-center/     Housing search assistance  7  [employer] [employer] - https://[employer]/ Distance: 14.96 miles      Phone/Virtual   [address] [place] [address]  Language: English  Hours: Mon - Sun Open 24 Hours   Email: info@[employer] Website: https://[employer]     8  [employer] - Online housing search assistance Distance: 16.11 miles      Phone/Virtual   [address] [address]  Language: English, Hmong, South Korean, Indonesian  Hours: Mon - Sun Open 24 Hours   Phone: (439) 990-2642 Email: info@[employer].org Website: http://www.housinglink.org/     Shelter for families  9  [facility] Distance: 17.09 miles      In-Person   [address] [place] [address]  Language: English  Hours: Mon - Fri 3:00 PM - 9:00 AM , Sat - Sun Open 24 Hours  Fees: Free   Phone: (838) 312-3270 Ext.1 Website: https://www.[facility].org/2020/07/03/emergency-family-shelter/     Shelter for individuals  10  [facility] [place] [facility] [place] [facility] Distance: 6.77 miles      In-Person   [address] [place] [address]  Language: English  Hours: Mon - Sun 5:00 PM - 10:00 AM  Fees: Free, Self Pay   Phone: (368) 279-8616 Email: info@[employer] Website: https://www.[employer]/locations/[facility]-[place]/     11  [facility] Distance: 14.5 miles      In-Person   [address] [place] [address]  Language: English  Hours: Mon - Sun Open 24 Hours  Fees: Free   Phone: (559) 901-7184 Email: communications@[facility].org Website:  https://[facility]-[place].org/oursaviourshousing/          Important Numbers & Websites       [facility]   [address].org  Poison Control   (234) 667-9307 [place]  Suicide and Crisis Lifeline   [address].org  Childhelp [place] Child Abuse Hotline   251.906.7759 Childhelphotline.org  [place] Sexual Assault Hotline   (679) 962-7673 (HOPE) [facility] Runaway Safeline   (108) 460-9947 (RUNAWAY) [facility].org  Pregnancy & Postpartum Support [place]   Call/text 795-881-2081 Ppsupport[place].org  Substance Abuse National Helpline ([facility]   605-345-HELP (8720) Findtreatment.gov  Emergency Services   912

## 2023-10-20 LAB — IGG SERPL-MCNC: 1679 MG/DL (ref 610–1616)

## 2023-10-24 LAB
BKR LAB AP GYN ADEQUACY: NORMAL
BKR LAB AP GYN INTERPRETATION: NORMAL
BKR LAB AP HPV REFLEX: NORMAL
BKR LAB AP PREVIOUS ABNORMAL: NORMAL
PATH REPORT.COMMENTS IMP SPEC: NORMAL
PATH REPORT.COMMENTS IMP SPEC: NORMAL
PATH REPORT.RELEVANT HX SPEC: NORMAL

## 2023-10-25 LAB
HUMAN PAPILLOMA VIRUS 16 DNA: NEGATIVE
HUMAN PAPILLOMA VIRUS 18 DNA: NEGATIVE
HUMAN PAPILLOMA VIRUS FINAL DIAGNOSIS: NORMAL
HUMAN PAPILLOMA VIRUS OTHER HR: NEGATIVE

## 2023-10-26 ENCOUNTER — LAB (OUTPATIENT)
Dept: FAMILY MEDICINE | Facility: CLINIC | Age: 63
End: 2023-10-26
Payer: COMMERCIAL

## 2023-10-26 DIAGNOSIS — Z12.11 SCREEN FOR COLON CANCER: ICD-10-CM

## 2023-10-26 PROBLEM — Z12.4 CERVICAL CANCER SCREENING: Status: ACTIVE | Noted: 2023-10-26

## 2023-11-16 LAB — NONINV COLON CA DNA+OCC BLD SCRN STL QL: NEGATIVE

## 2023-12-21 ENCOUNTER — OFFICE VISIT (OUTPATIENT)
Dept: FAMILY MEDICINE | Facility: CLINIC | Age: 63
End: 2023-12-21
Attending: FAMILY MEDICINE
Payer: COMMERCIAL

## 2023-12-21 VITALS
HEART RATE: 80 BPM | HEIGHT: 64 IN | WEIGHT: 242 LBS | DIASTOLIC BLOOD PRESSURE: 72 MMHG | TEMPERATURE: 96.9 F | BODY MASS INDEX: 41.32 KG/M2 | SYSTOLIC BLOOD PRESSURE: 118 MMHG | OXYGEN SATURATION: 99 %

## 2023-12-21 DIAGNOSIS — Z00.01 ENCOUNTER FOR ROUTINE ADULT MEDICAL EXAM WITH ABNORMAL FINDINGS: Primary | ICD-10-CM

## 2023-12-21 DIAGNOSIS — K75.4 AUTOIMMUNE HEPATITIS (H): ICD-10-CM

## 2023-12-21 DIAGNOSIS — Z12.31 VISIT FOR SCREENING MAMMOGRAM: ICD-10-CM

## 2023-12-21 PROCEDURE — 90480 ADMN SARSCOV2 VAC 1/ONLY CMP: CPT | Performed by: FAMILY MEDICINE

## 2023-12-21 PROCEDURE — 96381 ADMN RSV MONOC ANTB IM NJX: CPT | Performed by: FAMILY MEDICINE

## 2023-12-21 PROCEDURE — 90678 RSV VACC PREF BIVALENT IM: CPT | Performed by: FAMILY MEDICINE

## 2023-12-21 PROCEDURE — 99396 PREV VISIT EST AGE 40-64: CPT | Mod: 25 | Performed by: FAMILY MEDICINE

## 2023-12-21 PROCEDURE — 91320 SARSCV2 VAC 30MCG TRS-SUC IM: CPT | Performed by: FAMILY MEDICINE

## 2023-12-21 ASSESSMENT — ENCOUNTER SYMPTOMS
DYSURIA: 0
HEARTBURN: 0
PALPITATIONS: 0
WEAKNESS: 0
DIARRHEA: 0
EYE PAIN: 0
HEMATOCHEZIA: 0
ARTHRALGIAS: 1
FREQUENCY: 0
NAUSEA: 0
JOINT SWELLING: 0
ABDOMINAL PAIN: 0
SHORTNESS OF BREATH: 0
HEMATURIA: 0
DIZZINESS: 0
FEVER: 0
SORE THROAT: 0
HEADACHES: 0
COUGH: 0
PARESTHESIAS: 0
MYALGIAS: 0
NERVOUS/ANXIOUS: 0
BREAST MASS: 0
CONSTIPATION: 0
CHILLS: 0

## 2023-12-21 NOTE — PROGRESS NOTES
SUBJECTIVE:   CC: Roxie Vargas 63 year old   who presents for preventive health visit.       Patient has been advised of split billing requirements and indicates understanding: Yes    I spent 5 minutes with the patient total  from the the prevent visit, >50% of which was in counseling regarding the patient's medical issues as noted above.      Healthy Habits:     Getting at least 3 servings of Calcium per day:  Yes    Bi-annual eye exam:  Yes    Dental care twice a year:  Yes    Sleep apnea or symptoms of sleep apnea:  None    Diet:  Regular (no restrictions)    Frequency of exercise:  2-3 days/week    Duration of exercise:  15-30 minutes    Taking medications regularly:  Yes    Medication side effects:  Not applicable    Additional concerns today:  No        ASSESSMENT/PLAN:   Roxie was seen today for physical.    Diagnoses and all orders for this visit:    Encounter for routine adult medical exam with abnormal findings    Visit for screening mammogram  -     MA SCREENING DIGITAL BILAT - Future  (s+30); Future    Autoimmune hepatitis (H)  Comments:  LAB  done 2 months ago which were in good range    Other orders  -     PRIMARY CARE FOLLOW-UP SCHEDULING; Future  -     REVIEW OF HEALTH MAINTENANCE PROTOCOL ORDERS  -     RSV VACCINE (ABRYSVO)  -     COVID-19 12+ (2023-24) (PFIZER)  -     Cancel: INFLUENZA VACCINE 18-64Y (FLUBLOK)  -     ZOSTER RECOMBINANT ADJUVANTED (SHINGRIX)  -     PRIMARY CARE FOLLOW-UP SCHEDULING        Patient has been advised of split billing requirements and indicates understanding: Yes        9/17/2020     4:00 PM 2/3/2021     9:00 AM 10/19/2023     2:57 PM   PHQ   PHQ-9 Total Score 0 0 0   Q9: Thoughts of better off dead/self-harm past 2 weeks Not at all Not at all Not at all        Today's PHQ-2 Score:       10/19/2023     2:57 PM   PHQ-2 ( 1999 Pfizer)   Q1: Little interest or pleasure in doing things 3   Q2: Feeling down, depressed or hopeless 0   PHQ-2 Score 3   Q1: Little  "interest or pleasure in doing things Nearly every day   Q2: Feeling down, depressed or hopeless Not at all   PHQ-2 Score 3       Abuse: Current or Past (Physical, Sexual or Emotional) - No  Do you feel safe in your environment? Yes    Have you ever done Advance Care Planning? (For example, a Health Directive, POLST, or a discussion with a medical provider or your loved ones about your wishes): No, advance care planning information given to patient to review.  Patient plans to discuss their wishes with loved ones or provider.      Social History     Tobacco Use    Smoking status: Never    Smokeless tobacco: Never   Substance Use Topics    Alcohol use: No     Alcohol/week: 0.0 standard drinks of alcohol           COUNSELING:  Reviewed preventive health counseling, as reflected in patient instructions       Regular exercise       Healthy diet/nutrition       Vision screening       Immunizations  Vaccinated for: Covid-19  and RSV         Colorectal Cancer Screening       Advance Care Planning    Estimated body mass index is 41.54 kg/m  as calculated from the following:    Height as of this encounter: 1.626 m (5' 4\").    Weight as of this encounter: 109.8 kg (242 lb).    Weight management plan: Discussed healthy diet and exercise guidelines    She reports that she has never smoked. She has never used smokeless tobacco.      Reviewed orders with patient.  Reviewed health maintenance and updated orders accordingly - Yes  Lab work is in process  Labs reviewed in EPIC  BP Readings from Last 3 Encounters:   12/21/23 118/72   10/19/23 128/78   12/29/22 122/80    Wt Readings from Last 3 Encounters:   12/21/23 109.8 kg (242 lb)   10/19/23 109.8 kg (242 lb)   12/29/22 102.5 kg (226 lb)                 Patient Active Problem List   Diagnosis    Anemia    Myopia    Bilateral chronic knee pain    Autoimmune hepatitis (H)    Class 2 obesity without serious comorbidity with body mass index (BMI) of 38.0 to 38.9 in adult    Sensation " of cold in lower extremity    Prediabetes    History of uterine fibroid    Cervical cancer screening     Past Surgical History:   Procedure Laterality Date    COLONOSCOPY  4/1/2013    Procedure: COLONOSCOPY;;  Surgeon: Tobias Mecrhant MD;  Location:  GI    dilation & curretage        uterine fibroids removed[  2005    wisdom teeth[         Social History     Tobacco Use    Smoking status: Never    Smokeless tobacco: Never   Substance Use Topics    Alcohol use: No     Alcohol/week: 0.0 standard drinks of alcohol     Family History   Problem Relation Age of Onset    Asthma Sister     Peptic Ulcer Disease Sister         and 2 nephews    Hypertension Mother            Current Outpatient Medications   Medication Sig Dispense Refill    ascorbic acid (VITAMIN C) 1000 MG TABS Take 1,000 mg by mouth daily.      calcium citrate and vitamin D (CITRACAL) 200-250 MG-UNIT TABS per tablet Take 1 tablet by mouth 2 times daily      carboxymethylcellulose (CARBOXYMETHYLCELLULOSE SODIUM) 0.5 % SOLN ophthalmic solution Place 1-2 drops into both eyes 3 times daily as needed for dry eyes 1 Bottle 3    Ferrous Sulfate (IRON CR PO) Take 1 tablet by mouth daily.      ibuprofen (ADVIL,MOTRIN) 600 MG tablet Take 600 mg by mouth every 6 hours as needed.      multivitamin (CENTRUM SILVER) tablet Take 1 tablet by mouth daily      olopatadine (PATADAY) 0.2 % ophthalmic solution Place 1 drop into both eyes daily 1 Bottle 11    omeprazole (PRILOSEC) 20 MG DR capsule Take 1 capsule (20 mg) by mouth daily 60 capsule 1     Allergies   Allergen Reactions    Shrimp Anaphylaxis    Aspirin Other (See Comments)     Watery eyes. Happened a long time ago.        Recent Labs   Lab Test 10/19/23  1620 12/29/22  1621 09/01/22  0724 05/12/22  1252 11/16/21  0954 09/17/20  1518 06/26/20  0959 06/05/19  1213 12/23/15  1609 11/10/15  1129   A1C 5.5 5.5  --   --  5.7*   < >  --   --   --   --    * 132*  --   --  129   < >  --   --    < >  --    HDL 53 64  --    --  53   < >  --   --    < > 48*   TRIG 75 62  --   --  58   < >  --   --    < >  --    ALT 9 7* <9 <9 <9   < > 16 14   < >  --    CR  --   --  0.68 0.74  --    < > 0.67 0.68   < >  --    GFRESTIMATED  --   --  >90 >90  --    < > >90 >90   < >  --    GFRESTBLACK  --   --   --   --   --   --  >90 >90   < >  --    POTASSIUM  --   --  3.7 4.0  --    < > 3.5 3.5   < >  --    TSH  --   --   --   --   --   --   --   --   --  2.10    < > = values in this interval not displayed.        Breast Cancer Screening:  Any new diagnosis of family breast, ovarian, or bowel cancer? No    Pertinent mammograms are reviewed under the imaging tab.    History of abnormal Pap smear: NO - age 30-65 PAP every 5 years with negative HPV co-testing recommended      Latest Ref Rng & Units 10/19/2023     4:20 PM 9/17/2020     3:19 PM 12/20/2019     4:32 PM   PAP / HPV   PAP  Negative for Intraepithelial Lesion or Malignancy (NILM)  Negative for squamous intraepithelial lesion or malignancy  Electronically signed by Jose Fernandez CT (ASCP) on 9/25/2020 at  1:49 PM       PAP (Historical)    NIL    HPV 16 DNA Negative Negative  Negative  Negative    HPV 18 DNA Negative Negative  Negative  Negative    Other HR HPV Negative Negative  Negative  Negative      Reviewed and updated as needed this visit by clinical staff   Tobacco  Allergies  Meds  Problems  Med Hx  Surg Hx  Fam Hx          Reviewed and updated as needed this visit by Provider   Tobacco  Allergies  Meds  Problems  Med Hx  Surg Hx  Fam Hx         Past Medical History:   Diagnosis Date    Autoimmune hepatitis (H)     Fibroids     Gastroesophageal reflux disease with esophagitis without hemorrhage 2/3/2021    Heartburn     Iron deficiency anemia     Morbid obesity (H) 10/24/2019    Pain in thumb joint with movement of left hand 1/2/2017    Radial styloid tenosynovitis 1/2/2017    Ulcer       Past Surgical History:   Procedure Laterality Date    COLONOSCOPY  4/1/2013     "Procedure: COLONOSCOPY;;  Surgeon: Tobias Merchant MD;  Location: [facility]    dilation & curretage        uterine fibroids removed[  2005    wisdom teeth[         Review of Systems   Constitutional:  Negative for chills and fever.   HENT:  Negative for congestion, ear pain, hearing loss and sore throat.    Eyes:  Negative for pain and visual disturbance.   Respiratory:  Negative for cough and shortness of breath.    Cardiovascular:  Negative for chest pain, palpitations and peripheral edema.   Gastrointestinal:  Negative for abdominal pain, constipation, diarrhea, heartburn, hematochezia and nausea.   Breasts:  Negative for tenderness, breast mass and discharge.   Genitourinary:  Negative for dysuria, frequency, genital sores, hematuria, pelvic pain, urgency, vaginal bleeding and vaginal discharge.   Musculoskeletal:  Positive for arthralgias. Negative for joint swelling and myalgias.   Skin:  Negative for rash.   Neurological:  Negative for dizziness, weakness, headaches and paresthesias.   Psychiatric/Behavioral:  Negative for mood changes. The patient is not nervous/anxious.           OBJECTIVE:   /72   Pulse 80   Temp 96.9  F (36.1  C) (Temporal)   Ht 1.626 m (5' 4\")   Wt 109.8 kg (242 lb)   SpO2 99%   BMI 41.54 kg/m    Physical Exam  GENERAL: healthy, alert and no distress  EYES: Eyes grossly normal to inspection, PERRL and conjunctivae and sclerae normal  HENT: ear canals and TM's normal, nose and mouth without ulcers or lesions  NECK: no adenopathy, no asymmetry, masses, or scars and thyroid normal to palpation  RESP: lungs clear to auscultation - no rales, rhonchi or wheezes  BREAST: normal without masses, tenderness or nipple discharge and no palpable axillary masses or adenopathy  CV: regular rate and rhythm, normal S1 S2, no S3 or S4, no murmur, click or rub, no peripheral edema and peripheral pulses strong  ABDOMEN: soft, nontender, no hepatosplenomegaly, no masses and bowel sounds normal   " (female): deferred  MS: no gross musculoskeletal defects noted, no edema  SKIN: no suspicious lesions or rashes  PSYCH: mentation appears normal, affect normal/bright    Diagnostic Test Results:  Labs reviewed in Albina Chopra MD  [facility]

## 2023-12-21 NOTE — COMMUNITY RESOURCES LIST (ENGLISH)
12/21/2023   [facility] [employer]  N/A  For questions about this resource list or additional care needs, please contact your primary care clinic or care manager.  Phone: [phone]   Email: N/A   Address: [facility] [place] [address]   Hours: N/A        Hotlines and Helplines       Hotline - Housing crisis  1  [facility] (Main Office) Distance: 14.48 miles      Phone/Virtual   [address] [place] [address]  Language: English  Hours: Mon - Sun Open 24 Hours   Phone: (129) 787-1929 Email: [email or web address] Website: [email or web address]     2  [facility] Distance: 14.5 miles      Phone/Virtual   [address] [place] [address]  Language: English  Hours: Mon - Sun Open 24 Hours   Phone: (775) 973-6290 Email: communications@[facility].org Website: [email or web address]-[email or web address]/oursaviourshousing/          Housing       Coordinated Entry access point  3  [facility] Distance: 6.73 miles      In-Person, Phone/Virtual   [address] [place] [address]  Language: English  Hours: Mon - Fri 8:30 AM - 4:30 PM  Fees: Free   Phone: (937) 316-9671 Email: info@[facility].org Website: https://www.[facility].org/locations/[facility]/     4  [facility] ([facility] - Housing Services Distance: 14.64 miles      In-Person   [address] [place] [address]  Language: English  Hours: Mon - Fri 9:00 AM - 5:00 PM  Fees: Free   Phone: (980) 149-2205 Email: housing@[facility].org Website: http://www.[facility].org/housing     Drop-in center or day shelter  5  [facility] Distance: 5.94 miles      In-Person   [address] [place] [address]  Language: English  Hours: Mon - Fri 9:00 AM - 4:00 PM  Fees: Free   Phone: (894) 411-1131 Email: bin@[employer].org Website: http://[employer].org     6  [facility] and [place] - [facility] Distance: 14.64 miles       In-Person   [address] [place] [address]  Language: English, [place], [place]  Hours: Mon - Sat 7:00 AM - 3:00 PM  Fees: Free, Self Pay   Phone: (429) 467-2711 Email: info@[employer] Website: https://www.[employer]/locations/opportunity-center/     Housing search assistance  7  [facility] - Housing Resources Distance: 1.39 miles      In-Person, Phone/Virtual   [address] [place] [address]  Language: English  Hours: Mon - Fri 8:00 AM - 4:30 PM  Fees: Free   Phone: (799) 223-7534 Email: office@[employer] Website: http://[employer]     8  [facility] - Homeless Resources and Housing Information - Housing search assistance Distance: 7.18 miles      In-Person, Phone/Virtual   [address] [place] [address]  Language: English  Hours: Mon - Fri 8:00 AM - 4:30 PM  Fees: Free   Phone: (774) 142-1754 Email: cynthia@[facility]. Website: https://www.[facility]./Facilities/Facility/Details/[place]-22     Shelter for families  9  [facility] Distance: 17.09 miles      In-Person   [address] [place] [address]  Language: English  Hours: Mon - Fri 3:00 PM - 9:00 AM , Sat - Sun Open 24 Hours  Fees: Free   Phone: (832) 817-8233 Ext.1 Website: https://www.[facility].org/2020/07/03/emergency-family-shelter/     Shelter for individuals  10  [facility] [place] [facility] [place] [facility] Distance: 6.77 miles      In-Person   [address] [place] [address]  Language: English  Hours: Mon - Sun 5:00 PM - 10:00 AM  Fees: Free, Self Pay   Phone: (844) 874-7097 Email: info@[employer] Website: https://www.[facility].org/locations/[facility]/     11  [facility] - [facility] - [place] - [facility]  Resources and Housing Information - Emergency housing Distance: 7.18 miles      In-Person, Phone/Virtual   [address] [place] [address]  Language: English  Hours: Mon - Fri 8:00 AM - 4:30 PM  Fees: Free   Phone: (365) 193-5832 Email: cynthia@[facility]. Website: https://www.[facility]./Facilities/Facility/Details/[place]-22          Important Numbers & Websites       Emergency Services   911  [facility] Services   311  Poison Control   (537) 891-3521  Suicide Prevention Lifeline   (417) 216-3098 (TALK)  Child Abuse Hotline   (953) 657-9705 (4-A-Child)  Sexual Assault Hotline   (698) 906-6824 (HOPE)  National Runaway Safeline   (436) 255-4381 (RUNAWAY)  All-Options Talkline   (917) 253-8469  Substance Abuse Referral   (289) 182-9402 (HELP)

## 2024-01-09 ENCOUNTER — OFFICE VISIT (OUTPATIENT)
Dept: FAMILY MEDICINE | Facility: CLINIC | Age: 64
End: 2024-01-09
Payer: COMMERCIAL

## 2024-01-09 ENCOUNTER — ANCILLARY PROCEDURE (OUTPATIENT)
Dept: GENERAL RADIOLOGY | Facility: CLINIC | Age: 64
End: 2024-01-09
Attending: FAMILY MEDICINE
Payer: COMMERCIAL

## 2024-01-09 VITALS
BODY MASS INDEX: 41.07 KG/M2 | SYSTOLIC BLOOD PRESSURE: 138 MMHG | WEIGHT: 240.6 LBS | HEART RATE: 83 BPM | DIASTOLIC BLOOD PRESSURE: 84 MMHG | OXYGEN SATURATION: 100 % | HEIGHT: 64 IN | TEMPERATURE: 97.6 F

## 2024-01-09 DIAGNOSIS — M54.16 LUMBAR RADICULOPATHY: ICD-10-CM

## 2024-01-09 DIAGNOSIS — M25.562 BILATERAL CHRONIC KNEE PAIN: ICD-10-CM

## 2024-01-09 DIAGNOSIS — M25.851 HIP IMPINGEMENT SYNDROME, RIGHT: ICD-10-CM

## 2024-01-09 DIAGNOSIS — G89.29 BILATERAL CHRONIC KNEE PAIN: ICD-10-CM

## 2024-01-09 DIAGNOSIS — M25.851 HIP IMPINGEMENT SYNDROME, RIGHT: Primary | ICD-10-CM

## 2024-01-09 DIAGNOSIS — M25.561 BILATERAL CHRONIC KNEE PAIN: ICD-10-CM

## 2024-01-09 PROCEDURE — 73502 X-RAY EXAM HIP UNI 2-3 VIEWS: CPT | Mod: TC | Performed by: RADIOLOGY

## 2024-01-09 PROCEDURE — 99214 OFFICE O/P EST MOD 30 MIN: CPT | Performed by: FAMILY MEDICINE

## 2024-01-09 NOTE — PROGRESS NOTES
Roxie was seen today for back pain.    Diagnoses and all orders for this visit:    Hip impingement syndrome, right  Comments:  hip showing mild arthritis on right side on my personal review will wait for radiologist report. meanwhile limit [employer] dance and try PT.  Orders:  -     XR Hip Right 2-3 Views; Future  -     Physical Therapy Referral; Future  -     tiZANidine (ZANAFLEX) 4 MG tablet; Take 1 tablet (4 mg) by mouth 3 times daily    Lumbar radiculopathy  Comments:  last MRI showing no herniated disc or impingement, will offer PT first  Orders:  -     Physical Therapy Referral; Future  -     tiZANidine (ZANAFLEX) 4 MG tablet; Take 1 tablet (4 mg) by mouth 3 times daily    Bilateral chronic knee pain  Comments:  continue summing exercising which are helping  Orders:  -     Physical Therapy Referral; Future  -     tiZANidine (ZANAFLEX) 4 MG tablet; Take 1 tablet (4 mg) by mouth 3 times daily         Subjective     Roxie Bowlingjwclaude 63 year old  presenting for the following health issues:    Patient presents with:  Back Pain: Worsened in the past 2 weeks. Tried to cut out time at the gym thinking this was causing her pain, did not help. Low back pain, radiates down her right leg. When lying down and wakes up in the night it feels that her leg is locked up, pain is very severe for her.            1/9/2024     8:31 AM   Additional Questions   Roomed by Marivel PRINCE MA       History of Present Illness       Back Pain:  She presents for follow up of back pain. Patient's back pain is a chronic problem.  Location of back pain:  Right lower back, right middle of back, right buttock and right side of waist  Description of back pain: cramping and shooting  Back pain spreads: right buttocks    Since patient first noticed back pain, pain is: always present, but gets better and worse  Does back pain interfere with her job:  No       She eats 0-1 servings of fruits and vegetables daily.She consumes 1 sweetened beverage(s)  daily.She exercises with enough effort to increase her heart rate 30 to 60 minutes per day.  She exercises with enough effort to increase her heart rate 3 or less days per week.   She is taking medications regularly.         Pain History:  When did you first notice your pain? 3 years ago   Have you seen this provider for your pain in the past? Yes   Where in your body do you have pain? Low back  Are you seeing anyone else for your pain? No       IMPRESSION: MRI 2021  1.  Good anatomic alignment and vertebral body heights maintained.  2.  No significant canal compromise throughout cervical region.  3.  At the L4-L5 disc space level there is mild bilateral lateral recess narrowing and minimal bilateral neural foraminal narrowing.  4.  At the L5-S1 disc space level there is mild right greater than left lateral recess narrowing.        9/17/2020     4:00 PM 2/3/2021     9:00 AM 10/19/2023     2:57 PM   PHQ-9 SCORE   PHQ-9 Total Score MyChart   0   PHQ-9 Total Score 0 0 0       [id number]}        Chronic Pain Follow Up:    Location of pain: right hip more then left (recently started doing [employer] classes twice a week since then feeling clogged sensation on the right hip when she getting up from the sitting position)  Analgesia/pain control:    - Recent changes: Increased pain   - Overall control: Tolerable with discomfort    - Current treatments: None  Adherence:     - Do you ever take more pain medicine than prescribed? No    - When did you take your last dose of pain medicine?  2 days ago ibuprofen as needed  Adverse effects: No   PDMP Review       None          Last CSA Agreement:   CSA -- Patient Level:    CSA: None found at the patient level.           Patient Active Problem List   Diagnosis    Anemia    Myopia    Bilateral chronic knee pain    Autoimmune hepatitis (H)    Class 2 obesity without serious comorbidity with body mass index (BMI) of 38.0 to 38.9 in adult    Sensation of cold in lower extremity    Prediabetes     "History of uterine fibroid    Cervical cancer screening      Social History     Social History Narrative    Immigrated from [place] 1982.             How much exercise per week? Daily activities    How much calcium per day? supplements       How much caffeine per day? Tea 1     How much vitamin D per day? In supplements    Do you/your family wear seatbelts?  Yes    Do you/your family use safety helmets? Yes    Do you/your family use sunscreen? Yes    Do you/your family keep firearms in the home? No    Do you/your family have a smoke detector(s)? Yes            Reviewed cmckim lpn 12-      Current Outpatient Medications   Medication    ascorbic acid (VITAMIN C) 1000 MG TABS    calcium citrate and vitamin D (CITRACAL) 200-250 MG-UNIT TABS per tablet    carboxymethylcellulose (CARBOXYMETHYLCELLULOSE SODIUM) 0.5 % SOLN ophthalmic solution    Ferrous Sulfate (IRON CR PO)    ibuprofen (ADVIL,MOTRIN) 600 MG tablet    multivitamin (CENTRUM SILVER) tablet    olopatadine (PATADAY) 0.2 % ophthalmic solution    omeprazole (PRILOSEC) 20 MG DR capsule    tiZANidine (ZANAFLEX) 4 MG tablet     No current facility-administered medications for this visit.            Review of Systems   Constitutional, HEENT, cardiovascular, pulmonary, gi and gu systems are negative, except as otherwise noted.      Wt Readings from Last 3 Encounters:   01/09/24 109.1 kg (240 lb 9.6 oz)   12/21/23 109.8 kg (242 lb)   10/19/23 109.8 kg (242 lb)      Objective      /84 (BP Location: Left arm, Patient Position: Sitting, Cuff Size: Adult Regular)   Pulse 83   Temp 97.6  F (36.4  C) (Temporal)   Ht 1.626 m (5' 4\")   Wt 109.1 kg (240 lb 9.6 oz)   SpO2 100%   BMI 41.30 kg/m     Physical Exam   GENERAL: healthy, alert and no distress  NECK: no adenopathy, no asymmetry, masses, or scars and thyroid normal to palpation  RESP: lungs clear to auscultation - no rales, rhonchi or wheezes  CV: regular rate and rhythm, normal S1 S2, no S3 or S4, no " murmur, click or rub, no peripheral edema and peripheral pulses strong  ABDOMEN: soft, nontender, no hepatosplenomegaly, no masses and bowel sounds normal  MS: no gross musculoskeletal defects noted, no edema  Slight restricted range of motion on the right hip on external and internal rotation   CXR - Reviewed and interpreted by me see report from Radiologist  Lab on 10/26/2023   Component Date Value Ref Range Status    COLOGUARD-ABSTRACT 11/08/2023 Negative  Negative Final    Comment:   NEGATIVE TEST RESULT. A negative Cologuard result indicates a low likelihood that a colorectal cancer (CRC) or advanced adenoma (adenomatous polyps with more advanced pre-malignant features)  is present. The chance that a person with a negative Cologuard test has a colorectal cancer is less than 1 in 1500 (negative predictive value >99.9%) or has an  advanced adenoma is less than  5.3% (negative predictive value 94.7%). These data are based on a prospective cross-sectional study of 10,000 individuals at average risk for colorectal cancer who were screened with both Cologuard and colonoscopy. (Jean Carlos CHOI et al, N Engl J Med 2014;370(14):5127-9292) The normal value (reference range) for this assay is negative.    COLOGUARD RE-SCREENING RECOMMENDATION: Periodic colorectal cancer screening is an important part of preventive healthcare for asymptomatic individuals at average risk for colorectal cancer.  Following a negative Cologuard result, the American Cancer Society and U.S.                            Multi-Society Task Force screening guidelines recommend a Cologuard re-screening interval of 3 years.   References: American Cancer Society Guideline for Colorectal Cancer Screening: https://www.cancer.org/cancer/colon-rectal-cancer/ossvbmoup-pupwyhtku-mpmtbgy/acs-recommendations.html.; Dewayne CHAMBERLAIN, Eliz MCINTYRE, Dominik HOWE, Colorectal Cancer Screening: Recommendations for Physicians and Patients from the U.S. Multi-Society Task Force on  Colorectal Cancer Screening , Am J Gastroenterology 2017; 112:1065-9991.    TEST DESCRIPTION: Composite algorithmic analysis of stool DNA-biomarkers with hemoglobin immunoassay.   Quantitative values of individual biomarkers are not reportable and are not associated with individual biomarker result reference ranges. Cologuard is intended for colorectal cancer screening of adults of either sex, 45 years or older, who are at average-risk for colorectal cancer (CRC). Cologuard has been approved for use by the U.S. FDA. The performance of Cologuard was                            established in a cross sectional study of average-risk adults aged 50-84. Cologuard performance in patients ages 45 to 49 years was estimated by sub-group analysis of near-age groups. Colonoscopies performed for a positive result may find as the most clinically significant lesion: colorectal cancer [4.0%], advanced adenoma (including sessile serrated polyps greater than or equal to 1cm diameter) [20%] or non- advanced adenoma [31%]; or no colorectal neoplasia [45%]. These estimates are derived from a prospective cross-sectional screening study of 10,000 individuals at average risk for colorectal cancer who were screened with both Cologuard and colonoscopy. (Jean Carlos Moody al, N Engl J Med 2014;370(14):6399-4722.) Cologuard may produce a false negative or false positive result (no colorectal cancer or precancerous polyp present at colonoscopy follow up). A negative Cologuard test result does not guarantee the absence of CRC or advanced adenoma (pre-cancer). The current Cologuard                            screening interval is every 3 years. (American Cancer Society and U.S. Multi-Society Task Force). Cologuard performance data in a 10,000 patient pivotal study using colonoscopy as the reference method can be accessed at the following location: www.AVIA.com/results. Additional description of the Cologuard test process, warnings and  precautions can be found at www.cologuard.com.         Camille Chopra MD 1/9/2024    [facility].  [phone]

## 2024-01-17 ENCOUNTER — THERAPY VISIT (OUTPATIENT)
Dept: PHYSICAL THERAPY | Facility: REHABILITATION | Age: 64
End: 2024-01-17
Attending: FAMILY MEDICINE
Payer: COMMERCIAL

## 2024-01-17 DIAGNOSIS — M25.851 HIP IMPINGEMENT SYNDROME, RIGHT: ICD-10-CM

## 2024-01-17 DIAGNOSIS — M54.16 LUMBAR RADICULOPATHY: ICD-10-CM

## 2024-01-17 DIAGNOSIS — M25.561 BILATERAL CHRONIC KNEE PAIN: ICD-10-CM

## 2024-01-17 DIAGNOSIS — M25.562 BILATERAL CHRONIC KNEE PAIN: ICD-10-CM

## 2024-01-17 DIAGNOSIS — G89.29 BILATERAL CHRONIC KNEE PAIN: ICD-10-CM

## 2024-01-17 PROCEDURE — 97110 THERAPEUTIC EXERCISES: CPT | Mod: GP | Performed by: PHYSICAL THERAPIST

## 2024-01-17 PROCEDURE — 97161 PT EVAL LOW COMPLEX 20 MIN: CPT | Mod: GP | Performed by: PHYSICAL THERAPIST

## 2024-01-17 NOTE — PROGRESS NOTES
PHYSICAL THERAPY EVALUATION  Type of Visit: Evaluation    See electronic medical record for Abuse and Falls Screening details.    Subjective       Presenting condition or subjective complaint:    Roxie notes she has had back pain for awhile, but worsening of mid-line LBP and R lateral hip pain. Most pain when she wakes up during the night to go to the bathroom-- very intense. She is unable to move, has to stop and let it settle down before she can continue to the bathroom.   She denies any shooting pain down her legs apart from radiating pain from low back to outer R hip.   No changes in bowel or bladder. Denies N/T anywhere (including saddle anesthesia). Denies any pain with lifting.    Aggravating factors: Prolonged sitting, standing for prolonged periods, walking, stair climbing     Relieving factors: Going to the gym (does [employer])- but hasn't gone in past 2 weeks as the pain has worsened.      She also endorses bilateral knee pain, stating she has arthritis in both knees.     Date of onset: 01/03/24 (About 2 weeks ago pain started worsening which prompted pt to seek additional care from doctor & get referral to PT)    Relevant medical history:   Anemia, arthritis  Dates & types of surgery:  None    Prior diagnostic imaging/testing results:     X-ray  Prior therapy history for the same diagnosis, illness or injury:    No    Prior Level of Function  Transfers: Independent  Ambulation: Independent  ADL: Independent  IADL:     Living Environment  Social support:   Lives alone  Type of home:   1-level  Stairs to enter the home:       No  Ramp:     Stairs inside the home:       No  Help at home:  None  Equipment owned:       Employment:    Yes  Hobbies/Interests:  Exercise, [employer], meet with friends/family    Patient goals for therapy:  Move freely at night to use bathroom    Pain assessment:      Objective   LUMBAR SPINE EVALUATION  PAIN:   INTEGUMENTARY (edema, incisions):   POSTURE:  Increased lumbar lordosis,  forward head, rounded shoulders  GAIT:   Weightbearing Status:   Assistive Device(s):   Gait Deviations:  Increased forward trunk lean  BALANCE/PROPRIOCEPTION:  Right: 2 seconds; Left 5 seconds; aashish hip drop  WEIGHTBEARING ALIGNMENT:   NON-WEIGHTBEARING ALIGNMENT:    ROM:   Lumbar ROM  -Flexion: WNL, minimal R sided LBP   -Extension: Minimally limited, min midline LBP   -Rotation: Minimally limited, no pain  -Side-bending: Just above knee joint line aashish, mild pain aashish    PELVIC/SI SCREEN:   STRENGTH:  Hip flexion: 4/5 aashish  Hip extension: 4-/5 aashish, incr lateral R hip pain with R extension   Hip abduction: 4-/5 aashish     Knee extension: 5/5 aashish  Knee flexion: 5/5 aashish  Ankle DF: 5/5 aashish      MYOTOMES:   DTR S:   CORD SIGNS:   DERMATOMES:   NEURAL TENSION:  SLR: (-) aashish  FLEXIBILITY:  Minimally limited hamstring flexibility aashish R>L  LUMBAR/HIP Special Tests:  Hip scour: (-)  BLANE: (+) right for lateral right hip pain, (-) left  FADIR: (-) aashish    PELVIS/SI SPECIAL TESTS:  Sacral thrust: (-)  FUNCTIONAL TESTS:  Repeated lumbar flexion: No change  Repeated lumbar extension: Mild R sided LBP  PALPATION:  No TTP throughout lumbar spine, TTP R proximal glute max. No TTP piriformis, glute med/min/TFL.   SPINAL SEGMENTAL CONCLUSIONS:  Minimal hypomobility noted throughout thoracic spine & lower lumbar spine       Assessment & Plan   CLINICAL IMPRESSIONS  Medical Diagnosis: Hip impingement syndrome, right, Lumbar radiculopathy, Bilateral chronic knee pain    Treatment Diagnosis: Hip impingement syndrome, right, Lumbar radiculopathy, Bilateral chronic knee pain   Impression/Assessment: Patient is a 63 year old female with bilateral R>L acute on chronic low back pain with newer R sided lateral hip pain that started to worsen 2 weeks ago (has been steady since). No specific LUIS. The following significant findings have been identified: Pain, Decreased joint mobility, Decreased strength, Impaired balance, Impaired muscle performance,  Decreased activity tolerance, and Impaired posture. These impairments interfere with their ability to perform work tasks, recreational activities, household chores, and community mobility as compared to previous level of function.     Clinical Decision Making (Complexity):  Clinical Presentation: Stable/Uncomplicated  Clinical Presentation Rationale: based on medical and personal factors listed in PT evaluation  Clinical Decision Making (Complexity): Low complexity    PLAN OF CARE  Treatment Interventions:  Modalities: E-stim  Interventions: Manual Therapy, Neuromuscular Re-education, Therapeutic Activity, Therapeutic Exercise, Self-Care/Home Management    Long Term Goals     PT Goal 1  Goal Identifier: HEP  Goal Description: In 30 days, pt will demonstrate understanding of and independence with their HEP.  Goal Progress: Initial  Target Date: 02/16/24  PT Goal 2  Goal Identifier: Sitting  Goal Description: In 60 days, pt will be able to sit for at least 1 hour with no LBP or R hip pain in order to carry out job duties.  Goal Progress: Initial  Target Date: 03/17/24  PT Goal 3  Goal Identifier: Waking up  Goal Description: In 60 days, pt will report <2/10 LBP (notably R side) upon waking up during the night or in the morning to go to the bathroom.  Rationale: to maximize safety and independence with self cares  Goal Progress: Initial  Target Date: 03/17/24  PT Goal 4  Goal Identifier: Stairs  Goal Description: In 60 days, pt will be able to walk up at least 1 flight of stairs with <2/10 LBP and no R lateral hip pain for improved community & functional mobility.  Goal Progress: Initial  Target Date: 03/17/24      Frequency of Treatment: 1x/week  Duration of Treatment: 60 days    Recommended Referrals to Other Professionals:   Education Assessment:   Learner/Method: Patient    Risks and benefits of evaluation/treatment have been explained.   Patient/Family/caregiver agrees with Plan of Care.     Evaluation Time:      PT Andrei, Low Complexity Minutes (27797): 24       Signing Clinician: Halie Gan PT

## 2024-01-18 ENCOUNTER — HOSPITAL ENCOUNTER (OUTPATIENT)
Dept: MAMMOGRAPHY | Facility: CLINIC | Age: 64
Discharge: HOME | End: 2024-01-18
Attending: FAMILY MEDICINE | Admitting: FAMILY MEDICINE
Payer: COMMERCIAL

## 2024-01-18 DIAGNOSIS — Z12.31 VISIT FOR SCREENING MAMMOGRAM: ICD-10-CM

## 2024-01-18 PROCEDURE — 77067 SCR MAMMO BI INCL CAD: CPT

## 2024-01-24 DIAGNOSIS — M54.16 LUMBAR RADICULOPATHY: ICD-10-CM

## 2024-01-24 DIAGNOSIS — M25.562 BILATERAL CHRONIC KNEE PAIN: ICD-10-CM

## 2024-01-24 DIAGNOSIS — M25.561 BILATERAL CHRONIC KNEE PAIN: ICD-10-CM

## 2024-01-24 DIAGNOSIS — M25.851 HIP IMPINGEMENT SYNDROME, RIGHT: ICD-10-CM

## 2024-01-24 DIAGNOSIS — G89.29 BILATERAL CHRONIC KNEE PAIN: ICD-10-CM

## 2024-01-26 ENCOUNTER — THERAPY VISIT (OUTPATIENT)
Dept: PHYSICAL THERAPY | Facility: REHABILITATION | Age: 64
End: 2024-01-26
Payer: COMMERCIAL

## 2024-01-26 DIAGNOSIS — M25.561 BILATERAL CHRONIC KNEE PAIN: ICD-10-CM

## 2024-01-26 DIAGNOSIS — R20.9 SENSATION OF COLD IN LOWER EXTREMITY: ICD-10-CM

## 2024-01-26 DIAGNOSIS — G89.29 CHRONIC MIDLINE LOW BACK PAIN WITHOUT SCIATICA: ICD-10-CM

## 2024-01-26 DIAGNOSIS — M25.851 HIP IMPINGEMENT SYNDROME, RIGHT: ICD-10-CM

## 2024-01-26 DIAGNOSIS — M25.562 BILATERAL CHRONIC KNEE PAIN: ICD-10-CM

## 2024-01-26 DIAGNOSIS — M54.16 LUMBAR RADICULOPATHY: Primary | ICD-10-CM

## 2024-01-26 DIAGNOSIS — G89.29 BILATERAL CHRONIC KNEE PAIN: ICD-10-CM

## 2024-01-26 DIAGNOSIS — M54.50 CHRONIC MIDLINE LOW BACK PAIN WITHOUT SCIATICA: ICD-10-CM

## 2024-01-26 PROCEDURE — 97110 THERAPEUTIC EXERCISES: CPT | Mod: GP | Performed by: PHYSICAL THERAPIST

## 2024-01-26 PROCEDURE — 97140 MANUAL THERAPY 1/> REGIONS: CPT | Mod: GP | Performed by: PHYSICAL THERAPIST

## 2024-02-01 DIAGNOSIS — K75.4 AUTOIMMUNE HEPATITIS (H): Primary | ICD-10-CM

## 2024-02-02 DIAGNOSIS — M25.561 BILATERAL CHRONIC KNEE PAIN: ICD-10-CM

## 2024-02-02 DIAGNOSIS — M25.562 BILATERAL CHRONIC KNEE PAIN: ICD-10-CM

## 2024-02-02 DIAGNOSIS — G89.29 BILATERAL CHRONIC KNEE PAIN: ICD-10-CM

## 2024-02-02 DIAGNOSIS — M54.16 LUMBAR RADICULOPATHY: ICD-10-CM

## 2024-02-02 DIAGNOSIS — M25.851 HIP IMPINGEMENT SYNDROME, RIGHT: ICD-10-CM

## 2024-02-08 ENCOUNTER — OFFICE VISIT (OUTPATIENT)
Dept: GASTROENTEROLOGY | Facility: CLINIC | Age: 64
End: 2024-02-08
Attending: FAMILY MEDICINE
Payer: COMMERCIAL

## 2024-02-08 ENCOUNTER — LAB (OUTPATIENT)
Dept: LAB | Facility: CLINIC | Age: 64
End: 2024-02-08
Payer: COMMERCIAL

## 2024-02-08 ENCOUNTER — TRANSFERRED RECORDS (OUTPATIENT)
Dept: GASTROENTEROLOGY | Facility: CLINIC | Age: 64
End: 2024-02-08

## 2024-02-08 VITALS
OXYGEN SATURATION: 99 % | DIASTOLIC BLOOD PRESSURE: 84 MMHG | SYSTOLIC BLOOD PRESSURE: 134 MMHG | WEIGHT: 242.1 LBS | HEART RATE: 68 BPM | BODY MASS INDEX: 41.56 KG/M2

## 2024-02-08 DIAGNOSIS — Z12.11 COLON CANCER SCREENING: Primary | ICD-10-CM

## 2024-02-08 DIAGNOSIS — K75.4 AUTOIMMUNE HEPATITIS (H): ICD-10-CM

## 2024-02-08 LAB
ALBUMIN SERPL BCG-MCNC: 4 G/DL (ref 3.5–5.2)
ALP SERPL-CCNC: 71 U/L (ref 40–150)
ALT SERPL W P-5'-P-CCNC: 6 U/L (ref 0–50)
ANION GAP SERPL CALCULATED.3IONS-SCNC: 10 MMOL/L (ref 7–15)
AST SERPL W P-5'-P-CCNC: 15 U/L (ref 0–45)
BILIRUB DIRECT SERPL-MCNC: <0.2 MG/DL (ref 0–0.3)
BILIRUB SERPL-MCNC: 0.5 MG/DL
BUN SERPL-MCNC: 9 MG/DL (ref 8–23)
CALCIUM SERPL-MCNC: 9.2 MG/DL (ref 8.8–10.2)
CHLORIDE SERPL-SCNC: 106 MMOL/L (ref 98–107)
CREAT SERPL-MCNC: 0.73 MG/DL (ref 0.51–0.95)
DEPRECATED HCO3 PLAS-SCNC: 25 MMOL/L (ref 22–29)
EGFRCR SERPLBLD CKD-EPI 2021: >90 ML/MIN/1.73M2
ERYTHROCYTE [DISTWIDTH] IN BLOOD BY AUTOMATED COUNT: 14.9 % (ref 10–15)
GLUCOSE SERPL-MCNC: 90 MG/DL (ref 70–99)
HCT VFR BLD AUTO: 36.8 % (ref 35–47)
HGB BLD-MCNC: 11.4 G/DL (ref 11.7–15.7)
IGG SERPL-MCNC: 1613 MG/DL (ref 610–1616)
INR PPP: 1.01 (ref 0.85–1.15)
MCH RBC QN AUTO: 26.6 PG (ref 26.5–33)
MCHC RBC AUTO-ENTMCNC: 31 G/DL (ref 31.5–36.5)
MCV RBC AUTO: 86 FL (ref 78–100)
PLATELET # BLD AUTO: 203 10E3/UL (ref 150–450)
POTASSIUM SERPL-SCNC: 3.9 MMOL/L (ref 3.4–5.3)
PROT SERPL-MCNC: 7.5 G/DL (ref 6.4–8.3)
RBC # BLD AUTO: 4.29 10E6/UL (ref 3.8–5.2)
SODIUM SERPL-SCNC: 141 MMOL/L (ref 135–145)
WBC # BLD AUTO: 5.6 10E3/UL (ref 4–11)

## 2024-02-08 PROCEDURE — 91200 LIVER ELASTOGRAPHY: CPT | Mod: 26 | Performed by: PHYSICIAN ASSISTANT

## 2024-02-08 PROCEDURE — 85027 COMPLETE CBC AUTOMATED: CPT

## 2024-02-08 PROCEDURE — 36415 COLL VENOUS BLD VENIPUNCTURE: CPT

## 2024-02-08 PROCEDURE — 82784 ASSAY IGA/IGD/IGG/IGM EACH: CPT

## 2024-02-08 PROCEDURE — 82248 BILIRUBIN DIRECT: CPT

## 2024-02-08 PROCEDURE — 80053 COMPREHEN METABOLIC PANEL: CPT

## 2024-02-08 PROCEDURE — 99214 OFFICE O/P EST MOD 30 MIN: CPT | Performed by: PHYSICIAN ASSISTANT

## 2024-02-08 PROCEDURE — 85610 PROTHROMBIN TIME: CPT

## 2024-02-08 ASSESSMENT — PAIN SCALES - GENERAL: PAINLEVEL: NO PAIN (0)

## 2024-02-08 NOTE — PROGRESS NOTES
Hepatology Follow-up Clinic note  Roxie Vargas   Date of Birth 1960      Reason for follow-up: history of AIH          Assessment/plan:   Roxie Vargas is a 63 year old female with female with history of autoimmune hepatitis, who was weaned off of azathioprine in 2021.  Prior to that she had been on 25 mg of azathioprine for 6 months.  Her transaminases have remained low normal. Last IgG mildly elevated around 1679, pending today.  Last fibrosis scan in 2018 showing stage 0-1, 5.8 kPa.      # History of autoimmune hepatitis, in remission without medication  - Her transaminases remain normal and her liver function is also normal.   - IgG and hepatic panel pending today  - Recommend on-going monitoring of IgG and hepatic panel every 6 months.  - Will get updated FibroScan to ensure no fibrosis advancement in recent years, which will also help ongoing plan for monitoring labs and ongoing need for treatment    # Aim for slow gradual weight loss    # Colon cancer screening, last completed 2013:  - Colonoscopy in future    # Follow-up in clinic in 1-2 years as needed    Ridge Sanchez PA-C   [facility]    Total time for E/M services performed on the date of the encounter 30 minutes.  This included review of previous: clinic visits, hospital records, lab results, imaging studies, and procedural documentation. Time also includes patient visit, documentation and discussion with other providers.  The findings from this review are summarized in the above note.     -----------------------------------------------------       HPI:   Roxie Vargas is a 63 year old female presenting for follow-up.     Autoimmune Hepatitis   -Diagnosed: 2015  -Prior biopsy: 2106, bridging fibrosis   Fibrosis Scan: 2018, F0-F1 , 5.8 kilopascals   -Prior treatments:   - Prednisone for induction at diagnosis   - Azothioprine since   Previous IG on 2016     Patient was  last seen by me September 9, 2022.  No recent hospitalizations or ER visits.  She denies any new medications.  Was recently restarted on iron supplement.     Appetite is good. Working on reducing portions again. Weight is up over the past year.  States she is less active.  Continues to have good energy level.    Patient denies jaundice, lower extremity edema, abdominal distension or confusion.      Patient also denies melena, hematochezia or hematemesis.    Patient denies weight loss, fevers, sweats or chills.    Working full-time from home    Previous work-up:   Lab Results   Component Value Date    HEPBANG Nonreactive 01/04/2016    HBCAB Nonreactive 01/05/2016    HCVAB Negative 02/18/2021     12/29/2022    IRONSAT 13 (L) 12/29/2022    CER 46 01/04/2016    A1A 174 01/05/2016    TSH 2.10 11/10/2015    CHOL 194 10/19/2023    HDL 53 10/19/2023     (H) 10/19/2023    TRIG 75 10/19/2023    A1C 5.5 10/19/2023      Recent Labs   Lab Test 10/19/23  1620 12/29/22  1621 09/01/22  0724 05/12/22  1252 11/16/21  0954 07/15/21  0708 06/26/20  0959 06/05/19  1213 12/05/18  1213 06/05/18  1209   ALKPHOS 69 79 65 74 80 84 81 79 85 82   ALT 9 7* <9 <9 <9 10 16 14 14 10   AST 17 21 10 12 12 12 9 12 10 8   BILITOTAL 0.3 0.4 0.6 0.4 0.5 0.7 0.4 0.3 0.3 0.3        Medical hx Surgical hx   Past Medical History:   Diagnosis Date    Autoimmune hepatitis (H)     Fibroids     Gastroesophageal reflux disease with esophagitis without hemorrhage 2/3/2021    Heartburn     Iron deficiency anemia     Morbid obesity (H) 10/24/2019    Pain in thumb joint with movement of left hand 1/2/2017    Radial styloid tenosynovitis 1/2/2017    Ulcer     Past Surgical History:   Procedure Laterality Date    COLONOSCOPY  4/1/2013    Procedure: COLONOSCOPY;;  Surgeon: Tobias Merchant MD;  Location: [facility]    dilation & curretage        uterine fibroids removed[  2005    wisdom teeth[                   Medications:     Current Outpatient Medications    Medication    ascorbic acid (VITAMIN C) 1000 MG TABS    calcium citrate and vitamin D (CITRACAL) 200-250 MG-UNIT TABS per tablet    carboxymethylcellulose (CARBOXYMETHYLCELLULOSE SODIUM) 0.5 % SOLN ophthalmic solution    Ferrous Sulfate (IRON CR PO)    ibuprofen (ADVIL,MOTRIN) 600 MG tablet    multivitamin (CENTRUM SILVER) tablet    olopatadine (PATADAY) 0.2 % ophthalmic solution    omeprazole (PRILOSEC) 20 MG DR capsule    tiZANidine (ZANAFLEX) 4 MG tablet     No current facility-administered medications for this visit.            Allergies:     Allergies   Allergen Reactions    Shrimp Anaphylaxis    Aspirin Other (See Comments)     Watery eyes. Happened a long time ago.             Review of Systems:   10 points ROS was obtained and highlighted in the HPI, otherwise negative.          Physical Exam:   /84   Pulse 68   Wt 109.8 kg (242 lb 1.6 oz)   SpO2 99%   BMI 41.56 kg/m      Gen- well, NAD, A+Ox3, normal color  Lym- no palpable LAD  Abd- soft, nontender   Extr- hands normal, no LINDA  Skin- no rash or jaundice  Neuro- no asterixis  Psych- normal mood           Data:   Reviewed in person and significant for:    Lab Results   Component Value Date     09/01/2022     06/26/2020      Lab Results   Component Value Date    POTASSIUM 3.7 09/01/2022    POTASSIUM 3.5 06/26/2020     Lab Results   Component Value Date    CHLORIDE 108 09/01/2022    CHLORIDE 107 06/26/2020     Lab Results   Component Value Date    CO2 27 09/01/2022    CO2 26 06/26/2020     Lab Results   Component Value Date    BUN 6 09/01/2022    BUN 9 06/26/2020     Lab Results   Component Value Date    CR 0.68 09/01/2022    CR 0.67 06/26/2020       Lab Results   Component Value Date    WBC 6.4 10/19/2023    WBC 6.5 06/26/2020     Lab Results   Component Value Date    HGB 10.9 10/19/2023    HGB 11.4 06/26/2020     Lab Results   Component Value Date    HCT 34.3 10/19/2023    HCT 37.1 06/26/2020     Lab Results   Component Value Date     "MCV 85 10/19/2023    MCV 87 06/26/2020     Lab Results   Component Value Date     10/19/2023     06/26/2020       Lab Results   Component Value Date    AST 17 10/19/2023    AST 9 06/26/2020     Lab Results   Component Value Date    ALT 9 10/19/2023    ALT 16 06/26/2020     No results found for: \"BILICONJ\"   Lab Results   Component Value Date    BILITOTAL 0.3 10/19/2023    BILITOTAL 0.4 06/26/2020       Lab Results   Component Value Date    ALBUMIN 4.3 10/19/2023    ALBUMIN 3.7 09/01/2022    ALBUMIN 3.6 06/26/2020     Lab Results   Component Value Date    PROTTOTAL 7.6 10/19/2023    PROTTOTAL 8.1 06/26/2020      Lab Results   Component Value Date    ALKPHOS 69 10/19/2023    ALKPHOS 81 06/26/2020       Lab Results   Component Value Date    INR 1.00 09/01/2022    INR 1.05 06/26/2020     EXAM: US ABDOMEN LIMITED  LOCATION: [facility]  DATE/TIME: 2/18/2021 7:07 AM     INDICATION: autoimmune hepatitis / c/o RUQ pain  COMPARISON: None.  TECHNIQUE: Limited abdominal ultrasound.     FINDINGS:     GALLBLADDER: Normal. No gallstones, wall thickening, or pericholecystic fluid. Negative sonographic Ramires's sign.     BILE DUCTS: No biliary dilatation. The common duct measures 2 mm.     LIVER: Normal parenchyma with smooth contour. No focal mass.     RIGHT KIDNEY: No hydronephrosis.     PANCREAS: The visualized portions are normal.     No ascites.     IMPRESSION:     Normal limited abdominal ultrasound.  "

## 2024-02-08 NOTE — LETTER
2/8/2024         RE: Roxie Vargas  [id number] [facility] [address]        Dear Colleague,    Thank you for referring your patient, Roxie Vargas, to the [facility] [place]. Please see a copy of my visit note below.    Hepatology Follow-up Clinic note  Roxie Vargas   Date of Birth 1960      Reason for follow-up: history of AIH          Assessment/plan:   Roxie Vargas is a 63 year old female with female with history of autoimmune hepatitis, who was weaned off of azathioprine in November 2021.  Prior to that she had been on 25 mg of azathioprine for 6 months.  Her transaminases have remained low normal. Last IgG mildly elevated around 1679, pending today.  Last fibrosis scan in 2018 showing stage 0-1, 5.8 kPa.      # History of autoimmune hepatitis, in remission without medication  - Her transaminases remain normal and her liver function is also normal.   - IgG and hepatic panel pending today  - Recommend on-going monitoring of IgG and hepatic panel every 6 months.  - Will get updated FibroScan to ensure no fibrosis advancement in recent years, which will also help ongoing plan for monitoring labs and ongoing need for treatment    # Aim for slow gradual weight loss    # Colon cancer screening, last completed 2013:  - Colonoscopy in future    # Follow-up in clinic in 1-2 years as needed    Ridge Sanchez PA-C   [facility]    Total time for E/M services performed on the date of the encounter 30 minutes.  This included review of previous: clinic visits, hospital records, lab results, imaging studies, and procedural documentation. Time also includes patient visit, documentation and discussion with other providers.  The findings from this review are summarized in the above note.     -----------------------------------------------------       HPI:   Roxie Vargas is a 63 year old female presenting for follow-up.     Autoimmune  Hepatitis   -Diagnosed: 2015  -Prior biopsy: 2106, bridging fibrosis   Fibrosis Scan: 2018, F0-F1 , 5.8 kilopascals   -Prior treatments:   - Prednisone for induction at diagnosis   - Azothioprine since 2016  Previous IG on 2016     Patient was last seen by me 2022.  No recent hospitalizations or ER visits.  She denies any new medications.  Was recently restarted on iron supplement.     Appetite is good. Working on reducing portions again. Weight is up over the past year.  States she is less active.  Continues to have good energy level.    Patient denies jaundice, lower extremity edema, abdominal distension or confusion.      Patient also denies melena, hematochezia or hematemesis.    Patient denies weight loss, fevers, sweats or chills.    Working full-time from home    Previous work-up:   Lab Results   Component Value Date    HEPBANG Nonreactive 2016    HBCAB Nonreactive 2016    HCVAB Negative 2021     2022    IRONSAT 13 (L) 2022    CER 46 2016    A1A 174 2016    TSH 2.10 11/10/2015    CHOL 194 10/19/2023    HDL 53 10/19/2023     (H) 10/19/2023    TRIG 75 10/19/2023    A1C 5.5 10/19/2023      Recent Labs   Lab Test 10/19/23  1620 22  1621 22  0724 22  1252 21  0954 07/15/21  0708 20  0959 19  1213 18  1213 18  1209   ALKPHOS 69 79 65 74 80 84 81 79 85 82   ALT 9 7* <9 <9 <9 10 16 14 14 10   AST 17 21 10 12 12 12 9 12 10 8   BILITOTAL 0.3 0.4 0.6 0.4 0.5 0.7 0.4 0.3 0.3 0.3        Medical hx Surgical hx   Past Medical History:   Diagnosis Date     Autoimmune hepatitis (H)      Fibroids      Gastroesophageal reflux disease with esophagitis without hemorrhage 2/3/2021     Heartburn      Iron deficiency anemia      Morbid obesity (H) 10/24/2019     Pain in thumb joint with movement of left hand 2017     Radial styloid tenosynovitis 2017     Ulcer     Past Surgical  History:   Procedure Laterality Date     COLONOSCOPY  4/1/2013    Procedure: COLONOSCOPY;;  Surgeon: Tobias Merchant MD;  [facility]     dilation & curretage         uterine fibroids removed[  2005     wisdom teeth[                   Medications:     Current Outpatient Medications   Medication     ascorbic acid (VITAMIN C) 1000 MG TABS     calcium citrate and vitamin D (CITRACAL) 200-250 MG-UNIT TABS per tablet     carboxymethylcellulose (CARBOXYMETHYLCELLULOSE SODIUM) 0.5 % SOLN ophthalmic solution     Ferrous Sulfate (IRON CR PO)     ibuprofen (ADVIL,MOTRIN) 600 MG tablet     multivitamin (CENTRUM SILVER) tablet     olopatadine (PATADAY) 0.2 % ophthalmic solution     omeprazole (PRILOSEC) 20 MG DR capsule     tiZANidine (ZANAFLEX) 4 MG tablet     No current facility-administered medications for this visit.            Allergies:     Allergies   Allergen Reactions     Shrimp Anaphylaxis     Aspirin Other (See Comments)     Watery eyes. Happened a long time ago.             Review of Systems:   10 points ROS was obtained and highlighted in the HPI, otherwise negative.          Physical Exam:   /84   Pulse 68   Wt 109.8 kg (242 lb 1.6 oz)   SpO2 99%   BMI 41.56 kg/m      Gen- well, NAD, A+Ox3, normal color  Lym- no palpable LAD  Abd- soft, nontender   Extr- hands normal, no LINDA  Skin- no rash or jaundice  Neuro- no asterixis  Psych- normal mood           Data:   Reviewed in person and significant for:    Lab Results   Component Value Date     09/01/2022     06/26/2020      Lab Results   Component Value Date    POTASSIUM 3.7 09/01/2022    POTASSIUM 3.5 06/26/2020     Lab Results   Component Value Date    CHLORIDE 108 09/01/2022    CHLORIDE 107 06/26/2020     Lab Results   Component Value Date    CO2 27 09/01/2022    CO2 26 06/26/2020     Lab Results   Component Value Date    BUN 6 09/01/2022    BUN 9 06/26/2020     Lab Results   Component Value Date    CR 0.68 09/01/2022    CR 0.67 06/26/2020  "      Lab Results   Component Value Date    WBC 6.4 10/19/2023    WBC 6.5 06/26/2020     Lab Results   Component Value Date    HGB 10.9 10/19/2023    HGB 11.4 06/26/2020     Lab Results   Component Value Date    HCT 34.3 10/19/2023    HCT 37.1 06/26/2020     Lab Results   Component Value Date    MCV 85 10/19/2023    MCV 87 06/26/2020     Lab Results   Component Value Date     10/19/2023     06/26/2020       Lab Results   Component Value Date    AST 17 10/19/2023    AST 9 06/26/2020     Lab Results   Component Value Date    ALT 9 10/19/2023    ALT 16 06/26/2020     No results found for: \"BILICONJ\"   Lab Results   Component Value Date    BILITOTAL 0.3 10/19/2023    BILITOTAL 0.4 06/26/2020       Lab Results   Component Value Date    ALBUMIN 4.3 10/19/2023    ALBUMIN 3.7 09/01/2022    ALBUMIN 3.6 06/26/2020     Lab Results   Component Value Date    PROTTOTAL 7.6 10/19/2023    PROTTOTAL 8.1 06/26/2020      Lab Results   Component Value Date    ALKPHOS 69 10/19/2023    ALKPHOS 81 06/26/2020       Lab Results   Component Value Date    INR 1.00 09/01/2022    INR 1.05 06/26/2020     EXAM: US ABDOMEN LIMITED  LOCATION: [facility]  DATE/TIME: 2/18/2021 7:07 AM     INDICATION: autoimmune hepatitis / c/o RUQ pain  COMPARISON: None.  TECHNIQUE: Limited abdominal ultrasound.     FINDINGS:     GALLBLADDER: Normal. No gallstones, wall thickening, or pericholecystic fluid. Negative sonographic Ramires's sign.     BILE DUCTS: No biliary dilatation. The common duct measures 2 mm.     LIVER: Normal parenchyma with smooth contour. No focal mass.     RIGHT KIDNEY: No hydronephrosis.     PANCREAS: The visualized portions are normal.     No ascites.     IMPRESSION:     Normal limited abdominal ultrasound.      Again, thank you for allowing me to participate in the care of your patient.        Sincerely,        Ridge Sanchez PA-C  "

## 2024-02-09 ENCOUNTER — THERAPY VISIT (OUTPATIENT)
Dept: PHYSICAL THERAPY | Facility: REHABILITATION | Age: 64
End: 2024-02-09
Payer: COMMERCIAL

## 2024-02-09 DIAGNOSIS — M25.851 HIP IMPINGEMENT SYNDROME, RIGHT: ICD-10-CM

## 2024-02-09 DIAGNOSIS — M25.561 BILATERAL CHRONIC KNEE PAIN: ICD-10-CM

## 2024-02-09 DIAGNOSIS — M54.16 LUMBAR RADICULOPATHY: Primary | ICD-10-CM

## 2024-02-09 DIAGNOSIS — M25.562 BILATERAL CHRONIC KNEE PAIN: ICD-10-CM

## 2024-02-09 DIAGNOSIS — G89.29 BILATERAL CHRONIC KNEE PAIN: ICD-10-CM

## 2024-02-09 PROCEDURE — 97110 THERAPEUTIC EXERCISES: CPT | Mod: GP | Performed by: PHYSICAL THERAPIST

## 2024-02-09 PROCEDURE — 97140 MANUAL THERAPY 1/> REGIONS: CPT | Mod: GP | Performed by: PHYSICAL THERAPIST

## 2024-02-12 DIAGNOSIS — M25.851 HIP IMPINGEMENT SYNDROME, RIGHT: ICD-10-CM

## 2024-02-12 DIAGNOSIS — M25.561 BILATERAL CHRONIC KNEE PAIN: ICD-10-CM

## 2024-02-12 DIAGNOSIS — M25.562 BILATERAL CHRONIC KNEE PAIN: ICD-10-CM

## 2024-02-12 DIAGNOSIS — G89.29 BILATERAL CHRONIC KNEE PAIN: ICD-10-CM

## 2024-02-12 DIAGNOSIS — M54.16 LUMBAR RADICULOPATHY: ICD-10-CM

## 2024-02-21 ENCOUNTER — THERAPY VISIT (OUTPATIENT)
Dept: PHYSICAL THERAPY | Facility: REHABILITATION | Age: 64
End: 2024-02-21
Payer: COMMERCIAL

## 2024-02-21 DIAGNOSIS — G89.29 BILATERAL CHRONIC KNEE PAIN: ICD-10-CM

## 2024-02-21 DIAGNOSIS — M25.562 BILATERAL CHRONIC KNEE PAIN: ICD-10-CM

## 2024-02-21 DIAGNOSIS — M54.16 LUMBAR RADICULOPATHY: Primary | ICD-10-CM

## 2024-02-21 DIAGNOSIS — M25.851 HIP IMPINGEMENT SYNDROME, RIGHT: ICD-10-CM

## 2024-02-21 DIAGNOSIS — M25.561 BILATERAL CHRONIC KNEE PAIN: ICD-10-CM

## 2024-02-21 DIAGNOSIS — M54.16 LUMBAR RADICULOPATHY: ICD-10-CM

## 2024-02-21 PROCEDURE — 97110 THERAPEUTIC EXERCISES: CPT | Mod: GP | Performed by: PHYSICAL THERAPIST

## 2024-02-28 DIAGNOSIS — M25.561 BILATERAL CHRONIC KNEE PAIN: ICD-10-CM

## 2024-02-28 DIAGNOSIS — M54.16 LUMBAR RADICULOPATHY: ICD-10-CM

## 2024-02-28 DIAGNOSIS — G89.29 BILATERAL CHRONIC KNEE PAIN: ICD-10-CM

## 2024-02-28 DIAGNOSIS — M25.562 BILATERAL CHRONIC KNEE PAIN: ICD-10-CM

## 2024-02-28 DIAGNOSIS — M25.851 HIP IMPINGEMENT SYNDROME, RIGHT: ICD-10-CM

## 2024-02-29 NOTE — TELEPHONE ENCOUNTER
S-(situation):   Writer called pt for an update on tizanidine.     B-(background):   Initially prescribed 01/09/2024.  Tizanidine 4 mg tablet  Take 1 tablet by mouth three times daily    A-(assessment):   Pt reports they picked up the initial script, however, they have not taken any. Stated it should be PRN, not scheduled.     Pt is doing physical therapy, for their low back pain. Improving.     Pt has not participated in [employer] classes per PCP recommendation.    R-(recommendations):   Pt does not want refill of medication.     Fadia Capone RN

## 2024-03-01 ENCOUNTER — THERAPY VISIT (OUTPATIENT)
Dept: PHYSICAL THERAPY | Facility: REHABILITATION | Age: 64
End: 2024-03-01
Payer: COMMERCIAL

## 2024-03-01 DIAGNOSIS — G89.29 BILATERAL CHRONIC KNEE PAIN: ICD-10-CM

## 2024-03-01 DIAGNOSIS — M25.562 BILATERAL CHRONIC KNEE PAIN: ICD-10-CM

## 2024-03-01 DIAGNOSIS — M25.561 BILATERAL CHRONIC KNEE PAIN: ICD-10-CM

## 2024-03-01 DIAGNOSIS — M25.851 HIP IMPINGEMENT SYNDROME, RIGHT: ICD-10-CM

## 2024-03-01 DIAGNOSIS — M54.16 LUMBAR RADICULOPATHY: Primary | ICD-10-CM

## 2024-03-01 PROCEDURE — 97110 THERAPEUTIC EXERCISES: CPT | Mod: GP | Performed by: PHYSICAL THERAPIST

## 2024-03-01 NOTE — PROGRESS NOTES
DISCHARGE  Reason for Discharge: Pt canceled remaining PT visits.     Equipment Issued: none    Discharge Plan: Patient to continue home program.    Referring Provider:  Camille Chopra        PLAN  Continue therapy per current plan of care.    Beginning/End Dates of Progress Note Reporting Period:  01/17/24 to 03/01/2024    Referring Provider:  Camille Chopra       03/01/24 0500   Appointment Info   Signing clinician's name / credentials Halie Gan, PT   Visits Used 5   Medical Diagnosis Hip impingement syndrome, right, Lumbar radiculopathy, Bilateral chronic knee pain   PT Tx Diagnosis Hip impingement syndrome, right, Lumbar radiculopathy, Bilateral chronic knee pain   Progress Note/Certification   Onset of illness/injury or Date of Surgery 01/03/24  (About 2 weeks ago pain started worsening which prompted pt to seek additional care from doctor & get referral to PT)   Therapy Frequency 1x/week   Predicted Duration 60 days   Progress Note Due Date 02/16/24   Progress Note Completed Date 01/17/24   GOALS   PT Goals 2;3;4   PT Goal 1   Goal Identifier HEP   Goal Description In 30 days, pt will demonstrate understanding of and independence with their HEP.   Goal Progress Initial   Target Date 02/16/24   Date Met 03/01/24   PT Goal 2   Goal Identifier Sitting   Goal Description In 60 days, pt will be able to sit for at least 1 hour with no LBP or R hip pain in order to carry out job duties.   Goal Progress 2/10 with prolonged sitting   Target Date 03/17/24   PT Goal 3   Goal Identifier Waking up   Goal Description In 60 days, pt will report <2/10 LBP (notably R side) upon waking up during the night or in the morning to go to the bathroom.   Rationale to maximize safety and independence with self cares   Goal Progress 1/10 hip pain, 3/10 back pain   Target Date 03/17/24   PT Goal 4   Goal Identifier Stairs   Goal Description In 60 days, pt will be able to walk up at least 1 flight of stairs with <2/10 LBP and no R lateral  hip pain for improved community & functional mobility.   Goal Progress Continued knee pain with stairs, but no hip pain   Target Date 03/17/24   Subjective Report   Subjective Report Roxie notes she is having less pain overall. Her right glute doesn't hurt as much and pain when she wakes up at night or in the morning is less. Still has low back pain, but this has decreased as well.   Treatment Interventions (PT)   Interventions Therapeutic Procedure/Exercise;Neuromuscular Re-education;Manual Therapy   Therapeutic Procedure/Exercise   Therapeutic Procedures: strength, endurance, ROM, flexibillity minutes (09993) 40   Therapeutic Procedures Ther Proc 2;Ther Proc 3;Ther Proc 4;Ther Proc 5;Ther Proc 6;Ther Proc 7;Ther Proc 8   Ther Proc 1 Recumbent bike: level 2 x 5 minutes   Ther Proc 1 - Details LTR: verbal review for HEP   Ther Proc 2 Supine piriformis stretch hips >90flex: not today   Ther Proc 2 - Details Clamshells: not today   Ther Proc 3 Bridge with ab set: 1 x15, 3-5 sec holds, 1 x10 with 10 lb med ball and 3-5 sec holds   Ther Proc 3 - Details Sidelying hip abduction: B 2 x10   Ther Proc 4 Hooklying knee extensions with ab set: B x10, 5 sec holds   Ther Proc 4 - Details Ab set + heel taps (legs suspended): B 2 x6, challenging   Ther Proc 5 Cat/cow: x12, heavy verbal and tactile cueing   Ther Proc 5 - Details Seated hamstring stretch: not today   Ther Proc 6 Standing hip abd: not today   Ther Proc 6 - Details Sit to stands: x15 --> Chair taps: incr knee pain   Ther Proc 7 Child's pose: trialed, incr knee pain aashish   Ther Proc 7 - Details Standing hip flexor stretch: B x30 sec, mildly incr right low back/ hip pain   Skilled Intervention Reviewed and progressed HEP, demonstration, cueing, exercises to improve core and glute strength   Patient Response/Progress good, no pain   Ther Proc 8 LAQ: B x12, alternating   Neuromuscular Re-education   Neuromuscular Re-education Neuro Re-ed 2   Neuro Re-ed 1 Supine TA sets:  Not today   Neuro Re-ed 1 - Details Edu on anatomy of TA & it's role in helping support our lower back when performing various movements/ exercises   Neuro Re-ed 2 Discussed sleeping position: placing pillow between knees and ankles to help mitigate pain when she gets out of bed   Skilled Intervention To improve core control & awareness   Patient Response/Progress Demonstrated understanding   Manual Therapy   Manual Therapy 1 STM   Manual Therapy 1 - Details STM to R glute med and proximal glute max, pt side lying   Skilled Intervention STM indicated to alleviate muscle pain and tightness   Patient Response/Progress good, pt reported feeling improvement in sx following treatment   Education   Learner/Method Patient   Plan   Home program see PTRx   Plan for next session Progress HEP, continue with core/ low back & glute/hip strengthening, consider repeating MT   Comments   Comments Assessment: Pt returns to PT for her fourth follow up in addressing LBP and right sided hip pain. Pt has been consistent with HEP completion. Overall Roxie has noted and demonstrated good improvement since SOC indicated by significantly decreased pain upon waking in the morning, no hip pain with stair climbing, and decreased pain with sitting for prolonged periods (at least 1 hour). She is also able to tolerate exercises better compared to SOC. Plan to see Roxie for a few more visits every 2 weeks and will progress exercises as tolerated. Roxie continues to benefit from skilled therapy.   Total Session Time   Timed Code Treatment Minutes 40   Total Treatment Time (sum of timed and untimed services) 40

## 2024-03-28 ENCOUNTER — TELEPHONE (OUTPATIENT)
Dept: GASTROENTEROLOGY | Facility: CLINIC | Age: 64
End: 2024-03-28
Payer: COMMERCIAL

## 2024-03-28 NOTE — TELEPHONE ENCOUNTER
"Endoscopy Scheduling Screen    Have you had a positive Covid test in the last 14 days?  No    What is your communication preference for Instructions and/or Bowel Prep?   MyChart    What insurance is in the chart?  Other:  R    Ordering/Referring Provider:     BARNEY ROMERO      (If ordering provider performs procedure, schedule with ordering provider unless otherwise instructed. )    BMI: Estimated body mass index is 41.56 kg/m  as calculated from the following:    Height as of 1/9/24: 1.626 m (5' 4\").    Weight as of 2/8/24: 109.8 kg (242 lb 1.6 oz).     Sedation Ordered  moderate sedation.   If patient BMI > 50 do not schedule in [facility].    If patient BMI > 45 do not schedule at [facility].    Are you taking methadone or Suboxone?  No    Have you had difficulties, pain, or discomfort during past endoscopy procedures?  No    Are you taking any prescription medications for pain 3 or more times per week?   NO, No RN review required.    Do you have a history of malignant hyperthermia?  No    (Females) Are you currently pregnant?   No     Have you been diagnosed or told you have pulmonary hypertension?   No    Do you have an LVAD?  No    Have you been told you have moderate to severe sleep apnea?  No    Have you been told you have COPD, asthma, or any other lung disease?  No    Do you have any heart conditions?  No     Have you ever had or are you waiting for an organ transplant?  No. Continue scheduling, no site restrictions.    Have you had a stroke or transient ischemic attack (TIA aka \"mini stroke\" in the last 6 months?   No    Have you been diagnosed with or been told you have cirrhosis of the liver?   No    Are you currently on dialysis?   No    Do you need assistance transferring?   No    BMI: Estimated body mass index is 41.56 kg/m  as calculated from the following:    Height as of 1/9/24: 1.626 m (5' 4\").    Weight as of 2/8/24: 109.8 kg (242 lb 1.6 oz).     Is patients BMI > 40 and scheduling location " [facility]?  No    Do you take an injectable medication for weight loss or diabetes (excluding insulin)?  No    Do you take the medication Naltrexone?  No    Do you take blood thinners?  No       Prep   Are you currently on dialysis or do you have chronic kidney disease?  No    Do you have a diagnosis of diabetes?  No    Do you have a diagnosis of cystic fibrosis (CF)?  No    On a regular basis do you go 3 -5 days between bowel movements?  No    BMI > 40?  No    Preferred Pharmacy:    [facility] #[id number] [place] [address] [address] [place] [address]  Phone: [phone] Fax: [phone]      Final Scheduling Details     Procedure scheduled  Colonoscopy    Surgeon:  Daniel     Date of procedure:  8/9     Pre-OP / PAC:   No - Not required for this site.    Location  [facility] - [facility] - Patient preference.    Sedation   Moderate Sedation - Per order.      Patient Reminders:   You will receive a call from a Nurse to review instructions and health history.  This assessment must be completed prior to your procedure.  Failure to complete the Nurse assessment may result in the procedure being cancelled.      On the day of your procedure, please designate an adult(s) who can drive you home stay with you for the next 24 hours. The medicines used in the exam will make you sleepy. You will not be able to drive.      You cannot take public transportation, ride share services, or non-medical taxi service without a responsible caregiver.  Medical transport services are allowed with the requirement that a responsible caregiver will receive you at your destination.  We require that drivers and caregivers are confirmed prior to your procedure.

## 2024-05-22 ENCOUNTER — TELEPHONE (OUTPATIENT)
Dept: GASTROENTEROLOGY | Facility: CLINIC | Age: 64
End: 2024-05-22
Payer: COMMERCIAL

## 2024-05-22 NOTE — TELEPHONE ENCOUNTER
Caller: Roxie Vargas   Reason for Reschedule/Cancellation (please be detailed, any staff messages or encounters to note?):     Per provider is OOO     Rescheduled: yes   Procedure: Lower Endoscopy [Colonoscopy]  Date: 10/02/2024  [place] [facility]; [address] [place] [address], [address], [place] [address]  Surgeon: Leventhal   Sedation Level Scheduled  moderate          Reason for Sedation Level per order   Prep/Instructions updated and sent: calvinhart     Does patient need PAC or Pre -Op Rescheduled? : n        Send In - basket message to Panc - Ron Pool if EUS procedure is canceled or rescheduled: [ N/A, YES or NO] n/a

## 2024-05-22 NOTE — TELEPHONE ENCOUNTER
Caller: LVM for Roxie    Reason for Reschedule/Cancellation   (please be detailed, any staff messages or encounters to note?): Provider out       Prior to reschedule please review:  Ordering Provider:     Ridge Sanchez PA-C in  GASTROENTEROLOGY     Sedation Determined: moderate  Does patient have any ASC Exclusions, please identify?: n      Notes on Cancelled Procedure:  Procedure: Lower Endoscopy [Colonoscopy]   Date: 08/09/2024  Location: [facility]; [address] [place] [address], [address], [place] [address]  Surgeon: Daniel      Rescheduled: No, CASE IN DEPOT        Did you cancel or rescheduled an EUS procedure? No.

## 2024-09-16 ENCOUNTER — TRANSFERRED RECORDS (OUTPATIENT)
Dept: HEALTH INFORMATION MANAGEMENT | Facility: CLINIC | Age: 64
End: 2024-09-16
Payer: COMMERCIAL

## 2024-09-23 ENCOUNTER — TELEPHONE (OUTPATIENT)
Dept: GASTROENTEROLOGY | Facility: CLINIC | Age: 64
End: 2024-09-23
Payer: COMMERCIAL

## 2024-09-23 DIAGNOSIS — Z12.11 COLON CANCER SCREENING: Primary | ICD-10-CM

## 2024-09-23 RX ORDER — BISACODYL 5 MG/1
TABLET, DELAYED RELEASE ORAL
Qty: 4 TABLET | Refills: 0 | Status: SHIPPED | OUTPATIENT
Start: 2024-09-23

## 2024-09-23 NOTE — TELEPHONE ENCOUNTER
Attempted to contact patient in order to complete pre assessment questions.     No answer. Left message to return call to [phone] option 2    Callback required communication sent via [employer].      Carmen Wallace RN  Endoscopy Procedure Pre Assessment

## 2024-09-23 NOTE — TELEPHONE ENCOUNTER
Pre visit planning completed.      Procedure details:    Patient scheduled for Colonoscopy on 10/2/24.     Arrival time: 0815. Procedure time 0915    Facility location: [facility]; [address] [place] [address], [address], [place] [address]. Check in location: 5th Floor.    Sedation type: Conscious sedation     Pre op exam needed? No.    Indication for procedure: Screening      Chart review:     Electronic implanted devices? No    Recent diagnosis of diverticulitis within the last 6 weeks? No      Medication review:    Diabetic? No    Anticoagulants? No    Weight loss medication/injectable? No GLP-1 medication per patient's medication list.  RN will verify with pre-assessment call.    Other medication HOLDING recommendations:  Ferrous Sulfate (iron supplement): HOLD 7 days before procedure.      Prep for procedure:     Bowel prep recommendation: Extended Golytely.     Bowel prep prescription sent to   [facility] #[id number] - [place], [place] - [address] [facility]  AT [facility]   Due to: BMI > 40.     Prep instructions sent via [employer]         Carmen Hart RN  Endoscopy Procedure Pre Assessment RN  [phone] option 2

## 2024-09-24 NOTE — TELEPHONE ENCOUNTER
Pre assessment completed for upcoming procedure.   (Please see previous telephone encounter notes for complete details)    Patient  returned call.       Procedure details:    Arrival time and facility location reviewed.    Pre op exam needed? No.    Designated  policy reviewed. Instructed to have someone stay 6  hours post procedure.       Medication review:    Medications reviewed. Please see supporting documentation below. Holding recommendations discussed (if applicable).   Ferrous Sulfate (iron supplement): HOLD 7 days before procedure.      Prep for procedure:     Procedure prep instructions reviewed.        Any additional information needed:  N/A      Patient  verbalized understanding and had no questions or concerns at this time.      Ivon Javed RN  Endoscopy Procedure Pre Assessment   [phone] option 2

## 2024-10-02 ENCOUNTER — HOSPITAL ENCOUNTER (OUTPATIENT)
Facility: AMBULATORY SURGERY CENTER | Age: 64
Discharge: HOME | End: 2024-10-02
Attending: INTERNAL MEDICINE | Admitting: INTERNAL MEDICINE
Payer: COMMERCIAL

## 2024-10-02 VITALS
HEIGHT: 64 IN | HEART RATE: 84 BPM | DIASTOLIC BLOOD PRESSURE: 73 MMHG | OXYGEN SATURATION: 96 % | BODY MASS INDEX: 40.97 KG/M2 | TEMPERATURE: 97 F | WEIGHT: 240 LBS | RESPIRATION RATE: 16 BRPM | SYSTOLIC BLOOD PRESSURE: 133 MMHG

## 2024-10-02 DIAGNOSIS — Z12.11 COLON CANCER SCREENING: Primary | ICD-10-CM

## 2024-10-02 LAB — COLONOSCOPY: NORMAL

## 2024-10-02 PROCEDURE — 45385 COLONOSCOPY W/LESION REMOVAL: CPT | Mod: 33 | Performed by: INTERNAL MEDICINE

## 2024-10-02 PROCEDURE — 88305 TISSUE EXAM BY PATHOLOGIST: CPT | Mod: TC | Performed by: INTERNAL MEDICINE

## 2024-10-02 PROCEDURE — 88305 TISSUE EXAM BY PATHOLOGIST: CPT | Mod: 26 | Performed by: STUDENT IN AN ORGANIZED HEALTH CARE EDUCATION/TRAINING PROGRAM

## 2024-10-02 PROCEDURE — 45380 COLONOSCOPY AND BIOPSY: CPT | Mod: 59,33 | Performed by: INTERNAL MEDICINE

## 2024-10-02 RX ORDER — NALOXONE HYDROCHLORIDE 0.4 MG/ML
0.4 INJECTION, SOLUTION INTRAMUSCULAR; INTRAVENOUS; SUBCUTANEOUS
Status: DISCONTINUED | OUTPATIENT
Start: 2024-10-02 | End: 2024-10-03 | Stop reason: HOSPADM

## 2024-10-02 RX ORDER — ONDANSETRON 4 MG/1
4 TABLET, ORALLY DISINTEGRATING ORAL EVERY 6 HOURS PRN
Status: DISCONTINUED | OUTPATIENT
Start: 2024-10-02 | End: 2024-10-03 | Stop reason: HOSPADM

## 2024-10-02 RX ORDER — NALOXONE HYDROCHLORIDE 0.4 MG/ML
0.2 INJECTION, SOLUTION INTRAMUSCULAR; INTRAVENOUS; SUBCUTANEOUS
Status: DISCONTINUED | OUTPATIENT
Start: 2024-10-02 | End: 2024-10-03 | Stop reason: HOSPADM

## 2024-10-02 RX ORDER — ONDANSETRON 2 MG/ML
4 INJECTION INTRAMUSCULAR; INTRAVENOUS EVERY 6 HOURS PRN
Status: DISCONTINUED | OUTPATIENT
Start: 2024-10-02 | End: 2024-10-03 | Stop reason: HOSPADM

## 2024-10-02 RX ORDER — LIDOCAINE 40 MG/G
CREAM TOPICAL
Status: DISCONTINUED | OUTPATIENT
Start: 2024-10-02 | End: 2024-10-02 | Stop reason: HOSPADM

## 2024-10-02 RX ORDER — FENTANYL CITRATE 50 UG/ML
INJECTION, SOLUTION INTRAMUSCULAR; INTRAVENOUS PRN
Status: DISCONTINUED | OUTPATIENT
Start: 2024-10-02 | End: 2024-10-02 | Stop reason: HOSPADM

## 2024-10-02 RX ORDER — PROCHLORPERAZINE MALEATE 10 MG
10 TABLET ORAL EVERY 6 HOURS PRN
Status: DISCONTINUED | OUTPATIENT
Start: 2024-10-02 | End: 2024-10-03 | Stop reason: HOSPADM

## 2024-10-02 RX ORDER — ONDANSETRON 2 MG/ML
4 INJECTION INTRAMUSCULAR; INTRAVENOUS
Status: DISCONTINUED | OUTPATIENT
Start: 2024-10-02 | End: 2024-10-02 | Stop reason: HOSPADM

## 2024-10-02 RX ORDER — FLUMAZENIL 0.1 MG/ML
0.2 INJECTION, SOLUTION INTRAVENOUS
Status: ACTIVE | OUTPATIENT
Start: 2024-10-02 | End: 2024-10-02

## 2024-10-02 NOTE — H&P
Roxie Vargas  [id number]  female  64 year old      Reason for procedure/surgery: colonoscopy    Patient Active Problem List   Diagnosis    Anemia    Myopia    Bilateral chronic knee pain    Autoimmune hepatitis (H)    Class 2 obesity without serious comorbidity with body mass index (BMI) of 38.0 to 38.9 in adult    Sensation of cold in lower extremity    Prediabetes    History of uterine fibroid    Cervical cancer screening       Past Surgical History:    Past Surgical History:   Procedure Laterality Date    COLONOSCOPY  4/1/2013    Procedure: COLONOSCOPY;;  Surgeon: Tobias Merchant MD;  Location: [facility]    dilation & curretage        uterine fibroids removed[  2005    wisdom teeth[         Past Medical History:   Past Medical History:   Diagnosis Date    Autoimmune hepatitis (H)     Fibroids     Gastroesophageal reflux disease with esophagitis without hemorrhage 2/3/2021    Heartburn     Iron deficiency anemia     Morbid obesity (H) 10/24/2019    Pain in thumb joint with movement of left hand 1/2/2017    Radial styloid tenosynovitis 1/2/2017    Ulcer        Social History:   Social History     Tobacco Use    Smoking status: Never     Passive exposure: Never    Smokeless tobacco: Never   Substance Use Topics    Alcohol use: Yes     Comment: 1 drink per month       Family History:   Family History   Problem Relation Age of Onset    Asthma Sister     Peptic Ulcer Disease Sister         and 2 nephews    Hypertension Mother        Allergies:   Allergies   Allergen Reactions    Shrimp Anaphylaxis    Aspirin Other (See Comments)     Watery eyes. Happened a long time ago.        Active Medications:   Current Outpatient Medications   Medication Sig Dispense Refill    bisacodyl (DULCOLAX) 5 MG EC tablet Two days prior to exam take two (2) tablets at 4pm. One day prior to exam take two (2) tablets at 4pm 4 tablet 0    carboxymethylcellulose (CARBOXYMETHYLCELLULOSE SODIUM) 0.5 % SOLN ophthalmic solution Place 1-2 drops into  "both eyes 3 times daily as needed for dry eyes 1 Bottle 3    olopatadine (PATADAY) 0.2 % ophthalmic solution Place 1 drop into both eyes daily 1 Bottle 11    polyethylene glycol (GOLYTELY) 236 g suspension Two days before procedure at 5PM fill first container with water. Mix and drink an 8 oz glass every 15 minutes until HALF of the container is gone. Place the remainder in the refrigerator. One day before procedure at 5PM drink second half of bowel prep. Drink an 8 oz glass every 15 minutes until it is gone. Day of procedure 6 hours before arrival time fill the 2nd container with water. Mix and drink an 8 oz glass every 15 minutes until HALF of the container is gone. Discard the remaining solution. 8000 mL 0    ascorbic acid (VITAMIN C) 1000 MG TABS Take 1,000 mg by mouth daily.      calcium citrate and vitamin D (CITRACAL) 200-250 MG-UNIT TABS per tablet Take 1 tablet by mouth 2 times daily      Ferrous Sulfate (IRON CR PO) Take 1 tablet by mouth daily.      ibuprofen (ADVIL,MOTRIN) 600 MG tablet Take 600 mg by mouth every 6 hours as needed.      multivitamin (CENTRUM SILVER) tablet Take 1 tablet by mouth daily      omeprazole (PRILOSEC) 20 MG DR capsule Take 1 capsule (20 mg) by mouth daily 60 capsule 1    tiZANidine (ZANAFLEX) 4 MG tablet TAKE 1 TABLET(4 MG) BY MOUTH THREE TIMES DAILY 30 tablet 0       Systemic Review:   CONSTITUTIONAL: NEGATIVE for fever, chills, change in weight  ENT/MOUTH: NEGATIVE for ear, mouth and throat problems  RESP: NEGATIVE for significant cough or SOB  CV: NEGATIVE for chest pain, palpitations or peripheral edema    Physical Examination:   Vital Signs: /72 (BP Location: Right arm)   Pulse 77   Temp 97.5  F (36.4  C) (Temporal)   Resp 18   Ht 1.626 m (5' 4\")   Wt 108.9 kg (240 lb)   SpO2 99%   BMI 41.20 kg/m    GENERAL: healthy, alert and no distress  RESP: Normal respiratory excursion   CV: regular rates and rhythm and no peripheral edema  ABDOMEN: soft, nontender and " bowel sounds normal  MS: no gross musculoskeletal defects noted, no edema    Plan: Appropriate to proceed as scheduled.      Thomas M. Leventhal, MD  10/2/2024    PCP:  Camille Chopra

## 2024-10-04 LAB
PATH REPORT.COMMENTS IMP SPEC: NORMAL
PATH REPORT.COMMENTS IMP SPEC: NORMAL
PATH REPORT.FINAL DX SPEC: NORMAL
PATH REPORT.GROSS SPEC: NORMAL
PATH REPORT.MICROSCOPIC SPEC OTHER STN: NORMAL
PATH REPORT.RELEVANT HX SPEC: NORMAL
PHOTO IMAGE: NORMAL

## 2024-10-16 ENCOUNTER — PATIENT OUTREACH (OUTPATIENT)
Dept: GASTROENTEROLOGY | Facility: CLINIC | Age: 64
End: 2024-10-16
Payer: COMMERCIAL

## 2024-11-07 ENCOUNTER — OFFICE VISIT (OUTPATIENT)
Dept: FAMILY MEDICINE | Facility: CLINIC | Age: 64
End: 2024-11-07
Attending: FAMILY MEDICINE
Payer: COMMERCIAL

## 2024-11-07 VITALS
RESPIRATION RATE: 16 BRPM | HEART RATE: 88 BPM | DIASTOLIC BLOOD PRESSURE: 72 MMHG | HEIGHT: 64 IN | TEMPERATURE: 98.2 F | BODY MASS INDEX: 41.42 KG/M2 | OXYGEN SATURATION: 100 % | WEIGHT: 242.6 LBS | SYSTOLIC BLOOD PRESSURE: 136 MMHG

## 2024-11-07 DIAGNOSIS — Z00.01 ENCOUNTER FOR ROUTINE ADULT MEDICAL EXAM WITH ABNORMAL FINDINGS: Primary | ICD-10-CM

## 2024-11-07 DIAGNOSIS — R73.03 PREDIABETES: ICD-10-CM

## 2024-11-07 DIAGNOSIS — J34.89 SINUS PAIN: ICD-10-CM

## 2024-11-07 DIAGNOSIS — K75.4 AUTOIMMUNE HEPATITIS (H): ICD-10-CM

## 2024-11-07 DIAGNOSIS — Z86.0100 S/P COLON POLYPECTOMY: ICD-10-CM

## 2024-11-07 DIAGNOSIS — E66.812 CLASS 2 OBESITY DUE TO EXCESS CALORIES WITHOUT SERIOUS COMORBIDITY WITH BODY MASS INDEX (BMI) OF 38.0 TO 38.9 IN ADULT: ICD-10-CM

## 2024-11-07 DIAGNOSIS — Z98.890 S/P COLON POLYPECTOMY: ICD-10-CM

## 2024-11-07 DIAGNOSIS — M25.562 BILATERAL CHRONIC KNEE PAIN: ICD-10-CM

## 2024-11-07 DIAGNOSIS — E66.09 CLASS 2 OBESITY DUE TO EXCESS CALORIES WITHOUT SERIOUS COMORBIDITY WITH BODY MASS INDEX (BMI) OF 38.0 TO 38.9 IN ADULT: ICD-10-CM

## 2024-11-07 DIAGNOSIS — M25.561 BILATERAL CHRONIC KNEE PAIN: ICD-10-CM

## 2024-11-07 DIAGNOSIS — G89.29 BILATERAL CHRONIC KNEE PAIN: ICD-10-CM

## 2024-11-07 LAB
EST. AVERAGE GLUCOSE BLD GHB EST-MCNC: 117 MG/DL
HBA1C MFR BLD: 5.7 % (ref 0–5.6)

## 2024-11-07 PROCEDURE — 36415 COLL VENOUS BLD VENIPUNCTURE: CPT | Performed by: FAMILY MEDICINE

## 2024-11-07 PROCEDURE — 82784 ASSAY IGA/IGD/IGG/IGM EACH: CPT | Performed by: FAMILY MEDICINE

## 2024-11-07 PROCEDURE — 80061 LIPID PANEL: CPT | Performed by: FAMILY MEDICINE

## 2024-11-07 PROCEDURE — 99213 OFFICE O/P EST LOW 20 MIN: CPT | Mod: 25 | Performed by: FAMILY MEDICINE

## 2024-11-07 PROCEDURE — 90673 RIV3 VACCINE NO PRESERV IM: CPT | Performed by: FAMILY MEDICINE

## 2024-11-07 PROCEDURE — 90471 IMMUNIZATION ADMIN: CPT | Performed by: FAMILY MEDICINE

## 2024-11-07 PROCEDURE — 83036 HEMOGLOBIN GLYCOSYLATED A1C: CPT | Performed by: FAMILY MEDICINE

## 2024-11-07 PROCEDURE — 80076 HEPATIC FUNCTION PANEL: CPT | Performed by: FAMILY MEDICINE

## 2024-11-07 PROCEDURE — 99396 PREV VISIT EST AGE 40-64: CPT | Mod: 25 | Performed by: FAMILY MEDICINE

## 2024-11-07 RX ORDER — FLUTICASONE PROPIONATE 50 MCG
1 SPRAY, SUSPENSION (ML) NASAL DAILY
Qty: 18.2 G | Refills: 1 | Status: SHIPPED | OUTPATIENT
Start: 2024-11-07

## 2024-11-07 RX ORDER — LORATADINE 10 MG/1
10 TABLET ORAL DAILY
Qty: 30 TABLET | Refills: 1 | Status: SHIPPED | OUTPATIENT
Start: 2024-11-07

## 2024-11-07 SDOH — HEALTH STABILITY: PHYSICAL HEALTH: ON AVERAGE, HOW MANY DAYS PER WEEK DO YOU ENGAGE IN MODERATE TO STRENUOUS EXERCISE (LIKE A BRISK WALK)?: 2 DAYS

## 2024-11-07 ASSESSMENT — SOCIAL DETERMINANTS OF HEALTH (SDOH): HOW OFTEN DO YOU GET TOGETHER WITH FRIENDS OR RELATIVES?: ONCE A WEEK

## 2024-11-07 NOTE — PROGRESS NOTES
"Preventive Care Visit  [facility] [place] JAMES Chopra MD, Family Medicine  Nov 7, 2024      Assessment & Plan     Roxie was seen today for physical.    Diagnoses and all orders for this visit:    Encounter for routine adult medical exam with abnormal findings    Autoimmune hepatitis (H)  Comments:  stable repeat lab in feb 2025  Orders:  -     Hepatic Panel every 3 mon  -     IgG    Bilateral chronic knee pain  Comments:  worked with PT doing better    Class 2 obesity due to excess calories without serious comorbidity with body mass index (BMI) of 38.0 to 38.9 in adult  -     Lipid Profile; Future  -     Lipid Profile    Prediabetes  -     Hemoglobin A1c; Future  -     Hemoglobin A1c    Sinus pain  Comments:  plan to use claritin + flonase as needed  Orders:  -     loratadine (CLARITIN) 10 MG tablet; Take 1 tablet (10 mg) by mouth daily.  -     fluticasone (FLONASE) 50 MCG/ACT nasal spray; Spray 1 spray into both nostrils daily.    S/P colon polypectomy  Comments:  had colonoscopy Oct 2024- plan repeat in 5 yr    Other orders  -     PRIMARY CARE FOLLOW-UP SCHEDULING; Future  -     Cancel: INFLUENZA VACCINE TRIVALENT(FLUBLOK); Future  -     COVID-19 12+ (PFIZER); Future  -     ZOSTER RECOMBINANT ADJUVANTED (SHINGRIX); Future  -     PRIMARY CARE FOLLOW-UP SCHEDULING  -     INFLUENZA VACCINE IM 18-64 YRS (FLUBLOK)         Patient has been advised of split billing requirements and indicates understanding: Yes        BMI  Estimated body mass index is 42.06 kg/m  as calculated from the following:    Height as of this encounter: 1.617 m (5' 3.68\").    Weight as of this encounter: 110 kg (242 lb 9.6 oz).       Counseling  Appropriate preventive services were addressed with this patient via screening, questionnaire, or discussion as appropriate for fall prevention, nutrition, physical activity, Tobacco-use cessation, social engagement, weight loss and cognition.  Checklist reviewing preventive services " available has been given to the patient.  Reviewed patient's diet, addressing concerns and/or questions.   She is at risk for lack of exercise and has been provided with information to increase physical activity for the benefit of her well-being.       MEDICATIONS:  Continue current medications without change    Sue Faustin is a 64 year old, presenting for the following:  Physical (Fasting - Concerns regarding pain behind eyes diagnosed as sinus issues by eye doctor. Would like to discuss colonoscopy results.)        11/7/2024     2:18 PM   Additional Questions   Roomed by Kristan HDEZ BMI 42:  Wt Readings from Last 4 Encounters:   11/07/24 110 kg (242 lb 9.6 oz)   10/02/24 108.9 kg (240 lb)   02/08/24 109.8 kg (242 lb 1.6 oz)   01/09/24 109.1 kg (240 lb 9.6 oz)      Frontal sinus area pain with pain behind the eye :  had eye exam 9/16/24  which showed no abnormal finding .  But during the clinic visit today patient usually squinting quite a lot when she looking at the screen or talking to me.  She does work on a computer whole day and use only over-the-counter cheaters  And usually 1-2 times per month.  Most of the pain lasted about half a day to 1 day she takes ibuprofen or Tylenol which seems to help the pain.   no history of high blood pressure.    Health Care Directive  Patient does not have a Health Care Directive: Discussed advance care planning with patient; information given to patient to review.      11/7/2024   General Health   How would you rate your overall physical health? Good   Feel stress (tense, anxious, or unable to sleep) Not at all            11/7/2024   Nutrition   Three or more servings of calcium each day? Yes   Diet: Regular (no restrictions)   How many servings of fruit and vegetables per day? (!) 2-3   How many sweetened beverages each day? 0-1            11/7/2024   Exercise   Days per week of moderate/strenous exercise 2 days      (!) EXERCISE CONCERN      11/7/2024    Social Factors   Frequency of gathering with friends or relatives Once a week   Worry food won't last until get money to buy more No   Food not last or not have enough money for food? No   Do you have housing? (Housing is defined as stable permanent housing and does not include staying ouside in a car, in a tent, in an abandoned building, in an overnight shelter, or couch-surfing.) Yes   Are you worried about losing your housing? No   Lack of transportation? No   Unable to get utilities (heat,electricity)? No            11/7/2024   Fall Risk   Fallen 2 or more times in the past year? No    Trouble with walking or balance? No        Patient-reported          11/7/2024   Dental   Dentist two times every year? Yes            11/7/2024   TB Screening   Were you born outside of the [place]? Yes              Today's PHQ-2 Score:       1/9/2024     8:25 AM   PHQ-2 ( 1999 [employer])   Q1: Little interest or pleasure in doing things 0    Q2: Feeling down, depressed or hopeless 0    PHQ-2 Score 0   Q1: Little interest or pleasure in doing things Not at all   Q2: Feeling down, depressed or hopeless Not at all   PHQ-2 Score 0       Patient-reported         11/7/2024   Substance Use   Alcohol more than 3/day or more than 7/wk No   Do you use any other substances recreationally? No        Social History     Tobacco Use    Smoking status: Never     Passive exposure: Never    Smokeless tobacco: Never   Vaping Use    Vaping status: Never Used   Substance Use Topics    Alcohol use: Yes     Comment: 1 drink per month    Drug use: No           1/18/2024   LAST FHS-7 RESULTS   1st degree relative breast or ovarian cancer No   Any relative bilateral breast cancer No   Any male have breast cancer No   Any ONE woman have BOTH breast AND ovarian cancer No   Any woman with breast cancer before 50yrs No   2 or more relatives with breast AND/OR ovarian cancer No   2 or more relatives with breast AND/OR bowel cancer No           Mammogram Screening -  Mammogram every 1-2 years updated in Health Maintenance based on mutual decision making        2024   STI Screening   New sexual partner(s) since last STI/HIV test? No        History of abnormal Pap smear: No - age 30- 64 PAP with HPV every 5 years recommended        Latest Ref Rng & Units 10/19/2023     4:20 PM 2020     3:19 PM 2019     4:32 PM   PAP / HPV   PAP  Negative for Intraepithelial Lesion or Malignancy (NILM)  Negative for squamous intraepithelial lesion or malignancy  Electronically signed by Jose Fernandez CT (ASCP) on 2020 at  1:49 PM       PAP (Historical)    NIL    HPV 16 DNA Negative Negative  Negative  Negative    HPV 18 DNA Negative Negative  Negative  Negative    Other HR HPV Negative Negative  Negative  Negative      ASCVD Risk   The 10-year ASCVD risk score (Sierra CHAMBERLAIN, et al., 2019) is: 7.7%    Values used to calculate the score:      Age: 64 years      Sex: Female      Is Non- : Yes      Diabetic: No      Tobacco smoker: No      Systolic Blood Pressure: 136 mmHg      Is BP treated: No      HDL Cholesterol: 53 mg/dL      Total Cholesterol: 194 mg/dL    Fracture Risk Assessment Tool  Link to Frax Calculator  Use the information below to complete the Frax calculator  : 1960  Sex: female  Weight (kg): 110 kg (actual weight)  Height (cm): 161.7 cm  Previous Fragility Fracture:  No  History of parent with fractured hip:  No  Current Smoking:  No  Patient has been on glucocorticoids for more than 3 months (5mg/day or more): No  Rheumatoid Arthritis on Problem List:  No  Secondary Osteoporosis on Problem List:  No  Consumes 3 or more units of alcohol per day: No  Femoral Neck BMD (g/cm2)           Reviewed and updated as needed this visit by Provider   Tobacco  Allergies  Meds  Problems  Med Hx  Surg Hx  Fam Hx            Past Medical History:   Diagnosis Date    Autoimmune hepatitis (H)     Fibroids     Gastroesophageal  "reflux disease with esophagitis without hemorrhage 2/3/2021    Heartburn     Iron deficiency anemia     Morbid obesity (H) 10/24/2019    Pain in thumb joint with movement of left hand 1/2/2017    Radial styloid tenosynovitis 1/2/2017    Ulcer          Review of Systems  Constitutional, neuro, endocrine, pulmonary, cardiac, gastrointestinal, genitourinary, musculoskeletal, integument and psychiatric systems are negative, except as otherwise noted.     Objective    Exam  /72 (BP Location: Left arm, Patient Position: Sitting, Cuff Size: Adult Large)   Pulse 88   Temp 98.2  F (36.8  C) (Oral)   Resp 16   Ht 1.617 m (5' 3.68\")   Wt 110 kg (242 lb 9.6 oz)   LMP  (LMP Unknown)   SpO2 100%   BMI 42.06 kg/m     Estimated body mass index is 42.06 kg/m  as calculated from the following:    Height as of this encounter: 1.617 m (5' 3.68\").    Weight as of this encounter: 110 kg (242 lb 9.6 oz).    Physical Exam  GENERAL: alert and no distress  EYES: Eyes grossly normal to inspection, PERRL and conjunctivae and sclerae normal  HENT: ear canals and TM's normal, nose and mouth without ulcers or lesions  NECK: no adenopathy, no asymmetry, masses, or scars  RESP: lungs clear to auscultation - no rales, rhonchi or wheezes  CV: regular rate and rhythm, normal S1 S2, no S3 or S4, no murmur, click or rub, no peripheral edema  MS: no gross musculoskeletal defects noted, no edema        Signed Electronically by: Camille Chopra MD    "

## 2024-11-07 NOTE — PATIENT INSTRUCTIONS
Patient Education   Preventive Care Advice   This is general advice given by our system to help you stay healthy. However, your care team may have specific advice just for you. Please talk to your care team about your preventive care needs.  Nutrition  Eat 5 or more servings of fruits and vegetables each day.  Try wheat bread, brown rice and whole grain pasta (instead of white bread, rice, and pasta).  Get enough calcium and vitamin D. Check the label on foods and aim for 100% of the RDA (recommended daily allowance).  Lifestyle  Exercise at least 150 minutes each week  (30 minutes a day, 5 days a week).  Do muscle strengthening activities 2 days a week. These help control your weight and prevent disease.  No smoking.  Wear sunscreen to prevent skin cancer.  Have a dental exam and cleaning every 6 months.  Yearly exams  See your health care team every year to talk about:  Any changes in your health.  Any medicines your care team has prescribed.  Preventive care, family planning, and ways to prevent chronic diseases.  Shots (vaccines)   HPV shots (up to age 26), if you've never had them before.  Hepatitis B shots (up to age 59), if you've never had them before.  COVID-19 shot: Get this shot when it's due.  Flu shot: Get a flu shot every year.  Tetanus shot: Get a tetanus shot every 10 years.  Pneumococcal, hepatitis A, and RSV shots: Ask your care team if you need these based on your risk.  Shingles shot (for age 50 and up)  General health tests  Diabetes screening:  Starting at age 35, Get screened for diabetes at least every 3 years.  If you are younger than age 35, ask your care team if you should be screened for diabetes.  Cholesterol test: At age 39, start having a cholesterol test every 5 years, or more often if advised.  Bone density scan (DEXA): At age 50, ask your care team if you should have this scan for osteoporosis (brittle bones).  Hepatitis C: Get tested at least once in your life.  STIs (sexually  transmitted infections)  Before age 24: Ask your care team if you should be screened for STIs.  After age 24: Get screened for STIs if you're at risk. You are at risk for STIs (including HIV) if:  You are sexually active with more than one person.  You don't use condoms every time.  You or a partner was diagnosed with a sexually transmitted infection.  If you are at risk for HIV, ask about PrEP medicine to prevent HIV.  Get tested for HIV at least once in your life, whether you are at risk for HIV or not.  Cancer screening tests  Cervical cancer screening: If you have a cervix, begin getting regular cervical cancer screening tests starting at age 21.  Breast cancer scan (mammogram): If you've ever had breasts, begin having regular mammograms starting at age 40. This is a scan to check for breast cancer.  Colon cancer screening: It is important to start screening for colon cancer at age 45.  Have a colonoscopy test every 10 years (or more often if you're at risk) Or, ask your provider about stool tests like a FIT test every year or Cologuard test every 3 years.  To learn more about your testing options, visit:   .  For help making a decision, visit:   https://bit.ly/qf77931.  Prostate cancer screening test: If you have a prostate, ask your care team if a prostate cancer screening test (PSA) at age 55 is right for you.  Lung cancer screening: If you are a current or former smoker ages 50 to 80, ask your care team if ongoing lung cancer screenings are right for you.  For informational purposes only. Not to replace the advice of your health care provider. Copyright   2023 [place] [employer]. All rights reserved. Clinically reviewed by the [facility] Transitions Program. [employer] 525399 - REV 01/24.

## 2024-11-08 LAB
ALBUMIN SERPL BCG-MCNC: 4.2 G/DL (ref 3.5–5.2)
ALP SERPL-CCNC: 76 U/L (ref 40–150)
ALT SERPL W P-5'-P-CCNC: 8 U/L (ref 0–50)
AST SERPL W P-5'-P-CCNC: 16 U/L (ref 0–45)
BILIRUB DIRECT SERPL-MCNC: <0.2 MG/DL (ref 0–0.3)
BILIRUB SERPL-MCNC: 0.5 MG/DL
CHOLEST SERPL-MCNC: 208 MG/DL
FASTING STATUS PATIENT QL REPORTED: ABNORMAL
HDLC SERPL-MCNC: 58 MG/DL
IGG SERPL-MCNC: 1648 MG/DL (ref 610–1616)
LDLC SERPL CALC-MCNC: 138 MG/DL
NONHDLC SERPL-MCNC: 150 MG/DL
PROT SERPL-MCNC: 7.7 G/DL (ref 6.4–8.3)
TRIGL SERPL-MCNC: 61 MG/DL

## 2024-12-02 ENCOUNTER — APPOINTMENT (OUTPATIENT)
Dept: MRI IMAGING | Facility: CLINIC | Age: 64
End: 2024-12-02
Attending: EMERGENCY MEDICINE
Payer: COMMERCIAL

## 2024-12-02 ENCOUNTER — HOSPITAL ENCOUNTER (EMERGENCY)
Facility: CLINIC | Age: 64
Discharge: HOME | End: 2024-12-02
Attending: EMERGENCY MEDICINE | Admitting: EMERGENCY MEDICINE
Payer: COMMERCIAL

## 2024-12-02 VITALS
SYSTOLIC BLOOD PRESSURE: 164 MMHG | RESPIRATION RATE: 22 BRPM | WEIGHT: 242 LBS | OXYGEN SATURATION: 99 % | BODY MASS INDEX: 41.32 KG/M2 | HEART RATE: 62 BPM | DIASTOLIC BLOOD PRESSURE: 81 MMHG | TEMPERATURE: 97.5 F | HEIGHT: 64 IN

## 2024-12-02 DIAGNOSIS — H81.399 PERIPHERAL VERTIGO, UNSPECIFIED LATERALITY: ICD-10-CM

## 2024-12-02 LAB
ALBUMIN UR-MCNC: NEGATIVE MG/DL
ANION GAP SERPL CALCULATED.3IONS-SCNC: 9 MMOL/L (ref 7–15)
APPEARANCE UR: CLEAR
ATRIAL RATE - MUSE: 62 BPM
BASOPHILS # BLD AUTO: 0.1 10E3/UL (ref 0–0.2)
BASOPHILS NFR BLD AUTO: 1 %
BILIRUB UR QL STRIP: NEGATIVE
BUN SERPL-MCNC: 7.6 MG/DL (ref 8–23)
CALCIUM SERPL-MCNC: 9 MG/DL (ref 8.8–10.4)
CHLORIDE SERPL-SCNC: 106 MMOL/L (ref 98–107)
COLOR UR AUTO: ABNORMAL
CREAT SERPL-MCNC: 0.74 MG/DL (ref 0.51–0.95)
DIASTOLIC BLOOD PRESSURE - MUSE: NORMAL MMHG
EGFRCR SERPLBLD CKD-EPI 2021: 90 ML/MIN/1.73M2
EOSINOPHIL # BLD AUTO: 0.1 10E3/UL (ref 0–0.7)
EOSINOPHIL NFR BLD AUTO: 1 %
ERYTHROCYTE [DISTWIDTH] IN BLOOD BY AUTOMATED COUNT: 14.9 % (ref 10–15)
GLUCOSE SERPL-MCNC: 107 MG/DL (ref 70–99)
GLUCOSE UR STRIP-MCNC: NEGATIVE MG/DL
HCO3 SERPL-SCNC: 25 MMOL/L (ref 22–29)
HCT VFR BLD AUTO: 36.8 % (ref 35–47)
HGB BLD-MCNC: 11.6 G/DL (ref 11.7–15.7)
HGB UR QL STRIP: NEGATIVE
IMM GRANULOCYTES # BLD: 0 10E3/UL
IMM GRANULOCYTES NFR BLD: 0 %
INTERPRETATION ECG - MUSE: NORMAL
KETONES UR STRIP-MCNC: NEGATIVE MG/DL
LEUKOCYTE ESTERASE UR QL STRIP: NEGATIVE
LYMPHOCYTES # BLD AUTO: 2.1 10E3/UL (ref 0.8–5.3)
LYMPHOCYTES NFR BLD AUTO: 35 %
MAGNESIUM SERPL-MCNC: 2.1 MG/DL (ref 1.7–2.3)
MCH RBC QN AUTO: 26.4 PG (ref 26.5–33)
MCHC RBC AUTO-ENTMCNC: 31.5 G/DL (ref 31.5–36.5)
MCV RBC AUTO: 84 FL (ref 78–100)
MONOCYTES # BLD AUTO: 0.4 10E3/UL (ref 0–1.3)
MONOCYTES NFR BLD AUTO: 7 %
MUCOUS THREADS #/AREA URNS LPF: PRESENT /LPF
NEUTROPHILS # BLD AUTO: 3.4 10E3/UL (ref 1.6–8.3)
NEUTROPHILS NFR BLD AUTO: 56 %
NITRATE UR QL: NEGATIVE
NRBC # BLD AUTO: 0 10E3/UL
NRBC BLD AUTO-RTO: 0 /100
P AXIS - MUSE: 57 DEGREES
PH UR STRIP: 6.5 [PH] (ref 5–7)
PLATELET # BLD AUTO: 220 10E3/UL (ref 150–450)
POTASSIUM SERPL-SCNC: 3.6 MMOL/L (ref 3.4–5.3)
PR INTERVAL - MUSE: 180 MS
QRS DURATION - MUSE: 82 MS
QT - MUSE: 436 MS
QTC - MUSE: 442 MS
R AXIS - MUSE: -2 DEGREES
RBC # BLD AUTO: 4.39 10E6/UL (ref 3.8–5.2)
RBC URINE: <1 /HPF
SODIUM SERPL-SCNC: 140 MMOL/L (ref 135–145)
SP GR UR STRIP: 1.01 (ref 1–1.03)
SQUAMOUS EPITHELIAL: <1 /HPF
SYSTOLIC BLOOD PRESSURE - MUSE: NORMAL MMHG
T AXIS - MUSE: 50 DEGREES
TROPONIN T SERPL HS-MCNC: 13 NG/L
TROPONIN T SERPL HS-MCNC: <6 NG/L
UROBILINOGEN UR STRIP-MCNC: <2 MG/DL
VENTRICULAR RATE- MUSE: 62 BPM
WBC # BLD AUTO: 6 10E3/UL (ref 4–11)
WBC URINE: 1 /HPF

## 2024-12-02 PROCEDURE — 80048 BASIC METABOLIC PNL TOTAL CA: CPT | Performed by: EMERGENCY MEDICINE

## 2024-12-02 PROCEDURE — 99285 EMERGENCY DEPT VISIT HI MDM: CPT | Mod: 25

## 2024-12-02 PROCEDURE — 70549 MR ANGIOGRAPH NECK W/O&W/DYE: CPT

## 2024-12-02 PROCEDURE — 250N000011 HC RX IP 250 OP 636: Performed by: EMERGENCY MEDICINE

## 2024-12-02 PROCEDURE — 82374 ASSAY BLOOD CARBON DIOXIDE: CPT | Performed by: EMERGENCY MEDICINE

## 2024-12-02 PROCEDURE — 83735 ASSAY OF MAGNESIUM: CPT | Performed by: EMERGENCY MEDICINE

## 2024-12-02 PROCEDURE — 93005 ELECTROCARDIOGRAM TRACING: CPT | Performed by: EMERGENCY MEDICINE

## 2024-12-02 PROCEDURE — 255N000002 HC RX 255 OP 636: Performed by: EMERGENCY MEDICINE

## 2024-12-02 PROCEDURE — 250N000013 HC RX MED GY IP 250 OP 250 PS 637: Performed by: EMERGENCY MEDICINE

## 2024-12-02 PROCEDURE — 85004 AUTOMATED DIFF WBC COUNT: CPT | Performed by: EMERGENCY MEDICINE

## 2024-12-02 PROCEDURE — 96374 THER/PROPH/DIAG INJ IV PUSH: CPT | Mod: 59

## 2024-12-02 PROCEDURE — 70544 MR ANGIOGRAPHY HEAD W/O DYE: CPT

## 2024-12-02 PROCEDURE — 85041 AUTOMATED RBC COUNT: CPT | Performed by: EMERGENCY MEDICINE

## 2024-12-02 PROCEDURE — 70553 MRI BRAIN STEM W/O & W/DYE: CPT

## 2024-12-02 PROCEDURE — A9585 GADOBUTROL INJECTION: HCPCS | Performed by: EMERGENCY MEDICINE

## 2024-12-02 PROCEDURE — 36415 COLL VENOUS BLD VENIPUNCTURE: CPT | Performed by: EMERGENCY MEDICINE

## 2024-12-02 PROCEDURE — 81001 URINALYSIS AUTO W/SCOPE: CPT | Performed by: EMERGENCY MEDICINE

## 2024-12-02 PROCEDURE — 84484 ASSAY OF TROPONIN QUANT: CPT | Performed by: EMERGENCY MEDICINE

## 2024-12-02 RX ORDER — ONDANSETRON 4 MG/1
4-8 TABLET, ORALLY DISINTEGRATING ORAL EVERY 8 HOURS PRN
Qty: 20 TABLET | Refills: 0 | Status: SHIPPED | OUTPATIENT
Start: 2024-12-02

## 2024-12-02 RX ORDER — GADOBUTROL 604.72 MG/ML
10 INJECTION INTRAVENOUS ONCE
Status: COMPLETED | OUTPATIENT
Start: 2024-12-02 | End: 2024-12-02

## 2024-12-02 RX ORDER — MECLIZINE HYDROCHLORIDE 25 MG/1
25 TABLET ORAL 3 TIMES DAILY PRN
Qty: 21 TABLET | Refills: 0 | Status: SHIPPED | OUTPATIENT
Start: 2024-12-02 | End: 2024-12-02

## 2024-12-02 RX ORDER — ONDANSETRON 4 MG/1
4-8 TABLET, ORALLY DISINTEGRATING ORAL EVERY 8 HOURS PRN
Qty: 20 TABLET | Refills: 0 | Status: SHIPPED | OUTPATIENT
Start: 2024-12-02 | End: 2024-12-02

## 2024-12-02 RX ORDER — ONDANSETRON 2 MG/ML
8 INJECTION INTRAMUSCULAR; INTRAVENOUS ONCE
Status: COMPLETED | OUTPATIENT
Start: 2024-12-02 | End: 2024-12-02

## 2024-12-02 RX ORDER — MECLIZINE HYDROCHLORIDE 25 MG/1
25 TABLET ORAL ONCE
Status: COMPLETED | OUTPATIENT
Start: 2024-12-02 | End: 2024-12-02

## 2024-12-02 RX ORDER — MECLIZINE HYDROCHLORIDE 25 MG/1
25 TABLET ORAL 3 TIMES DAILY PRN
Qty: 21 TABLET | Refills: 0 | Status: SHIPPED | OUTPATIENT
Start: 2024-12-02

## 2024-12-02 RX ADMIN — ONDANSETRON 8 MG: 2 INJECTION, SOLUTION INTRAMUSCULAR; INTRAVENOUS at 09:20

## 2024-12-02 RX ADMIN — MECLIZINE 25 MG: 25 TABLET ORAL at 09:22

## 2024-12-02 RX ADMIN — GADOBUTROL 10 ML: 604.72 INJECTION INTRAVENOUS at 11:34

## 2024-12-02 ASSESSMENT — ACTIVITIES OF DAILY LIVING (ADL)
ADLS_ACUITY_SCORE: 41

## 2024-12-02 ASSESSMENT — COLUMBIA-SUICIDE SEVERITY RATING SCALE - C-SSRS
2. HAVE YOU ACTUALLY HAD ANY THOUGHTS OF KILLING YOURSELF IN THE PAST MONTH?: NO
1. IN THE PAST MONTH, HAVE YOU WISHED YOU WERE DEAD OR WISHED YOU COULD GO TO SLEEP AND NOT WAKE UP?: NO
6. HAVE YOU EVER DONE ANYTHING, STARTED TO DO ANYTHING, OR PREPARED TO DO ANYTHING TO END YOUR LIFE?: NO

## 2024-12-02 NOTE — ED PROVIDER NOTES
EMERGENCY DEPARTMENT ENCOUNTER     NAME: Roxie Vargas   AGE: 64 year old female   YOB: 1960   MRN: [id number]   EVALUATION DATE & TIME: 12/2/2024  8:58 AM   PCP: Camille Chopra     Chief Complaint   Patient presents with    Dizziness    Generalized Weakness   :    FINAL IMPRESSION       1. Peripheral vertigo, unspecified laterality           ED COURSE & MEDICAL DECISION MAKING      Pertinent Labs & Imaging studies reviewed. (See chart for details)   64 year old female  presents to the Emergency Department for evaluation of waking up with vertigo this morning and having 2 episodes of vomiting.  She has not had any other neurologic symptoms, no previous history of vertigo in the past. Initial Vitals Reviewed. Initial exam notable for well-appearing patient who does have some very slight nystagmus present.  Given age and comorbidities I considered a central pathology but her clinical picture is most consistent with likely peripheral vertigo.  Labs were done to rule out severe anemia, infection, arrhythmia, ACS and everything is reassuring.  EKG is nonischemic and no arrhythmias were noted on cardiac monitoring.  I did end up getting an MRI to rule out CVA, posterior mass and this is unremarkable.  I subsequently treated the patient with meclizine and Zofran with clinical improvement.  Results discussed for peripheral vertigo and I will have her try the Epley maneuver and ongoing prescriptions for Zofran and meclizine at home.  She will follow-up instructions if not improving were given and she is comfortable with discharge in stable improved condition.        9:03 AM I met with the patient, obtained history, performed an initial exam, and discussed options and plan for diagnostics and treatment here in the ED.  1:13 PM updated patient, she is feeling better and ready to go home.        At the conclusion of the encounter I discussed the results of all of the tests and the disposition. The questions were  answered. The patient or family acknowledged understanding and was agreeable with the care plan.     0 minutes critical care time, see procedure note below for details if relevant    Medical Decision Making    History:  Supplemental history from: N/A  External Record(s) reviewed: Documented in chart, office visit 11/7/2024    Work Up:  Chart documentation includes differential considered and any EKGs or imaging independently interpreted by provider, where specified.  In additional to work up documented, I considered the following work up: Documented in chart, if applicable.    External consultation:  Discussion of management with another provider: Documented in chart, if applicable    Complicating factors:  Care impacted by chronic illness: Documented in Chart  Care affected by social determinants of health: N/A    Disposition considerations: Discharge. I prescribed additional prescription strength medication(s) as charted. I considered admission, but ultimately discharged patient with reassuring workup and clinical improvement.    Not Applicable             MEDICATIONS GIVEN IN THE EMERGENCY:   Medications   meclizine (ANTIVERT) tablet 25 mg (has no administration in time range)   ondansetron (ZOFRAN) injection 8 mg (has no administration in time range)      NEW PRESCRIPTIONS STARTED AT TODAY'S ER VISIT   New Prescriptions    MECLIZINE (ANTIVERT) 25 MG TABLET    Take 1 tablet (25 mg) by mouth 3 times daily as needed for dizziness.    ONDANSETRON (ZOFRAN ODT) 4 MG ODT TAB    Take 1-2 tablets (4-8 mg) by mouth every 8 hours as needed for nausea.     ================================================================   HISTORY OF PRESENT ILLNESS       Patient information was obtained from: patient    Use of Intrepreter: N/A   Roxie Vargas is a 64 year old female with history of autoimmune hepatitis, and GERD who presents dizziness, and generalized weakness.     Patient stated that she woke up at 6:00 AM lying flat on  her bed feeling dizzy. She described the room as spinning. She tried drinking water and coconut milk, but this caused her to vomit. In the ED, she still feels nauseous. She has never had this happen to her before.     ================================================================        PAST HISTORY     PAST MEDICAL HISTORY:   Past Medical History:   Diagnosis Date    Autoimmune hepatitis (H)     Fibroids     Gastroesophageal reflux disease with esophagitis without hemorrhage 2/3/2021    Heartburn     Iron deficiency anemia     Morbid obesity (H) 10/24/2019    Pain in thumb joint with movement of left hand 1/2/2017    Radial styloid tenosynovitis 1/2/2017    Ulcer       PAST SURGICAL HISTORY:   Past Surgical History:   Procedure Laterality Date    COLONOSCOPY  4/1/2013    Procedure: COLONOSCOPY;;  Surgeon: Tobias Merchant MD;  Location: [facility]    COLONOSCOPY N/A 10/2/2024    Procedure: COLONOSCOPY, WITH POLYPECTOMY;  Surgeon: Leventhal, Thomas Michael, MD;  Location: [facility] OR    dilation & curretage        uterine fibroids removed[  2005    wisdom teeth[        CURRENT MEDICATIONS:   ascorbic acid (VITAMIN C) 1000 MG TABS  bisacodyl (DULCOLAX) 5 MG EC tablet  calcium citrate and vitamin D (CITRACAL) 200-250 MG-UNIT TABS per tablet  carboxymethylcellulose (CARBOXYMETHYLCELLULOSE SODIUM) 0.5 % SOLN ophthalmic solution  Ferrous Sulfate (IRON CR PO)  fluticasone (FLONASE) 50 MCG/ACT nasal spray  ibuprofen (ADVIL,MOTRIN) 600 MG tablet  loratadine (CLARITIN) 10 MG tablet  multivitamin (CENTRUM SILVER) tablet  polyethylene glycol (GOLYTELY) 236 g suspension      ALLERGIES:   Allergies   Allergen Reactions    Shrimp Anaphylaxis    Aspirin Other (See Comments)     Watery eyes. Happened a long time ago.       FAMILY HISTORY:   Family History   Problem Relation Age of Onset    Asthma Sister     Peptic Ulcer Disease Sister         and 2 nephews    Hypertension Mother       SOCIAL HISTORY:   Social History     Socioeconomic  History    Marital status: Single   Occupational History    Occupation: Medical Records     Employer: [employer]   Tobacco Use    Smoking status: Never     Passive exposure: Never    Smokeless tobacco: Never   Vaping Use    Vaping status: Never Used   Substance and Sexual Activity    Alcohol use: Yes     Comment: 1 drink per month    Drug use: No    Sexual activity: Not Currently   Social History Narrative    Immigrated from [facility] [place].             How much exercise per week? Daily activities    How much calcium per day? supplements       How much caffeine per day? Tea 1     How much vitamin D per day? In supplements    Do you/your family wear seatbelts?  Yes    Do you/your family use safety helmets? Yes    Do you/your family use sunscreen? Yes    Do you/your family keep firearms in the home? No    Do you/your family have a smoke detector(s)? Yes            Reviewed cmckim lpn 12-     Social Drivers of Health     Financial Resource Strain: Low Risk  (11/7/2024)    Financial Resource Strain     Within the past 12 months, have you or your family members you live with been unable to get utilities (heat, electricity) when it was really needed?: No   Food Insecurity: Low Risk  (11/7/2024)    Food Insecurity     Within the past 12 months, did you worry that your food would run out before you got money to buy more?: No     Within the past 12 months, did the food you bought just not last and you didn t have money to get more?: No   Transportation Needs: Low Risk  (11/7/2024)    Transportation Needs     Within the past 12 months, has lack of transportation kept you from medical appointments, getting your medicines, non-medical meetings or appointments, work, or from getting things that you need?: No   Physical Activity: Unknown (11/7/2024)    Exercise Vital Sign     Days of Exercise per Week: 2 days   Stress: No Stress Concern Present (11/7/2024)    [facility] of Occupational Health -  "Occupational Stress Questionnaire     Feeling of Stress : Not at all   Social Connections: Unknown (11/7/2024)    Social Connection and Isolation Panel [NHANES]     Frequency of Social Gatherings with Friends and Family: Once a week   Interpersonal Safety: Low Risk  (11/7/2024)    Interpersonal Safety     Do you feel physically and emotionally safe where you currently live?: Yes     Within the past 12 months, have you been hit, slapped, kicked or otherwise physically hurt by someone?: No     Within the past 12 months, have you been humiliated or emotionally abused in other ways by your partner or ex-partner?: No   Housing Stability: Low Risk  (11/7/2024)    Housing Stability     Do you have housing? : Yes     Are you worried about losing your housing?: No        VITALS  Patient Vitals for the past 24 hrs:   BP Temp Temp src Pulse Resp SpO2 Height Weight   12/02/24 0856 (!) 159/70 97.5  F (36.4  C) Temporal 63 16 100 % 1.626 m (5' 4\") 109.8 kg (242 lb)        ================================================================    PHYSICAL EXAM     VITAL SIGNS: BP (!) 159/70   Pulse 63   Temp 97.5  F (36.4  C) (Temporal)   Resp 16   Ht 1.626 m (5' 4\")   Wt 109.8 kg (242 lb)   LMP  (LMP Unknown)   SpO2 100%   BMI 41.54 kg/m     Constitutional:  Awake, no acute distress   HENT:  Atraumatic, oropharynx without exudate or erythema, membranes moist  Lymph:  No adenopathy  Eyes: EOM intact, PERRL, no injection  Neck: Supple  Respiratory:  Clear to auscultation bilaterally, no wheezes or crackles   Cardiovascular:  Regular rate and rhythm, single S1 and S2   GI:  Soft, nontender, nondistended, no rebound or guarding   Musculoskeletal:  Moves all extremities, no lower extremity edema, no deformities    Skin:  Warm, dry  Neurologic:  Alert and oriented x3, no focal deficits noted       ================================================================  LAB       All pertinent labs reviewed and interpreted.   Labs Ordered and " Resulted from Time of ED Arrival to Time of ED Departure   BASIC METABOLIC PANEL - Abnormal       Result Value    Sodium 140      Potassium 3.6      Chloride 106      Carbon Dioxide (CO2) 25      Anion Gap 9      Urea Nitrogen 7.6 (*)     Creatinine 0.74      GFR Estimate 90      Calcium 9.0      Glucose 107 (*)    ROUTINE UA WITH MICROSCOPIC REFLEX TO CULTURE - Abnormal    Color Urine Light Yellow      Appearance Urine Clear      Glucose Urine Negative      Bilirubin Urine Negative      Ketones Urine Negative      Specific Gravity Urine 1.012      Blood Urine Negative      pH Urine 6.5      Protein Albumin Urine Negative      Urobilinogen Urine <2.0      Nitrite Urine Negative      Leukocyte Esterase Urine Negative      Mucus Urine Present (*)     RBC Urine <1      WBC Urine 1      Squamous Epithelials Urine <1     CBC WITH PLATELETS AND DIFFERENTIAL - Abnormal    WBC Count 6.0      RBC Count 4.39      Hemoglobin 11.6 (*)     Hematocrit 36.8      MCV 84      MCH 26.4 (*)     MCHC 31.5      RDW 14.9      Platelet Count 220      % Neutrophils 56      % Lymphocytes 35      % Monocytes 7      % Eosinophils 1      % Basophils 1      % Immature Granulocytes 0      NRBCs per 100 WBC 0      Absolute Neutrophils 3.4      Absolute Lymphocytes 2.1      Absolute Monocytes 0.4      Absolute Eosinophils 0.1      Absolute Basophils 0.1      Absolute Immature Granulocytes 0.0      Absolute NRBCs 0.0     TROPONIN T, HIGH SENSITIVITY - Normal    Troponin T, High Sensitivity 13     MAGNESIUM - Normal    Magnesium 2.1     TROPONIN T, HIGH SENSITIVITY - Normal    Troponin T, High Sensitivity <6          ===============================================================  RADIOLOGY       Reviewed all pertinent imaging. Please see official radiology report.   MRA Neck (Carotids) wo & w Contrast   Final Result   IMPRESSION:   1.  No restricted diffusion to suggest acute ischemia.      2.  Unremarkable MRA of the head and neck.      MRA Brain  ([place]) wo Contrast   Final Result   IMPRESSION:   1.  No restricted diffusion to suggest acute ischemia.      2.  Unremarkable MRA of the head and neck.      MR Brain w/o & w Contrast    (Results Pending)         ================================================================  EKG     EKG reviewed interpreted by me shows sinus rhythm with a rate of 62, normal axis, QTc 442 with no acute ST or T wave changes since December 2009    I have independently reviewed and interpreted the EKG(s) documented above.     ================================================================  PROCEDURES         I, Chris Nguyen, am serving as a scribe to document services personally performed by Dr. Webb based on my observation and the provider's statements to me. I, Elina Webb MD attest that Chris Nguyen is acting in a scribe capacity, has observed my performance of the services and has documented them in accordance with my direction.   Elina Webb M.D.   Emergency Medicine   [facility] EMERGENCY ROOM  [address] [facility] [address]  [phone]  Dept: [phone]        Elina Webb MD  12/02/24 [id number]

## 2024-12-02 NOTE — DISCHARGE INSTRUCTIONS
As we discussed, brain imaging is negative and symptoms are consistent with peripheral vertigo.  Use the medications prescribed, and do the exercises we discussed.  If not improving make a follow-up appointment with your doctor.

## 2024-12-02 NOTE — ED TRIAGE NOTES
Pt states at 620am woke up feeling dizzy and like everything was spinning. States up to bathroom at 3am and didn't feel dizzy. Pt states took a shower and doesn't feel dizzy now but feels nauseated and generalized weakness. Pt states vomited one time this morning, denies chest pain, denies shortness of breath, denies headache. Denies vision changes. Pt states Hx of iron deficiency and usually feels dizzy but not usually this much.

## 2024-12-03 ENCOUNTER — PATIENT OUTREACH (OUTPATIENT)
Dept: FAMILY MEDICINE | Facility: CLINIC | Age: 64
End: 2024-12-03
Payer: COMMERCIAL

## 2024-12-30 ENCOUNTER — VIRTUAL VISIT (OUTPATIENT)
Dept: FAMILY MEDICINE | Facility: CLINIC | Age: 64
End: 2024-12-30
Payer: COMMERCIAL

## 2024-12-30 DIAGNOSIS — R03.0 ELEVATED BLOOD PRESSURE READING WITHOUT DIAGNOSIS OF HYPERTENSION: ICD-10-CM

## 2024-12-30 DIAGNOSIS — H81.10 BENIGN PAROXYSMAL POSITIONAL VERTIGO, UNSPECIFIED LATERALITY: Primary | ICD-10-CM

## 2024-12-30 PROCEDURE — 99213 OFFICE O/P EST LOW 20 MIN: CPT | Mod: 95 | Performed by: FAMILY MEDICINE

## 2024-12-30 PROCEDURE — G2211 COMPLEX E/M VISIT ADD ON: HCPCS | Mod: 95 | Performed by: FAMILY MEDICINE

## 2024-12-30 RX ORDER — MECLIZINE HYDROCHLORIDE 25 MG/1
25 TABLET ORAL 3 TIMES DAILY PRN
Qty: 21 TABLET | Refills: 0 | Status: SHIPPED | OUTPATIENT
Start: 2024-12-30

## 2024-12-30 NOTE — PROGRESS NOTES
Roxie is a 64 year old who is being evaluated via a billable video visit.    How would you like to obtain your AVS? MyChart  If the video visit is dropped, the invitation should be resent by: Text to cell phone: [phone]  Will anyone else be joining your video visit? No    Roxie was seen today for er f/u.    Diagnoses and all orders for this visit:    Benign paroxysmal positional vertigo, unspecified laterality  -     meclizine (ANTIVERT) 25 MG tablet; Take 1 tablet (25 mg) by mouth 3 times daily as needed for dizziness.    Elevated blood pressure reading without diagnosis of hypertension  -     meclizine (ANTIVERT) 25 MG tablet; Take 1 tablet (25 mg) by mouth 3 times daily as needed for dizziness.  -     Home Blood Pressure Monitor Order for DME - ONLY FOR DME    Other orders  -     PRIMARY CARE FOLLOW-UP SCHEDULING; Future       Patient had randomly checked her blood pressure since discharge from the ER visit and that was within 130s over 80s but recommend patient to check her blood pressure when she feels dizzy her blood pressure at home prescription for blood pressure monitoring given to the patient but if.  Blood pressure continue to be elevated she needs to be seen in the clinic for further treatment and management  For now continue meclizine as needed stay hydrated plenty of rest follow-up if any worsening of the symptoms    The longitudinal plan of care for the diagnosis(es)/condition(s) as documented were addressed during this visit. Due to the added complexity in care, I will continue to support Roxie in the subsequent management and with ongoing continuity of care.  Subjective   Roxie is a 64 year old, presenting for the following health issues:  ER F/U (WW 12/2 dizziness. Fainted, 12/22. Not using Zofran. )        11/7/2024     2:18 PM   Additional Questions   Roomed by Kristan HDEZ     ED/UC Followup:    Facility:  [facility]  Date of visit: 12/2  Reason for visit: vertigo, fainted again on  12/22/24   Current Status: stable  Overall feeling much better since seen at the ER visit took some time off because work is quite stressful for her.  On her episode of fainting happened around Alyssa denies any chest pain pressure shortness of breath diaphoresis nausea vomiting during that episode she was just caught off guard and one of the relative helped her.  No accidental fall.          To review all her labs during the ER visit they were all within normal limit      Review of Systems  Constitutional, neuro, ENT, endocrine, pulmonary, cardiac, gastrointestinal, genitourinary, musculoskeletal, integument and psychiatric systems are negative, except as otherwise noted.      Objective    Vitals - Patient Reported  Systolic (Patient Reported): 134  Diastolic (Patient Reported): 88      Vitals:  No vitals were obtained today due to virtual visit.    Physical Exam   GENERAL: alert and no distress  EYES: Eyes grossly normal to inspection.  No discharge or erythema, or obvious scleral/conjunctival abnormalities.  RESP: No audible wheeze, cough, or visible cyanosis.    SKIN: Visible skin clear. No significant rash, abnormal pigmentation or lesions.  NEURO: Cranial nerves grossly intact.  Mentation and speech appropriate for age.  PSYCH: Appropriate affect, tone, and pace of words    Admission on 12/02/2024, Discharged on 12/02/2024   Component Date Value Ref Range Status    Sodium 12/02/2024 140  135 - 145 mmol/L Final    Potassium 12/02/2024 3.6  3.4 - 5.3 mmol/L Final    Chloride 12/02/2024 106  98 - 107 mmol/L Final    Carbon Dioxide (CO2) 12/02/2024 25  22 - 29 mmol/L Final    Anion Gap 12/02/2024 9  7 - 15 mmol/L Final    Urea Nitrogen 12/02/2024 7.6 (L)  8.0 - 23.0 mg/dL Final    Creatinine 12/02/2024 0.74  0.51 - 0.95 mg/dL Final    GFR Estimate 12/02/2024 90  >60 mL/min/1.73m2 Final    eGFR calculated using 2021 CKD-EPI equation.    Calcium 12/02/2024 9.0  8.8 - 10.4 mg/dL Final    Reference intervals for  this test were updated on 7/16/2024 to reflect our healthy population more accurately. There may be differences in the flagging of prior results with similar values performed with this method. Those prior results can be interpreted in the context of the updated reference intervals.    Glucose 12/02/2024 107 (H)  70 - 99 mg/dL Final    Troponin T, High Sensitivity 12/02/2024 13  <=14 ng/L Final    Either a High Sensitivity Troponin T baseline (0 hours) value = 100 ng/L, or an increase in High Sensitivity Troponin T = 7 ng/L at 2 hours compared to 0 hours (2-0 hours), suggests myocardial injury, and urgent clinical attention is required.    If the 2-0 hours increase is <7 ng/L, a High Sensitivity Troponin T result above gender-specific reference ranges warrants further evaluation.   Recommendations for further evaluation include correlation with clinical decision-making tool (e.g., HEART), a 3rd High Sensitivity Troponin T test 2 hours after the 2nd (a 20% change from baseline would represent concern), admission for observation, close PCC/cardiology follow-up, or urgent outpatient provocative testing.    Magnesium 12/02/2024 2.1  1.7 - 2.3 mg/dL Final    Color Urine 12/02/2024 Light Yellow  Colorless, Straw, Light Yellow, Yellow Final    Appearance Urine 12/02/2024 Clear  Clear Final    Glucose Urine 12/02/2024 Negative  Negative mg/dL Final    Bilirubin Urine 12/02/2024 Negative  Negative Final    Ketones Urine 12/02/2024 Negative  Negative mg/dL Final    Specific Gravity Urine 12/02/2024 1.012  1.001 - 1.030 Final    Blood Urine 12/02/2024 Negative  Negative Final    pH Urine 12/02/2024 6.5  5.0 - 7.0 Final    Protein Albumin Urine 12/02/2024 Negative  Negative mg/dL Final    Urobilinogen Urine 12/02/2024 <2.0  <2.0 mg/dL Final    Nitrite Urine 12/02/2024 Negative  Negative Final    Leukocyte Esterase Urine 12/02/2024 Negative  Negative Final    Mucus Urine 12/02/2024 Present (A)  None Seen /LPF Final    RBC Urine  12/02/2024 <1  <=2 /HPF Final    WBC Urine 12/02/2024 1  <=5 /HPF Final    Squamous Epithelials Urine 12/02/2024 <1  <=1 /HPF Final    Ventricular Rate 12/02/2024 62  BPM Final    Atrial Rate 12/02/2024 62  BPM Final    OK Interval 12/02/2024 180  ms Final    QRS Duration 12/02/2024 82  ms Final    QT 12/02/2024 436  ms Final    QTc 12/02/2024 442  ms Final    P Axis 12/02/2024 57  degrees Final    R AXIS 12/02/2024 -2  degrees Final    T Axis 12/02/2024 50  degrees Final    Interpretation ECG 12/02/2024    Final                    Value:Sinus rhythm  Normal ECG  When compared with ECG of 22-Dec-2009 08:44,  No significant change was found  Confirmed by SEE ED PROVIDER NOTE FOR, ECG INTERPRETATION ([id number]),  Jumana Moore ([id number]) on 12/2/2024 10:35:13 AM      WBC Count 12/02/2024 6.0  4.0 - 11.0 10e3/uL Final    RBC Count 12/02/2024 4.39  3.80 - 5.20 10e6/uL Final    Hemoglobin 12/02/2024 11.6 (L)  11.7 - 15.7 g/dL Final    Hematocrit 12/02/2024 36.8  35.0 - 47.0 % Final    MCV 12/02/2024 84  78 - 100 fL Final    MCH 12/02/2024 26.4 (L)  26.5 - 33.0 pg Final    MCHC 12/02/2024 31.5  31.5 - 36.5 g/dL Final    RDW 12/02/2024 14.9  10.0 - 15.0 % Final    Platelet Count 12/02/2024 220  150 - 450 10e3/uL Final    % Neutrophils 12/02/2024 56  % Final    % Lymphocytes 12/02/2024 35  % Final    % Monocytes 12/02/2024 7  % Final    % Eosinophils 12/02/2024 1  % Final    % Basophils 12/02/2024 1  % Final    % Immature Granulocytes 12/02/2024 0  % Final    NRBCs per 100 WBC 12/02/2024 0  <1 /100 Final    Absolute Neutrophils 12/02/2024 3.4  1.6 - 8.3 10e3/uL Final    Absolute Lymphocytes 12/02/2024 2.1  0.8 - 5.3 10e3/uL Final    Absolute Monocytes 12/02/2024 0.4  0.0 - 1.3 10e3/uL Final    Absolute Eosinophils 12/02/2024 0.1  0.0 - 0.7 10e3/uL Final    Absolute Basophils 12/02/2024 0.1  0.0 - 0.2 10e3/uL Final    Absolute Immature Granulocytes 12/02/2024 0.0  <=0.4 10e3/uL Final    Absolute NRBCs 12/02/2024 0.0  10e3/uL  Final    Troponin T, High Sensitivity 12/02/2024 <6  <=14 ng/L Final    Either a High Sensitivity Troponin T baseline (0 hours) value = 100 ng/L, or an increase in High Sensitivity Troponin T = 7 ng/L at 2 hours compared to 0 hours (2-0 hours), suggests myocardial injury, and urgent clinical attention is required.    If the 2-0 hours increase is <7 ng/L, a High Sensitivity Troponin T result above gender-specific reference ranges warrants further evaluation.   Recommendations for further evaluation include correlation with clinical decision-making tool (e.g., HEART), a 3rd High Sensitivity Troponin T test 2 hours after the 2nd (a 20% change from baseline would represent concern), admission for observation, close PCC/cardiology follow-up, or urgent outpatient provocative testing.         Video-Visit Details    Type of service:  Video Visit   Originating Location (pt. Location): Home    Distant Location (provider location):  On-site  Platform used for Video Visit: Osir[employer]  Signed Electronically by: Camille Chopra MD

## 2025-01-22 ENCOUNTER — TELEPHONE (OUTPATIENT)
Dept: FAMILY MEDICINE | Facility: CLINIC | Age: 65
End: 2025-01-22
Payer: COMMERCIAL

## 2025-01-22 NOTE — TELEPHONE ENCOUNTER
Reason for Call:  Appointment Request    Patient requesting this type of appt: Chronic Diease Management/Medication/Follow-Up    Requested provider: Camille Chopra    Reason patient unable to be scheduled: Needs to be scheduled by clinic    When does patient want to be seen/preferred time:  Sometime beginning of February would be fine.    Comments: Prefers to have visit with PCP as this is a follow up.    Could we send this information to you in [place] or would you prefer to receive a phone call?:   Patient would prefer a phone call   Okay to leave a detailed message?: Yes at Cell number on file:    Telephone Information:   Mobile [phone]       Call taken on 1/22/2025 at 12:42 PM by Nikia Frye

## 2025-02-04 ENCOUNTER — OFFICE VISIT (OUTPATIENT)
Dept: FAMILY MEDICINE | Facility: CLINIC | Age: 65
End: 2025-02-04
Payer: COMMERCIAL

## 2025-02-04 VITALS
RESPIRATION RATE: 18 BRPM | HEIGHT: 64 IN | OXYGEN SATURATION: 99 % | TEMPERATURE: 97.5 F | SYSTOLIC BLOOD PRESSURE: 138 MMHG | HEART RATE: 82 BPM | BODY MASS INDEX: 42.68 KG/M2 | DIASTOLIC BLOOD PRESSURE: 74 MMHG | WEIGHT: 250 LBS

## 2025-02-04 DIAGNOSIS — Z12.31 VISIT FOR SCREENING MAMMOGRAM: ICD-10-CM

## 2025-02-04 DIAGNOSIS — H81.13 BENIGN PAROXYSMAL POSITIONAL VERTIGO DUE TO BILATERAL VESTIBULAR DISORDER: Primary | ICD-10-CM

## 2025-02-04 PROCEDURE — 99214 OFFICE O/P EST MOD 30 MIN: CPT | Performed by: FAMILY MEDICINE

## 2025-02-04 NOTE — PROGRESS NOTES
Roxie was seen today for follow up.    Diagnoses and all orders for this visit:    Benign paroxysmal positional vertigo due to bilateral vestibular disorder  Comments:  Referral to physical therapy as patient worries that why her symptoms are not getting better.  Reassured the benign nature of vertigo  Orders:  -     Physical Therapy  Referral; Future    Visit for screening mammogram  -     MA Screening Bilateral w/ Raymon; Future    Other orders  -     REVIEW OF HEALTH MAINTENANCE PROTOCOL ORDERS         Subjective     Roxie Vargas 64 year old  presenting for the following health issues:    Patient presents with:  Follow Up: F/U dizziness           2/4/2025    11:35 AM   Additional Questions   Roomed by NATACHA Deleon   Accompanied by self       History of Present Illness       Reason for visit:  Follow up on dizzyness    She eats 0-1 servings of fruits and vegetables daily.She consumes 1 sweetened beverage(s) daily.She exercises with enough effort to increase her heart rate 9 or less minutes per day.  She exercises with enough effort to increase her heart rate 3 or less days per week.   She is taking medications regularly.     Patient diagnosed with benign positional vertigo which seems to be improving now symptoms only when she doing any rapid physical activity.  Need for meclizine has gone down but still quite worried why she got those symptoms and her family is quite worried in case she has to drive when she has dizziness.  Noted send fall only fall was in December 22 when she was seen at the ER    Patient recall when she was 25- 26-year-old and when she came from [facility] she had complete evaluation done for dizziness that time she was diagnosed with very low hemoglobin and when they iron was replaced she was feeling much better after that her most recent hemoglobin was 11.6 at ER visit which was slightly better than 11.4 at her preventive visit  Patient is taking iron supplement  Patient started  checking her blood pressure at home which coming back in very good range        Patient Active Problem List   Diagnosis    Anemia    Myopia    Bilateral chronic knee pain    Autoimmune hepatitis (H)    Class 2 obesity without serious comorbidity with body mass index (BMI) of 38.0 to 38.9 in adult    Sensation of cold in lower extremity    Prediabetes    History of uterine fibroid    Cervical cancer screening    S/P colon polypectomy benign      Social History     Social History Narrative    Immigrated from [place] 1982.             How much exercise per week? Daily activities    How much calcium per day? supplements       How much caffeine per day? Tea 1     How much vitamin D per day? In supplements    Do you/your family wear seatbelts?  Yes    Do you/your family use safety helmets? Yes    Do you/your family use sunscreen? Yes    Do you/your family keep firearms in the home? No    Do you/your family have a smoke detector(s)? Yes            Reviewed cmckim lpn 12-      Current Outpatient Medications   Medication Sig Dispense Refill    ascorbic acid (VITAMIN C) 1000 MG TABS Take 1,000 mg by mouth daily.      calcium citrate and vitamin D (CITRACAL) 200-250 MG-UNIT TABS per tablet Take 1 tablet by mouth 2 times daily      carboxymethylcellulose (CARBOXYMETHYLCELLULOSE SODIUM) 0.5 % SOLN ophthalmic solution Place 1-2 drops into both eyes 3 times daily as needed for dry eyes 1 Bottle 3    Ferrous Sulfate (IRON CR PO) Take 1 tablet by mouth daily.      ibuprofen (ADVIL,MOTRIN) 600 MG tablet Take 600 mg by mouth every 6 hours as needed.      loratadine (CLARITIN) 10 MG tablet Take 1 tablet (10 mg) by mouth daily. 30 tablet 1    meclizine (ANTIVERT) 25 MG tablet Take 1 tablet (25 mg) by mouth 3 times daily as needed for dizziness. 21 tablet 0    multivitamin (CENTRUM SILVER) tablet Take 1 tablet by mouth daily      ondansetron (ZOFRAN ODT) 4 MG ODT tab Take 1-2 tablets (4-8 mg) by mouth every 8 hours as needed for  "nausea. 20 tablet 0    fluticasone (FLONASE) 50 MCG/ACT nasal spray Spray 1 spray into both nostrils daily. (Patient not taking: Reported on 2/4/2025) 18.2 g 1     No current facility-administered medications for this visit.            Review of Systems   Constitutional, HEENT, cardiovascular, pulmonary, GI, , musculoskeletal, neuro, skin, endocrine and psych systems are negative, except as otherwise noted.      Wt Readings from Last 3 Encounters:   02/04/25 113.4 kg (250 lb)   12/02/24 109.8 kg (242 lb)   11/07/24 110 kg (242 lb 9.6 oz)      Objective      /74   Pulse 82   Temp 97.5  F (36.4  C)   Resp 18   Ht 1.626 m (5' 4\")   Wt 113.4 kg (250 lb)   LMP  (LMP Unknown)   SpO2 99%   BMI 42.91 kg/m     Physical Exam   GENERAL: alert and no distress  EYES: Eyes grossly normal to inspection, PERRL and conjunctivae and sclerae normal  HENT: ear canals and TM's normal, nose and mouth without ulcers or lesions  NECK: no adenopathy, no asymmetry, masses, or scars  RESP: lungs clear to auscultation - no rales, rhonchi or wheezes  CV: regular rate and rhythm, normal S1 S2, no S3 or S4, no murmur, click or rub, no peripheral edema  MS: no gross musculoskeletal defects noted, no edema    Admission on 12/02/2024, Discharged on 12/02/2024   Component Date Value Ref Range Status    Sodium 12/02/2024 140  135 - 145 mmol/L Final    Potassium 12/02/2024 3.6  3.4 - 5.3 mmol/L Final    Chloride 12/02/2024 106  98 - 107 mmol/L Final    Carbon Dioxide (CO2) 12/02/2024 25  22 - 29 mmol/L Final    Anion Gap 12/02/2024 9  7 - 15 mmol/L Final    Urea Nitrogen 12/02/2024 7.6 (L)  8.0 - 23.0 mg/dL Final    Creatinine 12/02/2024 0.74  0.51 - 0.95 mg/dL Final    GFR Estimate 12/02/2024 90  >60 mL/min/1.73m2 Final    eGFR calculated using 2021 CKD-EPI equation.    Calcium 12/02/2024 9.0  8.8 - 10.4 mg/dL Final    Reference intervals for this test were updated on 7/16/2024 to reflect our healthy population more accurately. There " may be differences in the flagging of prior results with similar values performed with this method. Those prior results can be interpreted in the context of the updated reference intervals.    Glucose 12/02/2024 107 (H)  70 - 99 mg/dL Final    Troponin T, High Sensitivity 12/02/2024 13  <=14 ng/L Final    Either a High Sensitivity Troponin T baseline (0 hours) value = 100 ng/L, or an increase in High Sensitivity Troponin T = 7 ng/L at 2 hours compared to 0 hours (2-0 hours), suggests myocardial injury, and urgent clinical attention is required.    If the 2-0 hours increase is <7 ng/L, a High Sensitivity Troponin T result above gender-specific reference ranges warrants further evaluation.   Recommendations for further evaluation include correlation with clinical decision-making tool (e.g., HEART), a 3rd High Sensitivity Troponin T test 2 hours after the 2nd (a 20% change from baseline would represent concern), admission for observation, close PCC/cardiology follow-up, or urgent outpatient provocative testing.    Magnesium 12/02/2024 2.1  1.7 - 2.3 mg/dL Final    Color Urine 12/02/2024 Light Yellow  Colorless, Straw, Light Yellow, Yellow Final    Appearance Urine 12/02/2024 Clear  Clear Final    Glucose Urine 12/02/2024 Negative  Negative mg/dL Final    Bilirubin Urine 12/02/2024 Negative  Negative Final    Ketones Urine 12/02/2024 Negative  Negative mg/dL Final    Specific Gravity Urine 12/02/2024 1.012  1.001 - 1.030 Final    Blood Urine 12/02/2024 Negative  Negative Final    pH Urine 12/02/2024 6.5  5.0 - 7.0 Final    Protein Albumin Urine 12/02/2024 Negative  Negative mg/dL Final    Urobilinogen Urine 12/02/2024 <2.0  <2.0 mg/dL Final    Nitrite Urine 12/02/2024 Negative  Negative Final    Leukocyte Esterase Urine 12/02/2024 Negative  Negative Final    Mucus Urine 12/02/2024 Present (A)  None Seen /LPF Final    RBC Urine 12/02/2024 <1  <=2 /HPF Final    WBC Urine 12/02/2024 1  <=5 /HPF Final    Squamous  Epithelials Urine 12/02/2024 <1  <=1 /HPF Final    Ventricular Rate 12/02/2024 62  BPM Final    Atrial Rate 12/02/2024 62  BPM Final    AR Interval 12/02/2024 180  ms Final    QRS Duration 12/02/2024 82  ms Final    QT 12/02/2024 436  ms Final    QTc 12/02/2024 442  ms Final    P Axis 12/02/2024 57  degrees Final    R AXIS 12/02/2024 -2  degrees Final    T Axis 12/02/2024 50  degrees Final    Interpretation ECG 12/02/2024    Final                    Value:Sinus rhythm  Normal ECG  When compared with ECG of 22-Dec-2009 08:44,  No significant change was found  Confirmed by SEE ED PROVIDER NOTE FOR, ECG INTERPRETATION ([id number]),  Jumana Moore ([id number]) on 12/2/2024 10:35:13 AM      WBC Count 12/02/2024 6.0  4.0 - 11.0 10e3/uL Final    RBC Count 12/02/2024 4.39  3.80 - 5.20 10e6/uL Final    Hemoglobin 12/02/2024 11.6 (L)  11.7 - 15.7 g/dL Final    Hematocrit 12/02/2024 36.8  35.0 - 47.0 % Final    MCV 12/02/2024 84  78 - 100 fL Final    MCH 12/02/2024 26.4 (L)  26.5 - 33.0 pg Final    MCHC 12/02/2024 31.5  31.5 - 36.5 g/dL Final    RDW 12/02/2024 14.9  10.0 - 15.0 % Final    Platelet Count 12/02/2024 220  150 - 450 10e3/uL Final    % Neutrophils 12/02/2024 56  % Final    % Lymphocytes 12/02/2024 35  % Final    % Monocytes 12/02/2024 7  % Final    % Eosinophils 12/02/2024 1  % Final    % Basophils 12/02/2024 1  % Final    % Immature Granulocytes 12/02/2024 0  % Final    NRBCs per 100 WBC 12/02/2024 0  <1 /100 Final    Absolute Neutrophils 12/02/2024 3.4  1.6 - 8.3 10e3/uL Final    Absolute Lymphocytes 12/02/2024 2.1  0.8 - 5.3 10e3/uL Final    Absolute Monocytes 12/02/2024 0.4  0.0 - 1.3 10e3/uL Final    Absolute Eosinophils 12/02/2024 0.1  0.0 - 0.7 10e3/uL Final    Absolute Basophils 12/02/2024 0.1  0.0 - 0.2 10e3/uL Final    Absolute Immature Granulocytes 12/02/2024 0.0  <=0.4 10e3/uL Final    Absolute NRBCs 12/02/2024 0.0  10e3/uL Final    Troponin T, High Sensitivity 12/02/2024 <6  <=14 ng/L Final    Either a High  Sensitivity Troponin T baseline (0 hours) value = 100 ng/L, or an increase in High Sensitivity Troponin T = 7 ng/L at 2 hours compared to 0 hours (2-0 hours), suggests myocardial injury, and urgent clinical attention is required.    If the 2-0 hours increase is <7 ng/L, a High Sensitivity Troponin T result above gender-specific reference ranges warrants further evaluation.   Recommendations for further evaluation include correlation with clinical decision-making tool (e.g., HEART), a 3rd High Sensitivity Troponin T test 2 hours after the 2nd (a 20% change from baseline would represent concern), admission for observation, close PCC/cardiology follow-up, or urgent outpatient provocative testing.       Camille Chopra MD 2/4/2025    [facility].  [phone]

## 2025-03-14 ENCOUNTER — THERAPY VISIT (OUTPATIENT)
Dept: PHYSICAL THERAPY | Facility: REHABILITATION | Age: 65
End: 2025-03-14
Payer: COMMERCIAL

## 2025-03-14 DIAGNOSIS — H81.13 BENIGN PAROXYSMAL POSITIONAL VERTIGO DUE TO BILATERAL VESTIBULAR DISORDER: Primary | ICD-10-CM

## 2025-03-14 PROCEDURE — 97112 NEUROMUSCULAR REEDUCATION: CPT | Mod: GP

## 2025-03-28 ENCOUNTER — THERAPY VISIT (OUTPATIENT)
Dept: PHYSICAL THERAPY | Facility: REHABILITATION | Age: 65
End: 2025-03-28
Payer: COMMERCIAL

## 2025-03-28 DIAGNOSIS — H81.13 BENIGN PAROXYSMAL POSITIONAL VERTIGO DUE TO BILATERAL VESTIBULAR DISORDER: Primary | ICD-10-CM

## 2025-03-28 PROCEDURE — 97112 NEUROMUSCULAR REEDUCATION: CPT | Mod: GP

## 2025-07-17 DIAGNOSIS — K75.4 AUTOIMMUNE HEPATITIS (H): Primary | ICD-10-CM

## 2025-07-31 ENCOUNTER — OFFICE VISIT (OUTPATIENT)
Dept: GASTROENTEROLOGY | Facility: CLINIC | Age: 65
End: 2025-07-31
Payer: COMMERCIAL

## 2025-07-31 ENCOUNTER — LAB (OUTPATIENT)
Dept: LAB | Facility: CLINIC | Age: 65
End: 2025-07-31
Attending: PHYSICIAN ASSISTANT
Payer: COMMERCIAL

## 2025-07-31 VITALS
DIASTOLIC BLOOD PRESSURE: 82 MMHG | SYSTOLIC BLOOD PRESSURE: 138 MMHG | WEIGHT: 250 LBS | BODY MASS INDEX: 42.91 KG/M2 | OXYGEN SATURATION: 98 % | HEART RATE: 83 BPM

## 2025-07-31 DIAGNOSIS — E66.813 CLASS 3 OBESITY (H): ICD-10-CM

## 2025-07-31 DIAGNOSIS — K75.4 AUTOIMMUNE HEPATITIS (H): ICD-10-CM

## 2025-07-31 DIAGNOSIS — D47.2 MGUS (MONOCLONAL GAMMOPATHY OF UNKNOWN SIGNIFICANCE): ICD-10-CM

## 2025-07-31 DIAGNOSIS — K75.4 AUTOIMMUNE HEPATITIS (H): Primary | ICD-10-CM

## 2025-07-31 DIAGNOSIS — R03.0 ELEVATED BLOOD PRESSURE READING: ICD-10-CM

## 2025-07-31 LAB
ALBUMIN SERPL BCG-MCNC: 4 G/DL (ref 3.5–5.2)
ALP SERPL-CCNC: 71 U/L (ref 40–150)
ALT SERPL W P-5'-P-CCNC: 7 U/L (ref 0–50)
ANION GAP SERPL CALCULATED.3IONS-SCNC: 11 MMOL/L (ref 7–15)
AST SERPL W P-5'-P-CCNC: 17 U/L (ref 0–45)
BILIRUB SERPL-MCNC: 0.5 MG/DL
BILIRUBIN DIRECT (ROCHE PRO & PURE): 0.19 MG/DL (ref 0–0.45)
BUN SERPL-MCNC: 9 MG/DL (ref 8–23)
CALCIUM SERPL-MCNC: 9.6 MG/DL (ref 8.8–10.4)
CHLORIDE SERPL-SCNC: 106 MMOL/L (ref 98–107)
CREAT SERPL-MCNC: 0.71 MG/DL (ref 0.51–0.95)
EGFRCR SERPLBLD CKD-EPI 2021: >90 ML/MIN/1.73M2
ERYTHROCYTE [DISTWIDTH] IN BLOOD BY AUTOMATED COUNT: 14.9 % (ref 10–15)
GLUCOSE SERPL-MCNC: 127 MG/DL (ref 70–99)
HCO3 SERPL-SCNC: 24 MMOL/L (ref 22–29)
HCT VFR BLD AUTO: 36.9 % (ref 35–47)
HGB BLD-MCNC: 11.4 G/DL (ref 11.7–15.7)
INR PPP: 1.08 (ref 0.85–1.15)
MCH RBC QN AUTO: 26.4 PG (ref 26.5–33)
MCHC RBC AUTO-ENTMCNC: 30.9 G/DL (ref 31.5–36.5)
MCV RBC AUTO: 85 FL (ref 78–100)
PLATELET # BLD AUTO: 208 10E3/UL (ref 150–450)
POTASSIUM SERPL-SCNC: 3.8 MMOL/L (ref 3.4–5.3)
PROT SERPL-MCNC: 7.6 G/DL (ref 6.4–8.3)
PROTHROMBIN TIME: 14.4 SECONDS (ref 11.8–14.8)
RBC # BLD AUTO: 4.32 10E6/UL (ref 3.8–5.2)
SODIUM SERPL-SCNC: 141 MMOL/L (ref 135–145)
WBC # BLD AUTO: 6.7 10E3/UL (ref 4–11)

## 2025-07-31 PROCEDURE — 99214 OFFICE O/P EST MOD 30 MIN: CPT | Performed by: PHYSICIAN ASSISTANT

## 2025-07-31 PROCEDURE — 3079F DIAST BP 80-89 MM HG: CPT | Performed by: PHYSICIAN ASSISTANT

## 2025-07-31 PROCEDURE — 3075F SYST BP GE 130 - 139MM HG: CPT | Performed by: PHYSICIAN ASSISTANT

## 2025-07-31 PROCEDURE — 1126F AMNT PAIN NOTED NONE PRSNT: CPT | Performed by: PHYSICIAN ASSISTANT

## 2025-07-31 ASSESSMENT — PAIN SCALES - GENERAL: PAINLEVEL_OUTOF10: NO PAIN (0)

## (undated) DEVICE — KIT ENDO FIRST STEP DISINFECTANT 200ML W/POUCH EP-4

## (undated) DEVICE — SUCTION MANIFOLD NEPTUNE 2 SYS 1 PORT 702-025-000

## (undated) DEVICE — ENDO SNARE EXACTO COLD 9MM LOOP 2.4MMX230CM 00711115

## (undated) DEVICE — GOWN IMPERVIOUS 2XL BLUE

## (undated) DEVICE — KIT ENDO TURNOVER/PROCEDURE CARRY-ON 101822

## (undated) DEVICE — SPECIMEN CONTAINER 3OZ W/FORMALIN 59901

## (undated) DEVICE — ENDO TRAP POLYP E-TRAP 00711099

## (undated) DEVICE — SOL WATER IRRIG 500ML BOTTLE 2F7113

## (undated) DEVICE — TUBING SUCTION MEDI-VAC 1/4"X20' N620A

## (undated) RX ORDER — FENTANYL CITRATE 50 UG/ML
INJECTION, SOLUTION INTRAMUSCULAR; INTRAVENOUS
Status: DISPENSED
Start: 2024-10-02